# Patient Record
Sex: FEMALE | Race: BLACK OR AFRICAN AMERICAN | Employment: OTHER | URBAN - METROPOLITAN AREA
[De-identification: names, ages, dates, MRNs, and addresses within clinical notes are randomized per-mention and may not be internally consistent; named-entity substitution may affect disease eponyms.]

---

## 2018-09-06 ENCOUNTER — CLINICAL SUPPORT (OUTPATIENT)
Dept: FAMILY MEDICINE CLINIC | Age: 73
End: 2018-09-06

## 2018-09-06 DIAGNOSIS — E66.9 OBESITY, UNSPECIFIED CLASSIFICATION, UNSPECIFIED OBESITY TYPE, UNSPECIFIED WHETHER SERIOUS COMORBIDITY PRESENT: Primary | ICD-10-CM

## 2018-09-06 NOTE — PROGRESS NOTES
Patient attended a Medically Supervised Weight Loss New Patient Orientation today where we discussed:  - New Direction Very Low Calorie Diet details  - Medical Supervision  - Nutrition education  - Cost of Meal Replacements  - Policies and compliance required for program enrollment.      Patients initial consultation with physician is tentatively scheduled for:  Future Appointments  Date Time Provider Shubham Wayne   10/12/2018 9:00 AM Nancy Bowden NP [address] [facility]

## 2018-09-10 DIAGNOSIS — Z01.812 BLOOD TESTS PRIOR TO TREATMENT OR PROCEDURE: Primary | ICD-10-CM

## 2018-09-26 ENCOUNTER — HOSPITAL ENCOUNTER (OUTPATIENT)
Dept: LAB | Age: 73
Discharge: HOME | End: 2018-09-26

## 2018-09-26 LAB — XX-LABCORP SPECIMEN COL,LCBCF: NORMAL

## 2018-09-26 PROCEDURE — 99001 SPECIMEN HANDLING PT-LAB: CPT | Performed by: NURSE PRACTITIONER

## 2018-09-27 LAB
ALBUMIN SERPL-MCNC: 4.2 G/DL (ref 3.5–4.8)
ALBUMIN/GLOB SERPL: 1.4 {RATIO} (ref 1.2–2.2)
ALP SERPL-CCNC: 73 IU/L (ref 39–117)
ALT SERPL-CCNC: 17 IU/L (ref 0–32)
APPEARANCE UR: CLEAR
AST SERPL-CCNC: 21 IU/L (ref 0–40)
BACTERIA #/AREA URNS HPF: ABNORMAL /[HPF]
BASOPHILS # BLD AUTO: 0 X10E3/UL (ref 0–0.2)
BASOPHILS NFR BLD AUTO: 1 %
BILIRUB SERPL-MCNC: 0.3 MG/DL (ref 0–1.2)
BILIRUB UR QL STRIP: NEGATIVE
BUN SERPL-MCNC: 23 MG/DL (ref 8–27)
BUN/CREAT SERPL: 22 (ref 12–28)
CALCIUM SERPL-MCNC: 9.8 MG/DL (ref 8.7–10.3)
CASTS URNS QL MICRO: ABNORMAL /LPF
CHLORIDE SERPL-SCNC: 101 MMOL/L (ref 96–106)
CHOLEST SERPL-MCNC: 176 MG/DL (ref 100–199)
CO2 SERPL-SCNC: 26 MMOL/L (ref 20–29)
COLOR UR: YELLOW
CREAT SERPL-MCNC: 1.06 MG/DL (ref 0.57–1)
EOSINOPHIL # BLD AUTO: 0.1 X10E3/UL (ref 0–0.4)
EOSINOPHIL NFR BLD AUTO: 2 %
EPI CELLS #/AREA URNS HPF: ABNORMAL /HPF
ERYTHROCYTE [DISTWIDTH] IN BLOOD BY AUTOMATED COUNT: 14.7 % (ref 12.3–15.4)
GLOBULIN SER CALC-MCNC: 3.1 G/DL (ref 1.5–4.5)
GLUCOSE SERPL-MCNC: 97 MG/DL (ref 65–99)
GLUCOSE UR QL: NEGATIVE
HCT VFR BLD AUTO: 38.3 % (ref 34–46.6)
HDLC SERPL-MCNC: 61 MG/DL
HGB BLD-MCNC: 12 G/DL (ref 11.1–15.9)
HGB UR QL STRIP: NEGATIVE
IMM GRANULOCYTES # BLD: 0 X10E3/UL (ref 0–0.1)
IMM GRANULOCYTES NFR BLD: 0 %
KETONES UR QL STRIP: NEGATIVE
LDLC SERPL CALC-MCNC: 98 MG/DL (ref 0–99)
LEUKOCYTE ESTERASE UR QL STRIP: ABNORMAL
LYMPHOCYTES # BLD AUTO: 3.8 X10E3/UL (ref 0.7–3.1)
LYMPHOCYTES NFR BLD AUTO: 51 %
MAGNESIUM SERPL-MCNC: 1.9 MG/DL (ref 1.6–2.3)
MCH RBC QN AUTO: 26.3 PG (ref 26.6–33)
MCHC RBC AUTO-ENTMCNC: 31.3 G/DL (ref 31.5–35.7)
MCV RBC AUTO: 84 FL (ref 79–97)
MICRO URNS: ABNORMAL
MONOCYTES # BLD AUTO: 0.4 X10E3/UL (ref 0.1–0.9)
MONOCYTES NFR BLD AUTO: 6 %
MUCOUS THREADS URNS QL MICRO: PRESENT
NEUTROPHILS # BLD AUTO: 2.9 X10E3/UL (ref 1.4–7)
NEUTROPHILS NFR BLD AUTO: 40 %
NITRITE UR QL STRIP: NEGATIVE
PH UR STRIP: 6 [PH] (ref 5–7.5)
PLATELET # BLD AUTO: 257 X10E3/UL (ref 150–379)
POTASSIUM SERPL-SCNC: 4.3 MMOL/L (ref 3.5–5.2)
PROT SERPL-MCNC: 7.3 G/DL (ref 6–8.5)
PROT UR QL STRIP: NEGATIVE
RBC # BLD AUTO: 4.57 X10E6/UL (ref 3.77–5.28)
RBC #/AREA URNS HPF: ABNORMAL /HPF
SODIUM SERPL-SCNC: 140 MMOL/L (ref 134–144)
SP GR UR: 1.01 (ref 1–1.03)
TRIGL SERPL-MCNC: 85 MG/DL (ref 0–149)
TSH SERPL DL<=0.005 MIU/L-ACNC: 1.18 UIU/ML (ref 0.45–4.5)
URATE SERPL-MCNC: 7.2 MG/DL (ref 2.5–7.1)
UROBILINOGEN UR STRIP-MCNC: 0.2 MG/DL (ref 0.2–1)
VLDLC SERPL CALC-MCNC: 17 MG/DL (ref 5–40)
WBC # BLD AUTO: 7.4 X10E3/UL (ref 3.4–10.8)
WBC #/AREA URNS HPF: ABNORMAL /HPF

## 2018-09-28 NOTE — PROGRESS NOTES
Ordered labs/urinalysis/EKG for pt prior to consult for starting a medically supervised weight loss program. Would recommend follow up with PCP for clearance prior to start of VLCD at this time. Notified pt and daughter, they requested please send copy of labs to PCP for review.

## 2018-10-04 ENCOUNTER — TELEPHONE (OUTPATIENT)
Dept: SURGERY | Age: 73
End: 2018-10-04

## 2018-10-04 ENCOUNTER — DOCUMENTATION ONLY (OUTPATIENT)
Dept: SURGERY | Age: 73
End: 2018-10-04

## 2018-10-04 NOTE — TELEPHONE ENCOUNTER
Returning pt call as requested per nurse message no answer and VM full. Will attempt to return call again at later time.

## 2018-10-09 ENCOUNTER — TELEPHONE (OUTPATIENT)
Dept: SURGERY | Age: 73
End: 2018-10-09

## 2018-11-14 ENCOUNTER — OFFICE VISIT (OUTPATIENT)
Dept: SURGERY | Age: 73
End: 2018-11-14

## 2018-11-14 VITALS
HEART RATE: 67 BPM | RESPIRATION RATE: 20 BRPM | DIASTOLIC BLOOD PRESSURE: 84 MMHG | TEMPERATURE: 96.4 F | OXYGEN SATURATION: 98 % | HEIGHT: 57 IN | SYSTOLIC BLOOD PRESSURE: 152 MMHG | BODY MASS INDEX: 49.36 KG/M2 | WEIGHT: 228.8 LBS

## 2018-11-14 DIAGNOSIS — E11.9 TYPE 2 DIABETES MELLITUS WITHOUT COMPLICATION, WITHOUT LONG-TERM CURRENT USE OF INSULIN (HCC): ICD-10-CM

## 2018-11-14 DIAGNOSIS — Z72.4 INAPPROPRIATE DIET AND EATING HABITS: ICD-10-CM

## 2018-11-14 DIAGNOSIS — E66.01 MORBID (SEVERE) OBESITY DUE TO EXCESS CALORIES (HCC): Primary | ICD-10-CM

## 2018-11-14 RX ORDER — MELATONIN
1000
COMMUNITY

## 2018-11-14 RX ORDER — LOSARTAN POTASSIUM AND HYDROCHLOROTHIAZIDE 12.5; 5 MG/1; MG/1
TABLET ORAL
COMMUNITY
Start: 2018-08-17 | End: 2019-01-25

## 2018-11-14 NOTE — PATIENT INSTRUCTIONS
If you have any questions or concerns about today's appointment, the verbal and/or written instructions you were given for follow up care, please call our office at [phone]. [facility] - Chuckie Gagnon [address] [place] [address]    [phone] office  [phone]    Monthly goal:      4 meal replacements daily, no other food. First one within about 1 hour of waking up to get metabolism started. Don't go more than 6 hours between meals. No more than 1 soup a day, this is too much salt. No more than 1 bar a day, this not enough protein. You are allotted 10 carbs extra a day. Recommendations       - Consume your 4 daily meal replacements equally spaced over the day. Dont go more than 6 hours between each meal. Breakfast is especially important!      - Get the support of family and friends. - Snack-proof your home. - Have strategies for social situations, meetings that run over or vacation.       - If you fall off the plan; just start right back. Reflect on those days into examples of what to change or avoid next time. Program Compliance      We do not expect perfection. However, we do ask for your persistence and your willingness to do the work of growing yourself. You will hit plateaus in your weight loss. You will run into situations that test your will power. You will encounter times when you feel frustrated. How your respond to your slip ups and, the adjustments you make to prevent future slip ups will determine you long-term success. If you find yourself temporarily slipping in the program we will gently nudge you forward and encourage you to do the work of identifying and move beyond your stuck areas. However, if you find yourself wavering in the program for a prolonged time (more than 3 months) we will ask that you take a break from the program. At that time you will have 3 options:       1.  Consult with one of our weight loss specialists to explore additional weight loss options. 2. Work with a counselor to do some focused work in order to identify and break the patterns that are holding you back. 3. A combination of both these. Once you, your counselor and/or your weight loss specialist feel that you have moved past those patterns and want to give the program another chance, we will gladly work with you to determine if you're ready to start back in the program.      When returning after a break, if it has been over 3 months you will required to repeat an orientation and, if greater than 6 months, you will be required to repeat an orientation, labs and an EKG. Homework for [employer]        Exercise:   - Daily starting slow, gradually increase your time by 10- 20 % per week     - To prevent injury, take a recovery week every 4 weeks (reduce your exercise time and intensity by 1/2 during this time)     - Your goal is to work up to 150 min a week; hard enough that you can't whistle or sing. It may take 6 months to work up to this. - Call the Health  at [facility] labs for exercise ideas ([phone])          Diet:                            Common Side Effects     -Constipation  -Fatigue  -Hunger  -Low sodium  -Hair Loss        1. Constipation: It can be prevented by Drinking at least 8-10 glasses of water a day, fiber, and exercise. If you're prone to constipation:  - Take a Stool Softener daily. Example: Dulcolax or Miralax   - Eat Plenty of Fiber                        Get the New Direction products with fiber added                        Keep your fiber intake to at least 20 grams a day                        Use SUGAR-FREE products: Fiber One, Benefiber or Metamucil     If you get constipated:  1.  Start with a Stool Softener (produces a bowel movement in 72 hr)              2.  If you still have constipation after 2 to 3 days, add a Stimulant Laxative to the Stool Softener (results usually in 6-12 hr):  Examples:  Exlax (1 small square) for up to 4 days, Dulcolax stimulant laxative, or Magnesium citrate pill 500 mg start once a day and increase to 3 times a day if needed. 3. If conditions or symptoms persist contact your provider. 2.  Fatigue, most people experience this:  More common during the first 2 weeks, associated with your body adjusting to the Ketogenic diet. If symptoms persist contact your provider. 3.  Hunger:  More common in the first few days often disappears after body adjusts to the Ketogenic diet. 4.  Low blood levels of sodium, less common:   If you develop a headache, feel fatigued, light-headed or dizziness try adding 1/2 cup of bouillon broth every day. If symptoms persist contact your provider. 5.  Hair loss, you may see more than a usual amount of hair in your brush or the shower drain:   This can happen with physical stress (surgery, trauma or calorie restriction) or psychological stress. Although is it very concerning, it is often temporary and will resolve within 3 months. Some people notice a benefit from taking a daily dose of Biotin 2,500 mcg and increasing their Fish Oil intake. If symptoms persist contact your provider. For further information on where carbs hide in our foods:  1. Our Registered Dietitians     2. Www. Atkins. com/tools     3. Read food labels     4. Books       - The Calorie Cathye Krystyna       - The [place] System Diet for a New You       - Floresita Green's NEW Carb and Calorie 4th Edition (my favorite)     5.  Smart phone and Internet-based apps       - NPM        - Carb Manager     Helpful Information on behavior change:   Change Anything by Blondell Loss, Jorge Ross, and Alok Mancia     Mindless Eating by Dakota Juarez     Perfectly Yourself by Thu Lack     Me, Myself and I - 28 Days to Self-Love by Galen Delgado       Long-term Healthy Weight / Body Fat  Different ways to determining your ideal weight:  1. Keep your waist to less than 1/2 of your height   2. Keep your % body fat to under 30% for female and under 20% if male  3.  Keep your BMI around 25      Fun Facts About Carbs     - The Typical American Diet = 450 grams a day     - The Reducing Phase of the VLCD = 40 grams a day     - Target for weight loss maintenance:                        - Eat no more than 30 grams per meal                        - Eat no more than 10 grams per snack (mid-morning and mid-afternoon snack)

## 2018-11-14 NOTE — PROGRESS NOTES
Initial Consultation for Weight Loss    Siri Jorge is a 71 y.o. female who comes into the office today for initial consultation for the options for the treatment of obesity. The patient initially identified obesity at the age of \"always bigger\", worse after 30's, worse again after she stopped working and at age 26 weighed 130lbs. She has tried a variety of unsupervised weight-loss attempts including self imposed and physical activity , but has yet to meet with lasting success. Maximum weight lost on a diet is about 13 lbs, but that the weight loss always seems to return. Today, the patient is  Height: 4' 9\" (144.8 cm) tall, Weight: 103.8 kg (228 lb 12.8 oz) lbs for a Body mass index is 49.51 kg/m². It is due to the patient's obesity, which is further complicated by diabetes, hypertension and FABIO  that the patient is now seeking out medically supervised VLCD. Would like to lose wt for joint replacement. Goal: 190lbs          Ideal body weight: 45.5 kg (100 lb 5.7 oz)  Adjusted ideal body weight: 68.8 kg (151 lb 11.7 oz) (Based on [employer] Tables)      Wt Readings from Last 10 Encounters:   11/14/18 103.8 kg (228 lb 12.8 oz)   06/20/17 101.6 kg (224 lb)   01/20/17 102.1 kg (225 lb)   01/20/17 102.2 kg (225 lb 3.2 oz)   08/22/16 100.5 kg (221 lb 8 oz)       Weight Metrics 11/14/2018 6/20/2017 1/20/2017 1/20/2017 8/22/2016   Weight 228 lb 12.8 oz 224 lb 225 lb 225 lb 3.2 oz 221 lb 8 oz   BMI 49.51 kg/m2 48.47 kg/m2 48.69 kg/m2 48.73 kg/m2 47.93 kg/m2     History of binge eating: no    History of purging: no    Major lifestyle changes: no   Other commitments: no   Any potential unsupportive: yes     Has Walker Spears ever been told by a physician not to exercise: no    Does Walker Spears know of any reason they shouldn't exercise: yes  If yes, why? Needs double knee replacement      Does Maris have any food allergies or sensitivities: no      MWL questionnaire reviewed.        If female:  No LMP recorded. Patient has had a hysterectomy. Past Medical History:   Diagnosis Date    Arthritis     Diabetes ([facility] [address] 08/2016    Hypertension     Morbid obesity ([facility]     Sleep apnea     CPAP machine       Past Surgical History:   Procedure Laterality Date    HX HYSTERECTOMY      HX ORTHOPAEDIC Right     shoulder surgery    HX OTHER SURGICAL      Nephrectomy       Current Outpatient Medications   Medication Sig Dispense Refill    losartan-hydroCHLOROthiazide (HYZAAR) 50-12.5 mg per tablet TAKE 1 TABLET BY MOUTH ONCE DAILY      calcium carbonate (CALCIUM 500 PO) Take 500 mg by mouth.  acetaminophen (TYLENOL) 325 mg tablet Take  by mouth every four (4) hours as needed for Pain. Indications: BACK PAIN, PAIN      multivitamin (ONE A DAY) tablet Take 1 Tab by mouth daily.  calcium-cholecalciferol, d3, (CALCIUM 600 + D) 600-125 mg-unit tab Take  by mouth daily.  losartan (COZAAR) 25 mg tablet Take 25 mg by mouth daily.  diclofenac (VOLTAREN) 1 % gel Apply 2 g to affected area as needed.  Indications: OSTEOARTHRITIS         No Known Allergies    Social History     Tobacco Use    Smoking status: Never Smoker    Smokeless tobacco: Never Used   Substance Use Topics    Alcohol use: No    Drug use: No       Family History   Problem Relation Age of Onset    Diabetes Mother     Diabetes Sister        Family Status   Relation Name Status    Mother  (Not Specified)    Sister  (Not Specified)       Review of Systems:   Positive in BOLD  CONST: Fever, weight loss, fatigue or chills  GI: Nausea, vomiting, abdominal pain, change in bowel habits, hematochezia, melena, and GERD   INTEG: Dermatitis, abnormal moles  HEENT: Recent changes in vision, vertigo, epistaxis, dysphagia and hoarseness  CV: Chest pain, palpitations, HTN, edema and varicosities  RESP: Cough, shortness of breath, wheezing, hemoptysis, snoring and reactive airway disease  : Hematuria, dysuria, frequency, urgency, nocturia and stress urinary incontinence   MS: Weakness, joint pain- knee  and arthritis  ENDO: Diabetes- diet mary checks BS, thyroid disease, polyuria, polydipsia, polyphagia, poor wound healing, heat intolerance, cold intolerance  LYMPH/HEME: Anemia, bruising and history of blood transfusions  NEURO: Dizziness, headache, fainting, seizures and stroke  PSYCH: Anxiety and depression      Physical Exam    Visit Vitals  Resp 20   Ht 4' 9\" (1.448 m)   Wt 103.8 kg (228 lb 12.8 oz)   BMI 49.51 kg/m²           Physical Exam   Constitutional: She is oriented to person, place, and time and well-developed, well-nourished, and in no distress. HENT:   Head: Normocephalic. Cardiovascular: Normal rate and regular rhythm. Pulmonary/Chest: Effort normal and breath sounds normal.   Abdominal: Soft. She exhibits no distension. There is no tenderness. Musculoskeletal: Normal range of motion. Neurological: She is alert and oriented to person, place, and time. Skin: Skin is warm and dry. Psychiatric: Affect normal.   Nursing note and vitals reviewed. Lab results reviewed. For significant abnormal values and values requiring intervention, see assessment and plan. Assessment/Plan  Elena Gay is a 71 y.o. female who is suffering from obesity with a BMI of 49.51  and comorbidities including diabetes and hypertension  who would benefit from weight loss. Diet regimen   # of meal replacements prescribed: 4 MR   If modified LCD-nutritional guidelines:    Monthly Goal   10lbs    Medical monitoring schedule:   Weekly BP/Weight checks   Monthly provider appointments              Monthly CMP, uric acid checks      I have reviewed/discussed the above normal BMI with the patient and daughter. I have recommended the following interventions: dietary management education, guidance, and counseling, monitor weight and prescribed dietary intake . .      Ms. Walls has a reminder for a \"due or due soon\" health maintenance.  I have asked that she contact her primary care provider for follow-up on this health maintenance.     Lilian Andrade, FNP-BC

## 2018-11-28 ENCOUNTER — CLINICAL SUPPORT (OUTPATIENT)
Dept: FAMILY MEDICINE CLINIC | Age: 73
End: 2018-11-28

## 2018-11-28 VITALS
HEART RATE: 82 BPM | SYSTOLIC BLOOD PRESSURE: 137 MMHG | BODY MASS INDEX: 47.68 KG/M2 | DIASTOLIC BLOOD PRESSURE: 82 MMHG | HEIGHT: 57 IN | WEIGHT: 221 LBS

## 2018-11-28 DIAGNOSIS — E66.9 OBESITY, UNSPECIFIED CLASSIFICATION, UNSPECIFIED OBESITY TYPE, UNSPECIFIED WHETHER SERIOUS COMORBIDITY PRESENT: Primary | ICD-10-CM

## 2018-11-28 NOTE — PROGRESS NOTES
Progress Note: Weekly Medical Monitoring in the [facility] Weight Loss Program    Is there anything that you or the patient needs to let the supervising provider know about? no    Over the past week, have you experienced any side-effects? Yes constipation and dry mouth    Jose David Higginbtoham is a 71 y.o. female who is enrolled in [facility]    Jose David Higginbotham was prescribed the VLCD / LCD. Visit Vitals  /82   Pulse 82   Ht 4' 9\" (1.448 m)   Wt 100.2 kg (221 lb)   BMI 47.82 kg/m²     Weight Metrics 11/28/2018 11/14/2018 11/14/2018 6/20/2017 1/20/2017 1/20/2017 8/22/2016   Weight 221 lb - 228 lb 12.8 oz 224 lb 225 lb 225 lb 3.2 oz 221 lb 8 oz   Waist Measure Inches 41 44.5 - - - - -   BMI 47.82 kg/m2 - 49.51 kg/m2 48.47 kg/m2 48.69 kg/m2 48.73 kg/m2 47.93 kg/m2         Have you received any other medical care this week? no  If yes, where and for what? Have you had any change in your medications since your last visit? no  If yes what? Did you have any problems adhering to the program last week? no  If yes, please explain:       Eating Habits Over Last Week:  Did you take in 64 oz of non-caloric fluids? no     Did you consume your prescribed meal replacement regimen each day?  yes       Physical Activity Over the Past Week:    Aerobic exercise: 0 min  Resistance exercise: 3 workouts / week

## 2018-12-07 ENCOUNTER — CLINICAL SUPPORT (OUTPATIENT)
Dept: FAMILY MEDICINE CLINIC | Age: 73
End: 2018-12-07

## 2018-12-07 VITALS
WEIGHT: 218 LBS | HEIGHT: 57 IN | SYSTOLIC BLOOD PRESSURE: 116 MMHG | BODY MASS INDEX: 47.03 KG/M2 | HEART RATE: 82 BPM | DIASTOLIC BLOOD PRESSURE: 76 MMHG

## 2018-12-07 DIAGNOSIS — E66.9 OBESITY, UNSPECIFIED CLASSIFICATION, UNSPECIFIED OBESITY TYPE, UNSPECIFIED WHETHER SERIOUS COMORBIDITY PRESENT: Primary | ICD-10-CM

## 2018-12-07 NOTE — PROGRESS NOTES
Chief Complaint Patient presents with  Weight Management Progress Note: Weekly Medical Monitoring in the [facility] Weight Loss Program   
Is there anything that you or the patient needs to let the supervising provider know about? no 
 
Over the past week, have you experienced any side-effects? no 
 
Jaja Ramsey is a 71 y.o. female who is enrolled in [facility] Program 
 
Jaja Ramsey was prescribed the VLCD / LCD. Visit Vitals /76 Pulse 82 Ht 4' 9\" (1.448 m) Wt 218 lb (98.9 kg) BMI 47.17 kg/m² Weight Metrics 12/7/2018 11/28/2018 11/14/2018 11/14/2018 6/20/2017 1/20/2017 1/20/2017 Weight 218 lb 221 lb - 228 lb 12.8 oz 224 lb 225 lb 225 lb 3.2 oz Waist Measure Inches 43 41 44.5 - - - -  
BMI 47.17 kg/m2 47.82 kg/m2 - 49.51 kg/m2 48.47 kg/m2 48.69 kg/m2 48.73 kg/m2 Have you received any other medical care this week? no  If yes, where and for what? Have you had any change in your medications since your last visit? no  If yes what? Did you have any problems adhering to the program last week? no  If yes, please explain:  
 
 
Eating Habits Over Last Week: 
Did you take in 64 oz of non-caloric fluids? no  
 
Did you consume your prescribed meal replacement regimen each day? yes Physical Activity Over the Past Week: 
 
Aerobic exercise: 90 min Resistance exercise: no workouts / week

## 2018-12-12 ENCOUNTER — CLINICAL SUPPORT (OUTPATIENT)
Dept: FAMILY MEDICINE CLINIC | Age: 73
End: 2018-12-12

## 2018-12-12 DIAGNOSIS — E66.9 OBESITY, UNSPECIFIED CLASSIFICATION, UNSPECIFIED OBESITY TYPE, UNSPECIFIED WHETHER SERIOUS COMORBIDITY PRESENT: Primary | ICD-10-CM

## 2018-12-14 VITALS
DIASTOLIC BLOOD PRESSURE: 74 MMHG | WEIGHT: 215.2 LBS | SYSTOLIC BLOOD PRESSURE: 122 MMHG | HEART RATE: 92 BPM | BODY MASS INDEX: 46.57 KG/M2

## 2018-12-14 NOTE — PROGRESS NOTES
Progress Note: Weekly Medical Monitoring in the [facility] Weight Loss Program    Is there anything that you or the patient needs to let the supervising provider know about? no    Over the past week, have you experienced any side-effects? no    Becky Cuevas is a 71 y.o. female who is enrolled in [facility]    Becky Cuevas was prescribed the VLCD / LCD. Visit Vitals  /74   Pulse 92   Wt 97.6 kg (215 lb 3.2 oz)   BMI 46.57 kg/m²     Weight Metrics 12/14/2018 12/12/2018 12/7/2018 11/28/2018 11/14/2018 11/14/2018 6/20/2017   Weight - 215 lb 3.2 oz 218 lb 221 lb - 228 lb 12.8 oz 224 lb   Waist Measure Inches 43.5 - 43 41 44.5 - -   BMI - 46.57 kg/m2 47.17 kg/m2 47.82 kg/m2 - 49.51 kg/m2 48.47 kg/m2         Have you received any other medical care this week? no  If yes, where and for what? Have you had any change in your medications since your last visit? no  If yes what? Did you have any problems adhering to the program last week? no  If yes, please explain:       Eating Habits Over Last Week:  Did you take in 64 oz of non-caloric fluids? no     Did you consume your prescribed meal replacement regimen each day?  yes       Physical Activity Over the Past Week:    Aerobic exercise: 0 min  Resistance exercise: 0 workouts / week

## 2018-12-27 ENCOUNTER — HOSPITAL ENCOUNTER (OUTPATIENT)
Dept: LAB | Age: 73
Discharge: HOME | End: 2018-12-27
Payer: MEDICARE

## 2018-12-27 DIAGNOSIS — E11.9 TYPE 2 DIABETES MELLITUS WITHOUT COMPLICATION, WITHOUT LONG-TERM CURRENT USE OF INSULIN (HCC): ICD-10-CM

## 2018-12-27 DIAGNOSIS — E66.01 MORBID (SEVERE) OBESITY DUE TO EXCESS CALORIES (HCC): ICD-10-CM

## 2018-12-27 DIAGNOSIS — Z72.4 INAPPROPRIATE DIET AND EATING HABITS: ICD-10-CM

## 2018-12-27 LAB
ALBUMIN SERPL-MCNC: 3.6 G/DL (ref 3.4–5)
ALBUMIN/GLOB SERPL: 0.8 {RATIO} (ref 0.8–1.7)
ALP SERPL-CCNC: 76 U/L (ref 45–117)
ALT SERPL-CCNC: 29 U/L (ref 13–56)
ANION GAP SERPL CALC-SCNC: 5 MMOL/L (ref 3–18)
AST SERPL-CCNC: 23 U/L (ref 15–37)
BILIRUB SERPL-MCNC: 0.4 MG/DL (ref 0.2–1)
BUN SERPL-MCNC: 38 MG/DL (ref 7–18)
BUN/CREAT SERPL: 37 (ref 12–20)
CALCIUM SERPL-MCNC: 9.7 MG/DL (ref 8.5–10.1)
CHLORIDE SERPL-SCNC: 106 MMOL/L (ref 100–108)
CO2 SERPL-SCNC: 31 MMOL/L (ref 21–32)
CREAT SERPL-MCNC: 1.04 MG/DL (ref 0.6–1.3)
GLOBULIN SER CALC-MCNC: 4.3 G/DL (ref 2–4)
GLUCOSE SERPL-MCNC: 91 MG/DL (ref 74–99)
POTASSIUM SERPL-SCNC: 4.5 MMOL/L (ref 3.5–5.5)
PROT SERPL-MCNC: 7.9 G/DL (ref 6.4–8.2)
SODIUM SERPL-SCNC: 142 MMOL/L (ref 136–145)
URATE SERPL-MCNC: 5.6 MG/DL (ref 2.6–7.2)

## 2018-12-27 PROCEDURE — 84550 ASSAY OF BLOOD/URIC ACID: CPT

## 2018-12-27 PROCEDURE — 36415 COLL VENOUS BLD VENIPUNCTURE: CPT

## 2018-12-27 PROCEDURE — 80053 COMPREHEN METABOLIC PANEL: CPT

## 2018-12-28 ENCOUNTER — OFFICE VISIT (OUTPATIENT)
Dept: SURGERY | Age: 73
End: 2018-12-28

## 2018-12-28 VITALS
BODY MASS INDEX: 47.47 KG/M2 | WEIGHT: 211 LBS | HEIGHT: 56 IN | RESPIRATION RATE: 20 BRPM | TEMPERATURE: 98.3 F | DIASTOLIC BLOOD PRESSURE: 70 MMHG | SYSTOLIC BLOOD PRESSURE: 116 MMHG | HEART RATE: 68 BPM

## 2018-12-28 DIAGNOSIS — Z71.3 WEIGHT LOSS COUNSELING, ENCOUNTER FOR: ICD-10-CM

## 2018-12-28 DIAGNOSIS — R63.4 RAPID WEIGHT LOSS: ICD-10-CM

## 2018-12-28 DIAGNOSIS — E66.01 OBESITY, MORBID (HCC): Primary | ICD-10-CM

## 2018-12-28 NOTE — PROGRESS NOTES
Chief Complaint   Patient presents with    Weight Management   1. Have you been to the ER, urgent care clinic since your last visit? Hospitalized since your last visit? No    2. Have you seen or consulted any other health care providers outside of the [address] [address] since your last visit? Include any pap smears or colon screening. No                          Nursing Note for Medical Monitoring in the [facility] Weight Loss Program      Haroldo Thomas is a 68 y.o. female who is enrolled in [facility]    Haroldo Thomas was prescribed the VLCD / LCD. Did you have any problems adhering to the program last week? no  If yes, please explain:     Since your last visit, have you experienced any complications? no    Have you received any other medical care this week? no  If yes, where and for what? Have you had any change in your medications since your last visit? no  If yes what? Are you taking an appetite suppressant? no   If yes:  Do you need a refill? BP Readings from Last 3 Encounters:   12/28/18 116/70   12/14/18 122/74   12/07/18 116/76        Eating Habits Over Last Week:  Did you take in 64 oz of non-caloric fluids? no     Did you consume your prescribed meal replacement regimen each day?  yes       Physical Activity Over the Past Week:    Aerobic exercise: 0 min  Resistance exercise: 2 workouts / week stretching

## 2018-12-28 NOTE — PROGRESS NOTES
[facility] Weight Loss Program Progress Note:   F/up Provider Visit    CC: Weight Management    Tete Ríos is a 68 y.o. female who is here for her  f/up medical provider visit for the VLCD Program. she did attend class last week.     -17 since starting program     Weight History  Weight Metrics 2018   Weight - 211 lb - 215 lb 3.2 oz 218 lb 221 lb -   Waist Measure Inches 42.5 - 43.5 - 43 41 44.5   BMI - 47.31 kg/m2 - 46.57 kg/m2 47.17 kg/m2 47.82 kg/m2 -       Starting wt: 228 lbs  Goal wt: 190lbs     Ideal body weight: 43.9 kg (96 lb 13.9 oz)  Adjusted ideal body weight: 64.6 kg (142 lb 8.3 oz)  Body mass index is 47.31 kg/m². History    Past Medical History:   Diagnosis Date    Arthritis     Diabetes ([facility] [address] [facility] 2016    no meds    Hypertension     Morbid obesity ([facility] [address] [facility]     Sleep apnea     CPAP machine    Use of cane as ambulatory aid        Past Surgical History:   Procedure Laterality Date    HX COLONOSCOPY      HX GYN          HX HEENT      cataract right and left    HX HYSTERECTOMY      HX ORTHOPAEDIC Right     shoulder surgery    HX OTHER SURGICAL      Nephrectomy       Current Outpatient Medications   Medication Sig Dispense Refill    cholecalciferol (VITAMIN D3) 1,000 unit tablet 1,000 Units.  acetaminophen (TYLENOL) 325 mg tablet Take  by mouth every four (4) hours as needed for Pain. Indications: BACK PAIN, PAIN      calcium-cholecalciferol, d3, (CALCIUM 600 + D) 600-125 mg-unit tab Take  by mouth daily.  losartan (COZAAR) 25 mg tablet Take 25 mg by mouth daily.  diclofenac (VOLTAREN) 1 % gel Apply 2 g to affected area as needed. Indications: OSTEOARTHRITIS      losartan-hydroCHLOROthiazide (HYZAAR) 50-12.5 mg per tablet TAKE 1 TABLET BY MOUTH ONCE DAILY      calcium carbonate (CALCIUM 500 PO) Take 500 mg by mouth.  multivitamin (ONE A DAY) tablet Take 1 Tab by mouth daily. No Known Allergies    Social History     Tobacco Use    Smoking status: Never Smoker    Smokeless tobacco: Never Used   Substance Use Topics    Alcohol use: No    Drug use: No       Family History   Problem Relation Age of Onset    Diabetes Mother     Diabetes Sister        Family Status   Relation Name Status    Mother  (Not Specified)    Sister  (Not Specified)         Medications:  Outpatient Medications Marked as Taking for the 12/28/18 encounter (Office Visit) with Azalea Salazar NP   Medication Sig Dispense Refill    cholecalciferol (VITAMIN D3) 1,000 unit tablet 1,000 Units.  acetaminophen (TYLENOL) 325 mg tablet Take  by mouth every four (4) hours as needed for Pain. Indications: BACK PAIN, PAIN      calcium-cholecalciferol, d3, (CALCIUM 600 + D) 600-125 mg-unit tab Take  by mouth daily.  losartan (COZAAR) 25 mg tablet Take 25 mg by mouth daily.  diclofenac (VOLTAREN) 1 % gel Apply 2 g to affected area as needed. Indications: OSTEOARTHRITIS           Review of Systems  Review of Systems   Constitutional: Positive for malaise/fatigue and weight loss. Musculoskeletal: Positive for back pain and joint pain. All other systems reviewed and are negative. Objective  Visit Vitals  /70   Pulse 68   Temp 98.3 °F (36.8 °C)   Resp 20   Ht 4' 8\" (1.422 m)   Wt 95.7 kg (211 lb)   BMI 47.31 kg/m²     No LMP recorded. Patient has had a hysterectomy. Physical Exam  Physical Exam   Constitutional: She is oriented to person, place, and time. She appears well-developed and well-nourished. HENT:   Head: Normocephalic. Cardiovascular: Normal rate and regular rhythm. Pulmonary/Chest: Effort normal and breath sounds normal.   Musculoskeletal: Normal range of motion. Neurological: She is alert and oriented to person, place, and time. Skin: Skin is warm and dry. Psychiatric: She has a normal mood and affect. Nursing note and vitals reviewed.         Assessment / Plan    1. Weight management    Degree of control: doing great! Progress was reviewed with patient. Goal(s) for next appointment:   -10lbs       2.   Labs    Latest results reviewed with patient   Routine monitoring labs ordered    Follow up 1 mo    >50% of 30 min visit spent counseling     YUKO Olmstead-BC

## 2019-01-09 ENCOUNTER — CLINICAL SUPPORT (OUTPATIENT)
Dept: FAMILY MEDICINE CLINIC | Age: 74
End: 2019-01-09

## 2019-01-09 DIAGNOSIS — E66.9 OBESITY, UNSPECIFIED CLASSIFICATION, UNSPECIFIED OBESITY TYPE, UNSPECIFIED WHETHER SERIOUS COMORBIDITY PRESENT: Primary | ICD-10-CM

## 2019-01-10 VITALS
SYSTOLIC BLOOD PRESSURE: 120 MMHG | WEIGHT: 208.8 LBS | DIASTOLIC BLOOD PRESSURE: 76 MMHG | BODY MASS INDEX: 46.97 KG/M2 | HEART RATE: 76 BPM | HEIGHT: 56 IN

## 2019-01-10 NOTE — PROGRESS NOTES
Progress Note: Weekly Medical Monitoring in the [facility]    Is there anything that you or the patient needs to let the supervising provider know about? no    Over the past week, have you experienced any side-effects? no    Renay Bosworth is a 68 y.o. female who is enrolled in [facility]    Renay Bosworth was prescribed the VLCD / LCD. Visit Vitals  /76   Pulse 76   Ht 4' 8\" (1.422 m)   Wt 94.7 kg (208 lb 12.8 oz)   BMI 46.81 kg/m²     Weight Metrics 1/10/2019 1/9/2019 12/28/2018 12/28/2018 12/14/2018 12/12/2018 12/7/2018   Weight - 208 lb 12.8 oz - 211 lb - 215 lb 3.2 oz 218 lb   Waist Measure Inches 40.5 - 42.5 - 43.5 - 43   BMI - 46.81 kg/m2 - 47.31 kg/m2 - 46.57 kg/m2 47.17 kg/m2         Have you received any other medical care this week? no  If yes, where and for what? Have you had any change in your medications since your last visit? no  If yes what? Did you have any problems adhering to the program last week? no  If yes, please explain:       Eating Habits Over Last Week:  Did you take in 64 oz of non-caloric fluids? no     Did you consume your prescribed meal replacement regimen each day?  yes       Physical Activity Over the Past Week:    Aerobic exercise: 0 min  Resistance exercise: 3 workouts / week

## 2019-01-16 ENCOUNTER — CLINICAL SUPPORT (OUTPATIENT)
Dept: FAMILY MEDICINE CLINIC | Age: 74
End: 2019-01-16

## 2019-01-16 DIAGNOSIS — E66.9 OBESITY, UNSPECIFIED CLASSIFICATION, UNSPECIFIED OBESITY TYPE, UNSPECIFIED WHETHER SERIOUS COMORBIDITY PRESENT: Primary | ICD-10-CM

## 2019-01-17 VITALS
HEART RATE: 86 BPM | DIASTOLIC BLOOD PRESSURE: 80 MMHG | WEIGHT: 206.8 LBS | HEIGHT: 56 IN | SYSTOLIC BLOOD PRESSURE: 120 MMHG | BODY MASS INDEX: 46.52 KG/M2

## 2019-01-17 NOTE — PROGRESS NOTES
Progress Note: Weekly Medical Monitoring in the [facility]    Is there anything that you or the patient needs to let the supervising provider know about? no    Over the past week, have you experienced any side-effects? no    Jeremias Gordon is a 68 y.o. female who is enrolled in [facility]    Jeremias Gordon was prescribed the VLCD / LCD. Visit Vitals  /80   Pulse 86   Ht 4' 8\" (1.422 m)   Wt 93.8 kg (206 lb 12.8 oz)   BMI 46.36 kg/m²     Weight Metrics 1/17/2019 1/16/2019 1/10/2019 1/9/2019 12/28/2018 12/28/2018 12/14/2018   Weight - 206 lb 12.8 oz - 208 lb 12.8 oz - 211 lb -   Waist Measure Inches 48 - 40.5 - 42.5 - 43.5   BMI - 46.36 kg/m2 - 46.81 kg/m2 - 47.31 kg/m2 -         Have you received any other medical care this week? no  If yes, where and for what? Have you had any change in your medications since your last visit? no  If yes what? Did you have any problems adhering to the program last week? no  If yes, please explain:       Eating Habits Over Last Week:  Did you take in 64 oz of non-caloric fluids?  no     Did you consume your prescribed meal replacement regimen each day? no       Physical Activity Over the Past Week:    Aerobic exercise: 0 min  Resistance exercise: 0 workouts / week

## 2019-01-24 ENCOUNTER — HOSPITAL ENCOUNTER (OUTPATIENT)
Dept: LAB | Age: 74
Discharge: HOME | End: 2019-01-24

## 2019-01-24 LAB — XX-LABCORP SPECIMEN COL,LCBCF: NORMAL

## 2019-01-24 PROCEDURE — 99001 SPECIMEN HANDLING PT-LAB: CPT

## 2019-01-25 ENCOUNTER — OFFICE VISIT (OUTPATIENT)
Dept: SURGERY | Age: 74
End: 2019-01-25

## 2019-01-25 VITALS
TEMPERATURE: 98.1 F | DIASTOLIC BLOOD PRESSURE: 75 MMHG | SYSTOLIC BLOOD PRESSURE: 113 MMHG | BODY MASS INDEX: 46.38 KG/M2 | HEIGHT: 56 IN | OXYGEN SATURATION: 98 % | WEIGHT: 206.2 LBS | HEART RATE: 65 BPM | RESPIRATION RATE: 22 BRPM

## 2019-01-25 DIAGNOSIS — E66.01 MORBID OBESITY (HCC): ICD-10-CM

## 2019-01-25 DIAGNOSIS — E66.01 OBESITY, MORBID (HCC): Primary | ICD-10-CM

## 2019-01-25 LAB
ALBUMIN SERPL-MCNC: 4 G/DL (ref 3.5–4.8)
ALBUMIN/GLOB SERPL: 1.2 {RATIO} (ref 1.2–2.2)
ALP SERPL-CCNC: 69 IU/L (ref 39–117)
ALT SERPL-CCNC: 18 IU/L (ref 0–32)
AST SERPL-CCNC: 23 IU/L (ref 0–40)
BILIRUB SERPL-MCNC: 0.4 MG/DL (ref 0–1.2)
BUN SERPL-MCNC: 30 MG/DL (ref 8–27)
BUN/CREAT SERPL: 31 (ref 12–28)
CALCIUM SERPL-MCNC: 10 MG/DL (ref 8.7–10.3)
CHLORIDE SERPL-SCNC: 99 MMOL/L (ref 96–106)
CO2 SERPL-SCNC: 26 MMOL/L (ref 20–29)
CREAT SERPL-MCNC: 0.97 MG/DL (ref 0.57–1)
GLOBULIN SER CALC-MCNC: 3.3 G/DL (ref 1.5–4.5)
GLUCOSE SERPL-MCNC: 104 MG/DL (ref 65–99)
POTASSIUM SERPL-SCNC: 4.3 MMOL/L (ref 3.5–5.2)
PROT SERPL-MCNC: 7.3 G/DL (ref 6–8.5)
SODIUM SERPL-SCNC: 142 MMOL/L (ref 134–144)
URATE SERPL-MCNC: 5.3 MG/DL (ref 2.5–7.1)

## 2019-01-25 NOTE — PROGRESS NOTES
[facility] Progress Note:   F/up Provider Visit    CC: Weight Management      Hi Murguia is a 68 y.o. female who is here for her  f/up medical provider visit for the VLCD/LCD Program. she DID attend class last week. Admits to not drinking enough water. Some days she drinks 16 oz and others up to 60 oz. Weight History  Weight Metrics 2019 2019 2019 2019 1/10/2019 2019 2018   Weight - 206 lb 3.2 oz - 206 lb 12.8 oz - 208 lb 12.8 oz -   Waist Measure Inches 41.5 - 48 - 40.5 - 42.5   BMI - 46.23 kg/m2 - 46.36 kg/m2 - 46.81 kg/m2 -       Starting wt: 228  Goal wt: 190   EKG due: no  Ideal body weight: 43.9 kg (96 lb 13.9 oz)  Adjusted ideal body weight: 63.8 kg (140 lb 9.6 oz)  Body mass index is 46.23 kg/m². History    Past Medical History:   Diagnosis Date    Arthritis     Diabetes ([facility] [address] 2016    no meds    Hypertension     Morbid obesity ([facility] [address]     Sleep apnea     CPAP machine    Use of cane as ambulatory aid        Past Surgical History:   Procedure Laterality Date    HX COLONOSCOPY      HX GYN          HX HEENT      cataract right and left    HX HYSTERECTOMY      HX ORTHOPAEDIC Right     shoulder surgery    HX OTHER SURGICAL      Nephrectomy       Current Outpatient Medications   Medication Sig Dispense Refill    losartan-hydroCHLOROthiazide (HYZAAR) 50-12.5 mg per tablet TAKE 1 TABLET BY MOUTH ONCE DAILY      calcium carbonate (CALCIUM 500 PO) Take 500 mg by mouth.  cholecalciferol (VITAMIN D3) 1,000 unit tablet 1,000 Units.  acetaminophen (TYLENOL) 325 mg tablet Take  by mouth every four (4) hours as needed for Pain. Indications: BACK PAIN, PAIN      multivitamin (ONE A DAY) tablet Take 1 Tab by mouth daily.  calcium-cholecalciferol, d3, (CALCIUM 600 + D) 600-125 mg-unit tab Take  by mouth daily.  losartan (COZAAR) 25 mg tablet Take 25 mg by mouth daily.       diclofenac (VOLTAREN) 1 % gel Apply 2 g to affected area as needed. Indications: OSTEOARTHRITIS         No Known Allergies    Social History     Tobacco Use    Smoking status: Never Smoker    Smokeless tobacco: Never Used   Substance Use Topics    Alcohol use: No    Drug use: No       Family History   Problem Relation Age of Onset    Diabetes Mother     Diabetes Sister        Family Status   Relation Name Status    Mother  (Not Specified)    Sister  (Not Specified)         Medications Currently Taking  Outpatient Medications Marked as Taking for the 1/25/19 encounter (Office Visit) with Erik Shipley NP   Medication Sig Dispense Refill    losartan-hydroCHLOROthiazide (HYZAAR) 50-12.5 mg per tablet TAKE 1 TABLET BY MOUTH ONCE DAILY      calcium carbonate (CALCIUM 500 PO) Take 500 mg by mouth.  cholecalciferol (VITAMIN D3) 1,000 unit tablet 1,000 Units.  acetaminophen (TYLENOL) 325 mg tablet Take  by mouth every four (4) hours as needed for Pain. Indications: BACK PAIN, PAIN      multivitamin (ONE A DAY) tablet Take 1 Tab by mouth daily.  calcium-cholecalciferol, d3, (CALCIUM 600 + D) 600-125 mg-unit tab Take  by mouth daily.  losartan (COZAAR) 25 mg tablet Take 25 mg by mouth daily.  diclofenac (VOLTAREN) 1 % gel Apply 2 g to affected area as needed. Indications: OSTEOARTHRITIS         Review of Systems  Review of Systems   Constitutional: Positive for malaise/fatigue. HENT: Negative. Eyes: Negative. Respiratory: Negative. Cardiovascular: Negative. Gastrointestinal: Negative. Musculoskeletal: Negative. Skin: Negative. Neurological: Negative. Psychiatric/Behavioral: Negative. Objective  Visit Vitals  /75   Pulse 65   Temp 98.1 °F (36.7 °C) (Oral)   Resp 22   Ht 4' 8\" (1.422 m)   Wt 93.5 kg (206 lb 3.2 oz)   SpO2 98%   BMI 46.23 kg/m²     No LMP recorded. Patient has had a hysterectomy.       Physical Exam  Physical Exam   Constitutional: She is oriented to person, place, and time. She appears well-developed and well-nourished. Cardiovascular: Normal rate, regular rhythm, normal heart sounds and intact distal pulses. Pulmonary/Chest: Effort normal and breath sounds normal.   Abdominal: Soft. Bowel sounds are normal.   Neurological: She is alert and oriented to person, place, and time. Skin: Skin is warm. Psychiatric: She has a normal mood and affect. Her behavior is normal. Judgment and thought content normal.       Assessment / Plan    No diagnosis found. 1.  Weight management    well controlled   Progress was reviewed with patient   Goal(s) for next appointment:   8-10 pound loss       2.   Labs    Latest results reviewed with patient   Routine monitoring labs ordered        Follow-up Disposition: 1 month      >50% of 25 min visit spent counseling       BENJAMIN Palacios

## 2019-01-25 NOTE — PROGRESS NOTES
Nursing Note for Medical Monitoring in the [facility]      Javi Boyer is a 68 y.o. female who is enrolled in [facility]    Javi Boyer was prescribed the VLCD / LCD. Did you have any problems adhering to the program last week? yes  If yes, please explain: off track a bit, but nibbled    Since your last visit, have you experienced any complications? Yes, fatigued and tired    Have you received any other medical care this week? yes  If yes, where and for what? Shots in knees by ortho, blood work done    Have you had any change in your medications since your last visit? no  If yes what? Are you taking an appetite suppressant? no   If yes:  Do you need a refill? BP Readings from Last 3 Encounters:   01/17/19 120/80   01/10/19 120/76   12/28/18 116/70        Eating Habits Over Last Week:  Did you take in 64 oz of non-caloric fluids? no    Did you consume your prescribed meal replacement regimen each day?  Yes, did 4 meals daily      Physical Activity Over the Past Week:    Aerobic exercise: 40 min  Resistance exercise: 3 workouts / week

## 2019-01-30 ENCOUNTER — CLINICAL SUPPORT (OUTPATIENT)
Dept: FAMILY MEDICINE CLINIC | Age: 74
End: 2019-01-30

## 2019-01-30 DIAGNOSIS — E66.9 OBESITY, UNSPECIFIED CLASSIFICATION, UNSPECIFIED OBESITY TYPE, UNSPECIFIED WHETHER SERIOUS COMORBIDITY PRESENT: Primary | ICD-10-CM

## 2019-02-01 VITALS
DIASTOLIC BLOOD PRESSURE: 74 MMHG | SYSTOLIC BLOOD PRESSURE: 114 MMHG | HEART RATE: 86 BPM | BODY MASS INDEX: 46.09 KG/M2 | WEIGHT: 205.6 LBS

## 2019-02-01 NOTE — PROGRESS NOTES
Progress Note: Weekly Medical Monitoring in the [facility]    Is there anything that you or the patient needs to let the supervising provider know about? no    Over the past week, have you experienced any side-effects? no    Gemini Cuevas is a 68 y.o. female who is enrolled in [facility]    Gemini Cuevas was prescribed the VLCD / LCD. Visit Vitals  /74   Pulse 86   Wt 93.3 kg (205 lb 9.6 oz)   BMI 46.09 kg/m²     Weight Metrics 2/1/2019 1/30/2019 1/25/2019 1/25/2019 1/17/2019 1/16/2019 1/10/2019   Weight - 205 lb 9.6 oz - 206 lb 3.2 oz - 206 lb 12.8 oz -   Waist Measure Inches 40.5 - 41.5 - 48 - 40.5   BMI - 46.09 kg/m2 - 46.23 kg/m2 - 46.36 kg/m2 -         Have you received any other medical care this week? no  If yes, where and for what? Have you had any change in your medications since your last visit? no  If yes what? Did you have any problems adhering to the program last week? no  If yes, please explain:       Eating Habits Over Last Week:  Did you take in 64 oz of non-caloric fluids?  no     Did you consume your prescribed meal replacement regimen each day? no       Physical Activity Over the Past Week:    Aerobic exercise: 0 min  Resistance exercise: 4 workouts / week

## 2019-02-06 ENCOUNTER — CLINICAL SUPPORT (OUTPATIENT)
Dept: FAMILY MEDICINE CLINIC | Age: 74
End: 2019-02-06

## 2019-02-06 DIAGNOSIS — E66.9 OBESITY, UNSPECIFIED CLASSIFICATION, UNSPECIFIED OBESITY TYPE, UNSPECIFIED WHETHER SERIOUS COMORBIDITY PRESENT: Primary | ICD-10-CM

## 2019-02-07 VITALS
SYSTOLIC BLOOD PRESSURE: 112 MMHG | BODY MASS INDEX: 46.14 KG/M2 | HEIGHT: 56 IN | WEIGHT: 205.1 LBS | HEART RATE: 68 BPM | DIASTOLIC BLOOD PRESSURE: 78 MMHG

## 2019-02-07 NOTE — PROGRESS NOTES
Progress Note: Weekly Medical Monitoring in the [facility]    Is there anything that you or the patient needs to let the supervising provider know about? no    Over the past week, have you experienced any side-effects? no    Deejay Domingo is a 68 y.o. female who is enrolled in [facility]    Deejay Domingo was prescribed the VLCD / LCD. Visit Vitals  /78   Pulse 68   Ht 4' 8\" (1.422 m)   Wt 93 kg (205 lb 1.6 oz)   BMI 45.98 kg/m²     Weight Metrics 2/7/2019 2/6/2019 2/1/2019 1/30/2019 1/25/2019 1/25/2019 1/17/2019   Weight - 205 lb 1.6 oz - 205 lb 9.6 oz - 206 lb 3.2 oz -   Waist Measure Inches 40.25 - 40.5 - 41.5 - 48   BMI - 45.98 kg/m2 - 46.09 kg/m2 - 46.23 kg/m2 -         Have you received any other medical care this week? no  If yes, where and for what? Have you had any change in your medications since your last visit? no  If yes what? Did you have any problems adhering to the program last week? no  If yes, please explain:       Eating Habits Over Last Week:  Did you take in 64 oz of non-caloric fluids?  no     Did you consume your prescribed meal replacement regimen each day? no       Physical Activity Over the Past Week:    Aerobic exercise: 95 min  Resistance exercise: 0 workouts / week

## 2019-02-13 ENCOUNTER — CLINICAL SUPPORT (OUTPATIENT)
Dept: FAMILY MEDICINE CLINIC | Age: 74
End: 2019-02-13

## 2019-02-13 DIAGNOSIS — E66.9 OBESITY, UNSPECIFIED CLASSIFICATION, UNSPECIFIED OBESITY TYPE, UNSPECIFIED WHETHER SERIOUS COMORBIDITY PRESENT: Primary | ICD-10-CM

## 2019-02-14 VITALS
HEIGHT: 56 IN | HEART RATE: 70 BPM | DIASTOLIC BLOOD PRESSURE: 86 MMHG | WEIGHT: 203 LBS | BODY MASS INDEX: 45.67 KG/M2 | SYSTOLIC BLOOD PRESSURE: 120 MMHG

## 2019-02-14 NOTE — PROGRESS NOTES
Progress Note: Weekly Medical Monitoring in the [facility]   
Is there anything that you or the patient needs to let the supervising provider know about? no 
 
Over the past week, have you experienced any side-effects? no 
 
Dot Ramirez is a 68 y.o. female who is enrolled in [facility] 
 
Dot Ramirez was prescribed the VLCD / LCD. Visit Vitals /86 Pulse 70 Ht 4' 8\" (1.422 m) Wt 92.1 kg (203 lb) BMI 45.51 kg/m² Weight Metrics 2/13/2019 2/7/2019 2/6/2019 2/1/2019 1/30/2019 1/25/2019 1/25/2019 Weight 203 lb - 205 lb 1.6 oz - 205 lb 9.6 oz - 206 lb 3.2 oz Waist Measure Inches - 40.25 - 40.5 - 41.5 - BMI 45.51 kg/m2 - 45.98 kg/m2 - 46.09 kg/m2 - 46.23 kg/m2 Have you received any other medical care this week? no  If yes, where and for what? Have you had any change in your medications since your last visit? no  If yes what? Did you have any problems adhering to the program last week? no  If yes, please explain:  
 
 
Eating Habits Over Last Week: 
Did you take in 64 oz of non-caloric fluids? yes Did you consume your prescribed meal replacement regimen each day? yes Physical Activity Over the Past Week: 
 
Aerobic exercise: 60 min Resistance exercise: 0 workouts / week

## 2019-02-20 ENCOUNTER — CLINICAL SUPPORT (OUTPATIENT)
Dept: FAMILY MEDICINE CLINIC | Age: 74
End: 2019-02-20

## 2019-02-20 VITALS
DIASTOLIC BLOOD PRESSURE: 76 MMHG | SYSTOLIC BLOOD PRESSURE: 116 MMHG | HEIGHT: 56 IN | BODY MASS INDEX: 45.6 KG/M2 | HEART RATE: 66 BPM | WEIGHT: 202.7 LBS

## 2019-02-20 DIAGNOSIS — E66.9 OBESITY, UNSPECIFIED CLASSIFICATION, UNSPECIFIED OBESITY TYPE, UNSPECIFIED WHETHER SERIOUS COMORBIDITY PRESENT: Primary | ICD-10-CM

## 2019-02-20 NOTE — PROGRESS NOTES
Chief Complaint   Patient presents with    Weight Management     Progress Note: Weekly Medical Monitoring in the [facility]    Is there anything that you or the patient needs to let the supervising provider know about? no    Over the past week, have you experienced any side-effects? no    Torrey Villareal is a 68 y.o. female who is enrolled in [facility]    Torrey Villareal was prescribed the VLCD / LCD. Visit Vitals  /76   Pulse 66   Ht 4' 8\" (1.422 m)   Wt 202 lb 11.2 oz (91.9 kg)   BMI 45.44 kg/m²     Weight Metrics 2/20/2019 2/14/2019 2/13/2019 2/7/2019 2/6/2019 2/1/2019 1/30/2019   Weight 202 lb 11.2 oz - 203 lb - 205 lb 1.6 oz - 205 lb 9.6 oz   Waist Measure Inches 41 40.5 - 40.25 - 40.5 -   BMI 45.44 kg/m2 - 45.51 kg/m2 - 45.98 kg/m2 - 46.09 kg/m2         Have you received any other medical care this week? no  If yes, where and for what? Have you had any change in your medications since your last visit? no  If yes what? Did you have any problems adhering to the program last week? no  If yes, please explain:       Eating Habits Over Last Week:  Did you take in 64 oz of non-caloric fluids? no     Did you consume your prescribed meal replacement regimen each day?  yes       Physical Activity Over the Past Week:    Aerobic exercise: 35 min  Resistance exercise: 20 minute workous / week

## 2019-02-25 ENCOUNTER — HOSPITAL ENCOUNTER (OUTPATIENT)
Dept: LAB | Age: 74
Discharge: HOME | End: 2019-02-25
Payer: MEDICARE

## 2019-02-25 DIAGNOSIS — Z71.3 WEIGHT LOSS COUNSELING, ENCOUNTER FOR: ICD-10-CM

## 2019-02-25 DIAGNOSIS — E66.01 OBESITY, MORBID (HCC): ICD-10-CM

## 2019-02-25 DIAGNOSIS — R63.4 RAPID WEIGHT LOSS: ICD-10-CM

## 2019-02-25 LAB
ALBUMIN SERPL-MCNC: 3.6 G/DL (ref 3.4–5)
ALBUMIN/GLOB SERPL: 0.9 {RATIO} (ref 0.8–1.7)
ALP SERPL-CCNC: 74 U/L (ref 45–117)
ALT SERPL-CCNC: 28 U/L (ref 13–56)
ANION GAP SERPL CALC-SCNC: 4 MMOL/L (ref 3–18)
AST SERPL-CCNC: 24 U/L (ref 15–37)
BILIRUB SERPL-MCNC: 0.4 MG/DL (ref 0.2–1)
BUN SERPL-MCNC: 31 MG/DL (ref 7–18)
BUN/CREAT SERPL: 32 (ref 12–20)
CALCIUM SERPL-MCNC: 9.6 MG/DL (ref 8.5–10.1)
CHLORIDE SERPL-SCNC: 105 MMOL/L (ref 100–108)
CO2 SERPL-SCNC: 30 MMOL/L (ref 21–32)
CREAT SERPL-MCNC: 0.98 MG/DL (ref 0.6–1.3)
GLOBULIN SER CALC-MCNC: 4.1 G/DL (ref 2–4)
GLUCOSE SERPL-MCNC: 85 MG/DL (ref 74–99)
POTASSIUM SERPL-SCNC: 4.1 MMOL/L (ref 3.5–5.5)
PROT SERPL-MCNC: 7.7 G/DL (ref 6.4–8.2)
SODIUM SERPL-SCNC: 139 MMOL/L (ref 136–145)
URATE SERPL-MCNC: 5.5 MG/DL (ref 2.6–7.2)

## 2019-02-25 PROCEDURE — 84550 ASSAY OF BLOOD/URIC ACID: CPT

## 2019-02-25 PROCEDURE — 36415 COLL VENOUS BLD VENIPUNCTURE: CPT

## 2019-02-25 PROCEDURE — 80053 COMPREHEN METABOLIC PANEL: CPT

## 2019-02-26 ENCOUNTER — DOCUMENTATION ONLY (OUTPATIENT)
Dept: BARIATRICS/WEIGHT MGMT | Age: 74
End: 2019-02-26

## 2019-02-26 ENCOUNTER — OFFICE VISIT (OUTPATIENT)
Dept: SURGERY | Age: 74
End: 2019-02-26

## 2019-02-26 VITALS
OXYGEN SATURATION: 98 % | DIASTOLIC BLOOD PRESSURE: 74 MMHG | HEIGHT: 56 IN | RESPIRATION RATE: 18 BRPM | TEMPERATURE: 97.6 F | WEIGHT: 203 LBS | SYSTOLIC BLOOD PRESSURE: 126 MMHG | BODY MASS INDEX: 45.67 KG/M2 | HEART RATE: 61 BPM

## 2019-02-26 DIAGNOSIS — Z71.3 WEIGHT LOSS COUNSELING, ENCOUNTER FOR: ICD-10-CM

## 2019-02-26 DIAGNOSIS — R63.4 RAPID WEIGHT LOSS: ICD-10-CM

## 2019-02-26 DIAGNOSIS — L65.9 HAIR LOSS: ICD-10-CM

## 2019-02-26 DIAGNOSIS — E66.01 OBESITY, MORBID (HCC): Primary | ICD-10-CM

## 2019-02-26 DIAGNOSIS — R26.89 CROUCHED GAIT: ICD-10-CM

## 2019-02-26 DIAGNOSIS — R53.83 FATIGUE, UNSPECIFIED TYPE: ICD-10-CM

## 2019-02-26 NOTE — PROGRESS NOTES
Nursing Note for Medical Monitoring in the [facility]      Gemini Cuevas is a 68 y.o. female who is enrolled in [facility]    Gemini Cuevas was prescribed the VLCD / LCD. Did you have any problems adhering to the program last week? no  If yes, please explain:     Since your last visit, have you experienced any complications? Hair loss  Have you received any other medical care this week? no  If yes, where and for what? Have you had any change in your medications since your last visit? no  If yes what? Are you taking an appetite suppressant? no   If yes:  Do you need a refill? BP Readings from Last 3 Encounters:   02/26/19 126/74   02/20/19 116/76   02/14/19 120/86        Eating Habits Over Last Week:  Did you take in 64 oz of non-caloric fluids? no     Did you consume your prescribed meal replacement regimen each day?  yes       Physical Activity Over the Past Week:    Aerobic exercise: 60 min  Resistance exercise: 0 workouts / week

## 2019-02-26 NOTE — PROGRESS NOTES
2/26/19:  Patient was left a voicemail asking to call me to review the LCD on the [facility] program.    Corey Gibson MS RD

## 2019-02-26 NOTE — Clinical Note
Adding in some whole foods 2 MR and 2 meals, please contact the pt for some guidance on what whole foods to use, I gave general instructions, will stay in weekly classes at this time until we hear from ortho on if she is ready for joint replacement sx

## 2019-03-06 ENCOUNTER — CLINICAL SUPPORT (OUTPATIENT)
Dept: FAMILY MEDICINE CLINIC | Age: 74
End: 2019-03-06

## 2019-03-06 DIAGNOSIS — E66.9 OBESITY, UNSPECIFIED CLASSIFICATION, UNSPECIFIED OBESITY TYPE, UNSPECIFIED WHETHER SERIOUS COMORBIDITY PRESENT: Primary | ICD-10-CM

## 2019-03-07 VITALS
BODY MASS INDEX: 45.3 KG/M2 | DIASTOLIC BLOOD PRESSURE: 76 MMHG | SYSTOLIC BLOOD PRESSURE: 114 MMHG | HEART RATE: 70 BPM | WEIGHT: 201.4 LBS | HEIGHT: 56 IN

## 2019-03-13 ENCOUNTER — CLINICAL SUPPORT (OUTPATIENT)
Dept: FAMILY MEDICINE CLINIC | Age: 74
End: 2019-03-13

## 2019-03-13 DIAGNOSIS — E66.9 OBESITY, UNSPECIFIED CLASSIFICATION, UNSPECIFIED OBESITY TYPE, UNSPECIFIED WHETHER SERIOUS COMORBIDITY PRESENT: Primary | ICD-10-CM

## 2019-03-14 VITALS
HEIGHT: 56 IN | WEIGHT: 200.6 LBS | BODY MASS INDEX: 45.12 KG/M2 | DIASTOLIC BLOOD PRESSURE: 76 MMHG | HEART RATE: 66 BPM | SYSTOLIC BLOOD PRESSURE: 124 MMHG

## 2019-03-14 NOTE — PROGRESS NOTES
Progress Note: Weekly Medical Monitoring in the [facility]    Is there anything that you or the patient needs to let the supervising provider know about? no    Over the past week, have you experienced any side-effects? no    Sherrill Hyman is a 68 y.o. female who is enrolled in [facility]    Sherrill Hyman was prescribed the VLCD / LCD. Visit Vitals  /76   Pulse 66   Ht 4' 8\" (1.422 m)   Wt 91 kg (200 lb 9.6 oz)   BMI 44.97 kg/m²     Weight Metrics 3/14/2019 3/13/2019 3/7/2019 3/6/2019 2/26/2019 2/26/2019 2/20/2019   Weight - 200 lb 9.6 oz - 201 lb 6.4 oz - 203 lb 202 lb 11.2 oz   Waist Measure Inches 39.25 - 46 - 41.5 - 41   BMI - 44.97 kg/m2 - 45.15 kg/m2 - 45.51 kg/m2 45.44 kg/m2         Have you received any other medical care this week? No  If yes, where and for what? Have you had any change in your medications since your last visit? no  If yes what? Did you have any problems adhering to the program last week? no  If yes, please explain:       Eating Habits Over Last Week:  Did you take in 64 oz of non-caloric fluids? no     Did you consume your prescribed meal replacement regimen each day?  yes       Physical Activity Over the Past Week:    Aerobic exercise: 0 min  Resistance exercise: 1 workouts / week

## 2019-03-22 ENCOUNTER — HOSPITAL ENCOUNTER (OUTPATIENT)
Dept: LAB | Age: 74
Discharge: HOME | End: 2019-03-22
Payer: MEDICARE

## 2019-03-22 ENCOUNTER — CLINICAL SUPPORT (OUTPATIENT)
Dept: FAMILY MEDICINE CLINIC | Age: 74
End: 2019-03-22

## 2019-03-22 VITALS
SYSTOLIC BLOOD PRESSURE: 121 MMHG | WEIGHT: 198.4 LBS | HEIGHT: 56 IN | DIASTOLIC BLOOD PRESSURE: 86 MMHG | HEART RATE: 64 BPM | BODY MASS INDEX: 44.63 KG/M2

## 2019-03-22 DIAGNOSIS — Z71.3 WEIGHT LOSS COUNSELING, ENCOUNTER FOR: ICD-10-CM

## 2019-03-22 DIAGNOSIS — E66.01 OBESITY, MORBID (HCC): ICD-10-CM

## 2019-03-22 DIAGNOSIS — L65.9 HAIR LOSS: ICD-10-CM

## 2019-03-22 DIAGNOSIS — E66.9 OBESITY, UNSPECIFIED CLASSIFICATION, UNSPECIFIED OBESITY TYPE, UNSPECIFIED WHETHER SERIOUS COMORBIDITY PRESENT: Primary | ICD-10-CM

## 2019-03-22 DIAGNOSIS — R63.4 RAPID WEIGHT LOSS: ICD-10-CM

## 2019-03-22 DIAGNOSIS — R26.89 CROUCHED GAIT: ICD-10-CM

## 2019-03-22 DIAGNOSIS — R53.83 FATIGUE, UNSPECIFIED TYPE: ICD-10-CM

## 2019-03-22 LAB
ALBUMIN SERPL-MCNC: 3.5 G/DL (ref 3.4–5)
ALBUMIN/GLOB SERPL: 0.9 {RATIO} (ref 0.8–1.7)
ALP SERPL-CCNC: 78 U/L (ref 45–117)
ALT SERPL-CCNC: 23 U/L (ref 13–56)
ANION GAP SERPL CALC-SCNC: 6 MMOL/L (ref 3–18)
AST SERPL-CCNC: 19 U/L (ref 15–37)
BASOPHILS # BLD: 0.1 K/UL (ref 0–0.1)
BASOPHILS NFR BLD: 1 % (ref 0–2)
BILIRUB SERPL-MCNC: 0.5 MG/DL (ref 0.2–1)
BUN SERPL-MCNC: 26 MG/DL (ref 7–18)
BUN/CREAT SERPL: 27 (ref 12–20)
CALCIUM SERPL-MCNC: 9.9 MG/DL (ref 8.5–10.1)
CHLORIDE SERPL-SCNC: 104 MMOL/L (ref 100–108)
CO2 SERPL-SCNC: 29 MMOL/L (ref 21–32)
CREAT SERPL-MCNC: 0.95 MG/DL (ref 0.6–1.3)
DIFFERENTIAL METHOD BLD: ABNORMAL
EOSINOPHIL # BLD: 0.2 K/UL (ref 0–0.4)
EOSINOPHIL NFR BLD: 3 % (ref 0–5)
ERYTHROCYTE [DISTWIDTH] IN BLOOD BY AUTOMATED COUNT: 15.1 % (ref 11.6–14.5)
FOLATE SERPL-MCNC: >20 NG/ML (ref 3.1–17.5)
GLOBULIN SER CALC-MCNC: 3.9 G/DL (ref 2–4)
GLUCOSE SERPL-MCNC: 91 MG/DL (ref 74–99)
HCT VFR BLD AUTO: 36.1 % (ref 35–45)
HGB BLD-MCNC: 11.6 G/DL (ref 12–16)
LYMPHOCYTES # BLD: 2.6 K/UL (ref 0.9–3.6)
LYMPHOCYTES NFR BLD: 38 % (ref 21–52)
MAGNESIUM SERPL-MCNC: 2.3 MG/DL (ref 1.6–2.6)
MCH RBC QN AUTO: 26.7 PG (ref 24–34)
MCHC RBC AUTO-ENTMCNC: 32.1 G/DL (ref 31–37)
MCV RBC AUTO: 83.2 FL (ref 74–97)
MONOCYTES # BLD: 0.6 K/UL (ref 0.05–1.2)
MONOCYTES NFR BLD: 8 % (ref 3–10)
NEUTS SEG # BLD: 3.4 K/UL (ref 1.8–8)
NEUTS SEG NFR BLD: 50 % (ref 40–73)
PLATELET # BLD AUTO: 252 K/UL (ref 135–420)
PMV BLD AUTO: 11.5 FL (ref 9.2–11.8)
POTASSIUM SERPL-SCNC: 4.1 MMOL/L (ref 3.5–5.5)
PROT SERPL-MCNC: 7.4 G/DL (ref 6.4–8.2)
RBC # BLD AUTO: 4.34 M/UL (ref 4.2–5.3)
SODIUM SERPL-SCNC: 139 MMOL/L (ref 136–145)
URATE SERPL-MCNC: 5.6 MG/DL (ref 2.6–7.2)
VIT B12 SERPL-MCNC: >2000 PG/ML (ref 211–911)
WBC # BLD AUTO: 6.8 K/UL (ref 4.6–13.2)

## 2019-03-22 PROCEDURE — 84550 ASSAY OF BLOOD/URIC ACID: CPT

## 2019-03-22 PROCEDURE — 83735 ASSAY OF MAGNESIUM: CPT

## 2019-03-22 PROCEDURE — 85025 COMPLETE CBC W/AUTO DIFF WBC: CPT

## 2019-03-22 PROCEDURE — 84425 ASSAY OF VITAMIN B-1: CPT

## 2019-03-22 PROCEDURE — 82607 VITAMIN B-12: CPT

## 2019-03-22 PROCEDURE — 80053 COMPREHEN METABOLIC PANEL: CPT

## 2019-03-22 PROCEDURE — 36415 COLL VENOUS BLD VENIPUNCTURE: CPT

## 2019-03-22 NOTE — PROGRESS NOTES
Chief Complaint   Patient presents with    Weight Management     Progress Note: Weekly Medical Monitoring in the [facility]    Is there anything that you or the patient needs to let the supervising provider know about? no    Over the past week, have you experienced any side-effects? no    Radha Culver is a 68 y.o. female who is enrolled in [facility]    Radha Culver was prescribed the VLCD / LCD. Visit Vitals  /86   Pulse 64   Ht 4' 8\" (1.422 m)   Wt 198 lb 6.4 oz (90 kg)   BMI 44.48 kg/m²     Weight Metrics 3/22/2019 3/22/2019 3/14/2019 3/13/2019 3/7/2019 3/6/2019 2/26/2019   Weight - 198 lb 6.4 oz - 200 lb 9.6 oz - 201 lb 6.4 oz -   Waist Measure Inches 39.5 - 39.25 - 46 - 41.5   BMI - 44.48 kg/m2 - 44.97 kg/m2 - 45.15 kg/m2 -         Have you received any other medical care this week? no  If yes, where and for what? Have you had any change in your medications since your last visit? no  If yes what? Did you have any problems adhering to the program last week? no  If yes, please explain:       Eating Habits Over Last Week:  Did you take in 64 oz of non-caloric fluids?  no     Did you consume your prescribed meal replacement regimen each day? no       Physical Activity Over the Past Week:    Aerobic exercise: 15 min  Resistance exercise: 30 minutes

## 2019-03-26 ENCOUNTER — OFFICE VISIT (OUTPATIENT)
Dept: SURGERY | Age: 74
End: 2019-03-26

## 2019-03-26 VITALS
HEART RATE: 68 BPM | TEMPERATURE: 98.2 F | RESPIRATION RATE: 20 BRPM | WEIGHT: 201.1 LBS | HEIGHT: 57 IN | SYSTOLIC BLOOD PRESSURE: 132 MMHG | DIASTOLIC BLOOD PRESSURE: 80 MMHG | BODY MASS INDEX: 43.39 KG/M2

## 2019-03-26 DIAGNOSIS — R63.4 RAPID WEIGHT LOSS: ICD-10-CM

## 2019-03-26 DIAGNOSIS — E66.01 OBESITY, MORBID (HCC): Primary | ICD-10-CM

## 2019-03-26 DIAGNOSIS — I10 ESSENTIAL HYPERTENSION: ICD-10-CM

## 2019-03-26 DIAGNOSIS — Z71.3 WEIGHT LOSS COUNSELING, ENCOUNTER FOR: ICD-10-CM

## 2019-03-26 DIAGNOSIS — G47.33 OBSTRUCTIVE SLEEP APNEA: ICD-10-CM

## 2019-03-26 LAB — VIT B1 BLD-SCNC: 97.3 NMOL/L (ref 66.5–200)

## 2019-03-26 NOTE — PROGRESS NOTES
Nursing Note for Medical Monitoring in the [facility]      Fanta Orozco is a 68 y.o. female who is enrolled in [facility]    Fanta Orozco was prescribed the VLCD / LCD. Did you have any problems adhering to the program last week? no  If yes, please explain:     Since your last visit, have you experienced any complications? no    Have you received any other medical care this week? yes  If yes, where and for what? Have you had any change in your medications since your last visit? no  If yes what? Are you taking an appetite suppressant? no   If yes:  Do you need a refill? BP Readings from Last 3 Encounters:   03/26/19 132/80   03/22/19 121/86   03/14/19 124/76        Eating Habits Over Last Week:  Did you take in 64 oz of non-caloric fluids? no     Did you consume your prescribed meal replacement regimen each day?  yes       Physical Activity Over the Past Week:    Aerobic exercise: 45 min  Resistance exercise: 3 workouts / week

## 2019-03-26 NOTE — PROGRESS NOTES
[facility] Progress Note:   F/up Provider Visit    CC: Weight Management    Michael Michaels is a 68 y.o. female who is here for her  f/up medical provider visit for the VLCD Program. she did attend class last week. Suffering from morbid obesity and comorbid conditions HTN, FABIO, DM 2 controlled with diet. She is ready to move to LCD. Weight History  Weight Metrics 3/26/2019 3/26/2019 3/22/2019 3/22/2019 3/14/2019 3/13/2019 3/7/2019   Weight - 201 lb 1.6 oz - 198 lb 6.4 oz - 200 lb 9.6 oz -   Waist Measure Inches 44.5 - 39.5 - 39.25 - 46   BMI - 43.52 kg/m2 - 44.48 kg/m2 - 44.97 kg/m2 -       Starting wt: 228 lbs  Goal wt: 190lbs     Ideal body weight: 45.5 kg (100 lb 5.7 oz)  Adjusted ideal body weight: 63.8 kg (140 lb 10.5 oz)  Body mass index is 43.52 kg/m². History    Past Medical History:   Diagnosis Date    Arthritis     Diabetes ([facility] [address] 2016    no meds    Hypertension     Morbid obesity ([facility] [address]     Sleep apnea     CPAP machine    Use of cane as ambulatory aid        Past Surgical History:   Procedure Laterality Date    HX COLONOSCOPY      HX GYN          HX HEENT      cataract right and left    HX HYSTERECTOMY      HX ORTHOPAEDIC Right     shoulder surgery    HX OTHER SURGICAL      Nephrectomy       Current Outpatient Medications   Medication Sig Dispense Refill    calcium carbonate (CALCIUM 500 PO) Take 500 mg by mouth.  cholecalciferol (VITAMIN D3) 1,000 unit tablet 1,000 Units.  acetaminophen (TYLENOL) 325 mg tablet Take  by mouth every four (4) hours as needed for Pain. Indications: BACK PAIN, PAIN      multivitamin (ONE A DAY) tablet Take 1 Tab by mouth daily.  losartan (COZAAR) 25 mg tablet Take 25 mg by mouth daily.  diclofenac (VOLTAREN) 1 % gel Apply 2 g to affected area as needed. Indications: OSTEOARTHRITIS      calcium-cholecalciferol, d3, (CALCIUM 600 + D) 600-125 mg-unit tab Take  by mouth daily.          No Known Allergies    Social History     Tobacco Use    Smoking status: Never Smoker    Smokeless tobacco: Never Used   Substance Use Topics    Alcohol use: No    Drug use: No       Family History   Problem Relation Age of Onset    Diabetes Mother     Diabetes Sister        Family Status   Relation Name Status    Mother  (Not Specified)    Sister  (Not Specified)         Medications:  Outpatient Medications Marked as Taking for the 3/26/19 encounter (Office Visit) with Larry Leon NP   Medication Sig Dispense Refill    calcium carbonate (CALCIUM 500 PO) Take 500 mg by mouth.  cholecalciferol (VITAMIN D3) 1,000 unit tablet 1,000 Units.  acetaminophen (TYLENOL) 325 mg tablet Take  by mouth every four (4) hours as needed for Pain. Indications: BACK PAIN, PAIN      multivitamin (ONE A DAY) tablet Take 1 Tab by mouth daily.  losartan (COZAAR) 25 mg tablet Take 25 mg by mouth daily.  diclofenac (VOLTAREN) 1 % gel Apply 2 g to affected area as needed. Indications: OSTEOARTHRITIS           Review of Systems  Review of Systems   Constitutional: Positive for malaise/fatigue and weight loss. All other systems reviewed and are negative. Objective  Visit Vitals  /80   Pulse 68   Temp 98.2 °F (36.8 °C)   Resp 20   Ht 4' 9\" (1.448 m)   Wt 91.2 kg (201 lb 1.6 oz)   BMI 43.52 kg/m²     No LMP recorded. Patient has had a hysterectomy. Physical Exam  Physical Exam   Constitutional: She is oriented to person, place, and time. She appears well-developed and well-nourished. HENT:   Head: Normocephalic. Cardiovascular: Normal rate. Pulmonary/Chest: Effort normal.   Musculoskeletal:   [place] gait, walking with cane   Neurological: She is alert and oriented to person, place, and time. Skin: Skin is warm and dry. Psychiatric: She has a normal mood and affect. Nursing note and vitals reviewed. Assessment / Plan    1.   Weight management    Degree of control: doing well but would like to add whole foods   Progress was reviewed with patient.     Goal(s) for next appointment:   -3lbs   2 MR and 2 meals, focus on protein and veggies -- rec RD to review appropriate foods     Pt will stay in weekly classes at this time until we hear from ortho on if she is ready for joint replacement sx.           >50% of 20 min visit spent counseling     Geovanny Hale, ELLIP-BC

## 2019-03-26 NOTE — Clinical Note
Going LCD but says she never talks with you Shawna Shukla only her daughter speaks with you the pt would like to talk with you-- phone call is ok

## 2019-03-28 PROBLEM — Z90.5 ABSENT KIDNEY, ACQUIRED: Status: ACTIVE | Noted: 2019-03-28

## 2019-04-26 ENCOUNTER — OFFICE VISIT (OUTPATIENT)
Dept: SURGERY | Age: 74
End: 2019-04-26

## 2019-04-26 VITALS
BODY MASS INDEX: 42.5 KG/M2 | SYSTOLIC BLOOD PRESSURE: 136 MMHG | TEMPERATURE: 98.4 F | RESPIRATION RATE: 22 BRPM | DIASTOLIC BLOOD PRESSURE: 81 MMHG | HEART RATE: 62 BPM | WEIGHT: 197 LBS | HEIGHT: 57 IN | OXYGEN SATURATION: 96 %

## 2019-04-26 DIAGNOSIS — E66.01 OBESITY, MORBID (HCC): Primary | ICD-10-CM

## 2019-04-26 DIAGNOSIS — G47.33 OBSTRUCTIVE SLEEP APNEA: ICD-10-CM

## 2019-04-26 DIAGNOSIS — I10 ESSENTIAL HYPERTENSION: ICD-10-CM

## 2019-04-26 NOTE — PROGRESS NOTES
[facility] Progress Note:   F/up Provider Visit    CC: Weight Management    Bruno Simpson is a 68 y.o. female who is here for her  f/up medical provider visit for the VLCD Program. she did attend class last week. Suffering from morbid obesity and comorbid conditions HTN, FABIO, DM 2 controlled with diet. Doing well on LCD, -4 lbs. Overall weight loss: 31 lbs     Weight History  Weight Metrics 2019 2019 3/26/2019 3/26/2019 3/22/2019 3/22/2019 3/14/2019   Weight - 197 lb - 201 lb 1.6 oz - 198 lb 6.4 oz -   Waist Measure Inches 40 - 44.5 - 39.5 - 39.25   BMI - 42.63 kg/m2 - 43.52 kg/m2 - 44.48 kg/m2 -       Starting wt: 228 lbs  Goal wt: 190lbs     Ideal body weight: 45.5 kg (100 lb 5.7 oz)  Adjusted ideal body weight: 63.1 kg (139 lb 0.2 oz)  Body mass index is 42.63 kg/m². History    Past Medical History:   Diagnosis Date    Arthritis     Diabetes ([facility] [address] 2016    no meds    Hypertension     Morbid obesity ([facility] [address]     Sleep apnea     CPAP machine    Use of cane as ambulatory aid        Past Surgical History:   Procedure Laterality Date    HX COLONOSCOPY      HX GYN          HX HEENT      cataract right and left    HX HYSTERECTOMY      HX ORTHOPAEDIC Right     shoulder surgery    HX OTHER SURGICAL      Nephrectomy       Current Outpatient Medications   Medication Sig Dispense Refill    calcium carbonate (CALCIUM 500 PO) Take 500 mg by mouth.  cholecalciferol (VITAMIN D3) 1,000 unit tablet 1,000 Units.  acetaminophen (TYLENOL) 325 mg tablet Take  by mouth every four (4) hours as needed for Pain. Indications: BACK PAIN, PAIN      multivitamin (ONE A DAY) tablet Take 1 Tab by mouth daily.  calcium-cholecalciferol, d3, (CALCIUM 600 + D) 600-125 mg-unit tab Take  by mouth daily.  losartan (COZAAR) 25 mg tablet Take 25 mg by mouth daily.  diclofenac (VOLTAREN) 1 % gel Apply 2 g to affected area as needed.  Indications: OSTEOARTHRITIS No Known Allergies    Social History     Tobacco Use    Smoking status: Never Smoker    Smokeless tobacco: Never Used   Substance Use Topics    Alcohol use: No    Drug use: No       Family History   Problem Relation Age of Onset    Diabetes Mother     Diabetes Sister        Family Status   Relation Name Status    Mother  (Not Specified)    Sister  (Not Specified)         Medications:     Current Outpatient Medications:     calcium carbonate (CALCIUM 500 PO), Take 500 mg by mouth., Disp: , Rfl:     cholecalciferol (VITAMIN D3) 1,000 unit tablet, 1,000 Units. , Disp: , Rfl:     acetaminophen (TYLENOL) 325 mg tablet, Take  by mouth every four (4) hours as needed for Pain. Indications: BACK PAIN, PAIN, Disp: , Rfl:     multivitamin (ONE A DAY) tablet, Take 1 Tab by mouth daily. , Disp: , Rfl:     calcium-cholecalciferol, d3, (CALCIUM 600 + D) 600-125 mg-unit tab, Take  by mouth daily. , Disp: , Rfl:     losartan (COZAAR) 25 mg tablet, Take 25 mg by mouth daily. , Disp: , Rfl:     diclofenac (VOLTAREN) 1 % gel, Apply 2 g to affected area as needed. Indications: OSTEOARTHRITIS, Disp: , Rfl:           Review of Systems  Review of Systems   Constitutional: Positive for malaise/fatigue and weight loss. All other systems reviewed and are negative. Objective  Visit Vitals  /81   Pulse 62   Temp 98.4 °F (36.9 °C) (Oral)   Resp 22   Ht 4' 9\" (1.448 m)   Wt 89.4 kg (197 lb)   SpO2 96%   BMI 42.63 kg/m²     No LMP recorded. Patient has had a hysterectomy. Physical Exam  Physical Exam   Constitutional: She is oriented to person, place, and time. She appears well-developed and well-nourished. HENT:   Head: Normocephalic. Cardiovascular: Normal rate. Pulmonary/Chest: Effort normal.   Musculoskeletal:    walking with cane   Neurological: She is alert and oriented to person, place, and time. Skin: Skin is warm and dry. Psychiatric: She has a normal mood and affect.    Nursing note and vitals reviewed. Assessment / Plan    1. Weight management    Degree of control: doing well, continue LCD    Progress was reviewed with patient. Goal(s) for next appointment:   -4lbs   2 MR and 2 meals, focus on protein and veggies  Pt will stay in weekly classes at this time until we hear from ortho on if she is ready for joint replacement sx.     Plans to go to 1 MR /star maint in diet in June will see RD prior to this if it's the tx option we take    Cont CPAP for FABIO and follow up with sleep specialist.   Will let us know when ortho schedules her.      >50% of 20 min visit spent counseling     Charmaine Mckeon, ELLIP-BC

## 2019-04-26 NOTE — Clinical Note
Just MARIO morales diet planned for June but may stay on LCD if she does not get on schedule for knee sx; doing well but slow loss even for the LCD

## 2019-04-26 NOTE — PROGRESS NOTES
Progress Note: Weekly Medical Monitoring in the [facility]    Is there anything that you or the patient needs to let the supervising provider know about? no    Over the past week, have you experienced any side-effects? no    Jose David Batista is a 68 y.o. female who is enrolled in [facility]    Jose David Batista was prescribed the VLCD / LCD. Visit Vitals  /81   Pulse 62   Temp 98.4 °F (36.9 °C) (Oral)   Resp 22   Ht 4' 9\" (1.448 m)   Wt 89.4 kg (197 lb)   SpO2 96%   BMI 42.63 kg/m²     Weight Metrics 4/26/2019 4/26/2019 3/26/2019 3/26/2019 3/22/2019 3/22/2019 3/14/2019   Weight - 197 lb - 201 lb 1.6 oz - 198 lb 6.4 oz -   Waist Measure Inches 40 - 44.5 - 39.5 - 39.25   BMI - 42.63 kg/m2 - 43.52 kg/m2 - 44.48 kg/m2 -         Have you received any other medical care this week? yes  If yes, where and for what? dentist    Have you had any change in your medications since your last visit? no  If yes what? Did you have any problems adhering to the program last week? yes  If yes, please explain:       Eating Habits Over Last Week:  Did you take in 64 oz of non-caloric fluids? No, got 48 but cant get to 64    Did you consume your prescribed meal replacement regimen each day?  yes       Physical Activity Over the Past Week:    Aerobic exercise: walking some throughout the week  Resistance exercise: 0

## 2019-05-22 ENCOUNTER — OFFICE VISIT (OUTPATIENT)
Dept: SURGERY | Age: 74
End: 2019-05-22

## 2019-05-22 VITALS
TEMPERATURE: 97.6 F | SYSTOLIC BLOOD PRESSURE: 130 MMHG | OXYGEN SATURATION: 98 % | WEIGHT: 196 LBS | HEIGHT: 57 IN | HEART RATE: 58 BPM | DIASTOLIC BLOOD PRESSURE: 78 MMHG | BODY MASS INDEX: 42.28 KG/M2 | RESPIRATION RATE: 20 BRPM

## 2019-05-22 DIAGNOSIS — I10 ESSENTIAL HYPERTENSION: ICD-10-CM

## 2019-05-22 DIAGNOSIS — G47.33 OBSTRUCTIVE SLEEP APNEA: ICD-10-CM

## 2019-05-22 DIAGNOSIS — E66.01 OBESITY, MORBID (HCC): Primary | ICD-10-CM

## 2019-05-22 DIAGNOSIS — E11.9 TYPE 2 DIABETES MELLITUS WITHOUT COMPLICATION, UNSPECIFIED WHETHER LONG TERM INSULIN USE (HCC): ICD-10-CM

## 2019-05-22 NOTE — PROGRESS NOTES
Chief Complaint   Patient presents with    Weight Management   1. Have you been to the ER, urgent care clinic since your last visit? Hospitalized since your last visit? No    2. Have you seen or consulted any other health care providers outside of the [address] [place] [address] since your last visit? Include any pap smears or colon screening. No               Nursing Note for Medical Monitoring in the [facility]      Saniya Cain is a 68 y.o. female who is enrolled in [facility]    Saniya Cain was prescribed the VLCD / LCD. Did you have any problems adhering to the program last week? no  If yes, please explain:     Since your last visit, have you experienced any complications? no    Have you received any other medical care this week? no  If yes, where and for what? Have you had any change in your medications since your last visit? no  If yes what? Are you taking an appetite suppressant? no   If yes:  Do you need a refill? BP Readings from Last 3 Encounters:   05/22/19 130/78   04/26/19 136/81   03/26/19 132/80        Eating Habits Over Last Week:  Did you take in 64 oz of non-caloric fluids?  no     Did you consume your prescribed meal replacement regimen each day? no       Physical Activity Over the Past Week:    Aerobic exercise: 35 min  Resistance exercise: 0 workouts / week

## 2019-05-22 NOTE — PROGRESS NOTES
[facility] Progress Note:   F/up Provider Visit    CC: Weight Management    Debbie Haile is a 68 y.o. female who is here for her  f/up medical provider visit for the VLCD Program. she did attend class last week. Suffering from morbid obesity and comorbid conditions HTN, FABIO, DM 2 controlled with diet. Doing fair on LCD, -1 lbs. Overall weight loss: 32 lbs     Weight History  Weight Metrics 2019 2019 2019 2019 3/26/2019 3/26/2019 3/22/2019   Weight - 196 lb - 197 lb - 201 lb 1.6 oz -   Waist Measure Inches 42 - 40 - 44.5 - 39.5   BMI - 42.41 kg/m2 - 42.63 kg/m2 - 43.52 kg/m2 -       Starting wt: 228 lbs  Goal wt: 190lbs     Ideal body weight: 45.5 kg (100 lb 5.7 oz)  Adjusted ideal body weight: 62.9 kg (138 lb 9.8 oz)  Body mass index is 42.41 kg/m². History    Past Medical History:   Diagnosis Date    Arthritis     Diabetes ([facility] [address] 2016    no meds    Hypertension     Morbid obesity ([facility] [address]     Sleep apnea     CPAP machine    Use of cane as ambulatory aid        Past Surgical History:   Procedure Laterality Date    HX COLONOSCOPY      HX GYN          HX HEENT      cataract right and left    HX HYSTERECTOMY      HX ORTHOPAEDIC Right     shoulder surgery    HX OTHER SURGICAL      Nephrectomy       Current Outpatient Medications   Medication Sig Dispense Refill    cholecalciferol (VITAMIN D3) 1,000 unit tablet 1,000 Units.  acetaminophen (TYLENOL) 325 mg tablet Take  by mouth every four (4) hours as needed for Pain. Indications: BACK PAIN, PAIN      multivitamin (ONE A DAY) tablet Take 1 Tab by mouth daily.  calcium-cholecalciferol, d3, (CALCIUM 600 + D) 600-125 mg-unit tab Take  by mouth daily.  losartan (COZAAR) 25 mg tablet Take 25 mg by mouth daily.  diclofenac (VOLTAREN) 1 % gel Apply 2 g to affected area as needed. Indications: OSTEOARTHRITIS      calcium carbonate (CALCIUM 500 PO) Take 500 mg by mouth.          No Known Allergies    Social History     Tobacco Use    Smoking status: Never Smoker    Smokeless tobacco: Never Used   Substance Use Topics    Alcohol use: No    Drug use: No       Family History   Problem Relation Age of Onset    Diabetes Mother     Diabetes Sister        Family Status   Relation Name Status    Mother  (Not Specified)    Sister  (Not Specified)         Medications:     Current Outpatient Medications:     cholecalciferol (VITAMIN D3) 1,000 unit tablet, 1,000 Units. , Disp: , Rfl:     acetaminophen (TYLENOL) 325 mg tablet, Take  by mouth every four (4) hours as needed for Pain. Indications: BACK PAIN, PAIN, Disp: , Rfl:     multivitamin (ONE A DAY) tablet, Take 1 Tab by mouth daily. , Disp: , Rfl:     calcium-cholecalciferol, d3, (CALCIUM 600 + D) 600-125 mg-unit tab, Take  by mouth daily. , Disp: , Rfl:     losartan (COZAAR) 25 mg tablet, Take 25 mg by mouth daily. , Disp: , Rfl:     diclofenac (VOLTAREN) 1 % gel, Apply 2 g to affected area as needed. Indications: OSTEOARTHRITIS, Disp: , Rfl:     calcium carbonate (CALCIUM 500 PO), Take 500 mg by mouth., Disp: , Rfl:           Review of Systems  Review of Systems   Constitutional: Positive for malaise/fatigue and weight loss. Musculoskeletal: Positive for joint pain. All other systems reviewed and are negative. Objective  Visit Vitals  /78   Pulse (!) 58   Temp 97.6 °F (36.4 °C)   Resp 20   Ht 4' 9\" (1.448 m)   Wt 88.9 kg (196 lb)   SpO2 98%   BMI 42.41 kg/m²     No LMP recorded. Patient has had a hysterectomy. Physical Exam  Physical Exam   Constitutional: She is oriented to person, place, and time. She appears well-developed and well-nourished. HENT:   Head: Normocephalic. Cardiovascular: Normal rate. Pulmonary/Chest: Effort normal.   Musculoskeletal:    walking with cane   Neurological: She is alert and oriented to person, place, and time. Skin: Skin is warm and dry.    Psychiatric: She has a normal mood and affect. Nursing note and vitals reviewed. Assessment / Plan    1. Weight management    Degree of control: continue LCD    Progress was reviewed with patient. Goal(s) for next appointment:   -4lbs   2 MR and 2 meals, focus on protein and veggies    Pt will stay in weekly classes at this time until we hear from ortho on if she is ready for joint replacement sx.     Plans to go to 1 MR /star maint in diet in June will see RD prior to this if it's the tx option we take    Cont CPAP for FABIO and follow up with sleep specialist.   Will let us know when ortho schedules her.      >50% of 20 min visit spent counseling     Bruno Brittle, FNP-BC

## 2019-05-22 NOTE — PATIENT INSTRUCTIONS
Transitioning to Low Calorie Diet (LCD)   2 [facility] products and 2 meals of food per day  OR   3 [facility] products and 1 meal of food per day    Each meal of food should have no more than:   3 ounces of lean meat    Unlimited non-starchy vegetables   2 fat exchanges (refer to exchange list for examples)   1 low glycemic fruit OR 1 dairy OR 1 starchy vegetable/legumes      Remember to pick 1 fruit or 1 legume or 1 dairy! Not all 3.     Low Glycemic fruit examples (1 small serving or < ½ cup)   Apple         Nectarine or Orange   Pear   Plum   Pomergranate   Tangerine   Tomato   Lemon or Lime   Apricot   Berries (all)   Cherries   Grapefruit (1/2)   Kiwi  Legumes and Starch Vegetables (1/2 cup portion)   Chick peas   Kidney beans   Lentils   Navy Beans   Soy Beans   Sweet Potato   Frozen Green Peas  Dairy (1 cup of yogurt with less than 10 grams of sugar)   Dannon Light and Fit Greek            [employer] Carb Master   [employer] Triple Zero [place]                    Siggi's Yogurt

## 2019-05-29 ENCOUNTER — HOSPITAL ENCOUNTER (OUTPATIENT)
Dept: PREADMISSION TESTING | Age: 74
Discharge: HOME | End: 2019-05-29
Payer: MEDICARE

## 2019-05-29 ENCOUNTER — HOSPITAL ENCOUNTER (OUTPATIENT)
Dept: LAB | Age: 74
Discharge: HOME | End: 2019-05-29
Payer: MEDICARE

## 2019-05-29 ENCOUNTER — HOSPITAL ENCOUNTER (OUTPATIENT)
Dept: GENERAL RADIOLOGY | Age: 74
Discharge: HOME | End: 2019-05-29
Payer: MEDICARE

## 2019-05-29 DIAGNOSIS — E11.9 DIABETES MELLITUS (HCC): ICD-10-CM

## 2019-05-29 DIAGNOSIS — I10 HIGH BLOOD PRESSURE: ICD-10-CM

## 2019-05-29 DIAGNOSIS — Z01.818 PREOPERATIVE TESTING: ICD-10-CM

## 2019-05-29 LAB
ALBUMIN SERPL-MCNC: 3.5 G/DL (ref 3.4–5)
ALBUMIN/GLOB SERPL: 0.9 {RATIO} (ref 0.8–1.7)
ALP SERPL-CCNC: 87 U/L (ref 45–117)
ALT SERPL-CCNC: 24 U/L (ref 13–56)
ANION GAP SERPL CALC-SCNC: 6 MMOL/L (ref 3–18)
APTT PPP: 33.9 SEC (ref 23–36.4)
AST SERPL-CCNC: 23 U/L (ref 15–37)
ATRIAL RATE: 65 BPM
BASOPHILS # BLD: 0 K/UL (ref 0–0.1)
BASOPHILS NFR BLD: 0 % (ref 0–2)
BILIRUB SERPL-MCNC: 0.5 MG/DL (ref 0.2–1)
BUN SERPL-MCNC: 29 MG/DL (ref 7–18)
BUN/CREAT SERPL: 27 (ref 12–20)
CALCIUM SERPL-MCNC: 9.1 MG/DL (ref 8.5–10.1)
CALCULATED P AXIS, ECG09: 65 DEGREES
CALCULATED R AXIS, ECG10: -22 DEGREES
CALCULATED T AXIS, ECG11: -4 DEGREES
CHLORIDE SERPL-SCNC: 106 MMOL/L (ref 100–108)
CO2 SERPL-SCNC: 30 MMOL/L (ref 21–32)
CREAT SERPL-MCNC: 1.06 MG/DL (ref 0.6–1.3)
DIAGNOSIS, 93000: NORMAL
DIFFERENTIAL METHOD BLD: ABNORMAL
EOSINOPHIL # BLD: 0.1 K/UL (ref 0–0.4)
EOSINOPHIL NFR BLD: 2 % (ref 0–5)
ERYTHROCYTE [DISTWIDTH] IN BLOOD BY AUTOMATED COUNT: 15.3 % (ref 11.6–14.5)
GLOBULIN SER CALC-MCNC: 3.9 G/DL (ref 2–4)
GLUCOSE SERPL-MCNC: 93 MG/DL (ref 74–99)
HBA1C MFR BLD: 5.7 % (ref 4.2–5.6)
HCT VFR BLD AUTO: 37.4 % (ref 35–45)
HGB BLD-MCNC: 11.8 G/DL (ref 12–16)
INR PPP: 1.1 (ref 0.8–1.2)
LYMPHOCYTES # BLD: 3.6 K/UL (ref 0.9–3.6)
LYMPHOCYTES NFR BLD: 51 % (ref 21–52)
MCH RBC QN AUTO: 26.5 PG (ref 24–34)
MCHC RBC AUTO-ENTMCNC: 31.6 G/DL (ref 31–37)
MCV RBC AUTO: 84 FL (ref 74–97)
MONOCYTES # BLD: 0.5 K/UL (ref 0.05–1.2)
MONOCYTES NFR BLD: 7 % (ref 3–10)
NEUTS SEG # BLD: 2.8 K/UL (ref 1.8–8)
NEUTS SEG NFR BLD: 40 % (ref 40–73)
P-R INTERVAL, ECG05: 184 MS
PLATELET # BLD AUTO: 251 K/UL (ref 135–420)
PMV BLD AUTO: 12.3 FL (ref 9.2–11.8)
POTASSIUM SERPL-SCNC: 4.5 MMOL/L (ref 3.5–5.5)
PROT SERPL-MCNC: 7.4 G/DL (ref 6.4–8.2)
PROTHROMBIN TIME: 14.1 SEC (ref 11.5–15.2)
Q-T INTERVAL, ECG07: 420 MS
QRS DURATION, ECG06: 132 MS
QTC CALCULATION (BEZET), ECG08: 436 MS
RBC # BLD AUTO: 4.45 M/UL (ref 4.2–5.3)
SODIUM SERPL-SCNC: 142 MMOL/L (ref 136–145)
VENTRICULAR RATE, ECG03: 65 BPM
WBC # BLD AUTO: 7 K/UL (ref 4.6–13.2)

## 2019-05-29 PROCEDURE — 80053 COMPREHEN METABOLIC PANEL: CPT

## 2019-05-29 PROCEDURE — 85730 THROMBOPLASTIN TIME PARTIAL: CPT

## 2019-05-29 PROCEDURE — 93005 ELECTROCARDIOGRAM TRACING: CPT

## 2019-05-29 PROCEDURE — 83036 HEMOGLOBIN GLYCOSYLATED A1C: CPT

## 2019-05-29 PROCEDURE — 36415 COLL VENOUS BLD VENIPUNCTURE: CPT

## 2019-05-29 PROCEDURE — 85610 PROTHROMBIN TIME: CPT

## 2019-05-29 PROCEDURE — 85025 COMPLETE CBC W/AUTO DIFF WBC: CPT

## 2019-05-29 RX ORDER — LOSARTAN POTASSIUM AND HYDROCHLOROTHIAZIDE 12.5; 5 MG/1; MG/1
1 TABLET ORAL DAILY
COMMUNITY
Start: 2019-03-05

## 2019-05-29 RX ORDER — LATANOPROST 50 UG/ML
1 SOLUTION/ DROPS OPHTHALMIC
COMMUNITY

## 2019-05-29 NOTE — PERIOP NOTES
PAT - SURGICAL PRE-ADMISSION INSTRUCTIONS    NAME:  Carley Toro BIANCA Walls                                                          TODAY'S DATE:  5/29/2019    SURGERY DATE:  6/10/2019                                  SURGERY ARRIVAL TIME:   0530    1. Do NOT eat or drink anything, including candy or gum, after MIDNIGHT on 06/09 , unless you have specific instructions from your Surgeon or Anesthesia Provider to do so. 2. No smoking on the day of surgery. 3. No alcohol 24 hours prior to the day of surgery. 4. No recreational drugs for one week prior to the day of surgery. 5. Leave all valuables, including money/purse, at home. 6. Remove all jewelry, nail polish, makeup (including mascara); no lotions, powders, deodorant, or perfume/cologne/after shave. 7. Glasses/Contact lenses and Dentures may be worn to the hospital.  They will be removed prior to surgery. 8. Call your doctor if symptoms of a cold or illness develop within 24 ours prior to surgery. 9. AN ADULT MUST DRIVE YOU HOME AFTER OUTPATIENT SURGERY. 10. If you are having an OUTPATIENT procedure, please make arrangements for a responsible adult to be with you for 24 hours after your surgery. 11. If you are admitted to the hospital, you will be assigned to a bed after surgery is complete. Normally a family member will not be able to see you until you are in your assigned bed. 15. Family is encouraged to accompany you to the hospital.  We ask visitors in the treatment area to be limited to ONE person at a time to ensure patient privacy. EXCEPTIONS WILL BE MADE AS NEEDED. 15. Children under 12 are discouraged from entering the treatment area and need to be supervised by an adult when in the waiting room. Special Instructions: Take these medications the morning of surgery with a sip of water:  NONE    Patient Prep:    use CHG solution    These surgical instructions were reviewed with Carley Toro during the PAT visit.   A printed copy of the instructions was provided to [facility]. Directions: On the morning of surgery, please go to the [address]. Enter the building from the [facility] entrance, 1st floor (next to the Emergency Room entrance). Take the elevator to the 2nd floor. Sign in at the Registration Desk.     If you have any questions and/or concerns, please do not hesitate to call:  (Prior to the day of surgery)  [facility] unit:  [phone]  (Day of surgery)  [facility] unit:  [phone]

## 2019-05-30 ENCOUNTER — HOSPITAL ENCOUNTER (OUTPATIENT)
Dept: GENERAL RADIOLOGY | Age: 74
Discharge: HOME | End: 2019-05-30
Payer: MEDICARE

## 2019-05-30 ENCOUNTER — TELEPHONE (OUTPATIENT)
Dept: SURGERY | Age: 74
End: 2019-05-30

## 2019-05-30 PROCEDURE — 71046 X-RAY EXAM CHEST 2 VIEWS: CPT

## 2019-05-30 NOTE — TELEPHONE ENCOUNTER
Patients daughter called and stated that her mother will be having knee replacement surgery on 06/10/19 and that they wanted to call and make you aware incase there is anything that she needs to do preoperatively. Thank you!

## 2019-05-31 ENCOUNTER — TELEPHONE (OUTPATIENT)
Dept: SURGERY | Age: 74
End: 2019-05-31

## 2019-05-31 ENCOUNTER — DOCUMENTATION ONLY (OUTPATIENT)
Dept: BARIATRICS/WEIGHT MGMT | Age: 74
End: 2019-05-31

## 2019-05-31 NOTE — TELEPHONE ENCOUNTER
Called pt to review post surgical dietary instructions for upcoming other sx. No answer, message left. Timoteo Rivera, [facility]-BC               Bunny Thomas RD  You; Prabhu Mckeon LPN 17 hours ago (3:81 PM)      I agree. Lavern Mckeon typically tell them meat and vegetables, as well. Is she able to do some MR during the healing of surgery?   Please let me know if you have additional questions. Thanks,   Wm. Geri Blackmon Jr., LPN 22 hours ago (74:21 AM)      Thank you!! Routing comment       You  Bunny Thomas RD 22 hours ago (11:02 AM)      Pre op and Post op diet advise? I usually tell them a regular protein rich diet focus on meats and veggie. Routing comment       Prabhu Mckeon LPN routed conversation to You 23 hours ago (10:21 AM)      Prabhu Mckeon LPN 23 hours ago (44:15 AM)         Patients daughter called and stated that her mother will be having knee replacement surgery on 06/10/19 and that they wanted to call and make you aware incase there is anything that she needs to do preoperatively. Thank you!           Documentation

## 2019-05-31 NOTE — PROGRESS NOTES
5/31/19: I have contacted patient and have reviewed her post surgical dietary instructions. Patient was instructed to follow meat and vegetables. Meal choices were reviewed. Daughter stated she is currently following these instructions.     Francisca Jacinto MS RD

## 2019-06-04 ENCOUNTER — HOSPITAL ENCOUNTER (OUTPATIENT)
Dept: PHYSICAL THERAPY | Age: 74
Discharge: HOME | End: 2019-06-04
Payer: MEDICARE

## 2019-06-04 PROCEDURE — 97162 PT EVAL MOD COMPLEX 30 MIN: CPT

## 2019-06-04 PROCEDURE — 97110 THERAPEUTIC EXERCISES: CPT

## 2019-06-04 NOTE — PROGRESS NOTES
[facility]  [address] [facility] [address]  [place], [address]  [phone] fax    Plan of Care/ Statement of Necessity for Physical Therapy Services    Patient name: Tori Ashley Start of Care: 2019   Referral source: Black Odom MD : 1945    Medical Diagnosis: Pain in right knee [M25.561]  [place]or: [place] MED[place] / Plan: [place] MEDICARE PART A & B / Product Type: Medicare /  Larri Curet has worsened in the last year    Treatment Diagnosis: right knee pain   Prior Hospitalization: see medical history Provider#: [id number]   Medications: Verified on Patient summary List    Comorbidities: Arthritis, Back pain, BMI over 30, DM, HTN, kidney/bladder/urination problems, Prior Surgery- kidney removed, shoulder surgery, hysterectomy, . Prior Level of Function: Patient has been utilizing a [facility] for the last 3 years, and is able to do light household management. The Plan of Care and following information is based on the information from the initial evaluation. Assessment/ key information: Patient presents with right knee pain that has been on going for many years, but has become progressively worse over the last year. Patient denied any injury. Patient describes right knee pain as constant aching across anterior knee, that increases with navigating stairs (utilizes a step-to gait), ambulating, and standing greater then 10 minutes. Patient stated that occasionally her right knee gives out, but denies any falls. Patient exhibits decreased right knee AROM (10-80deg), decreased knee and hip strength, and an antalgic gait with use of a SPC. Patient stated she is scheduled to have a TKR done on . Patient was given a home exercise program that emphasized quad/hip activation and right knee ROM  Plan to discharge from PT services at this time as patient will focus on home exercise program to prepare for surgery on . Evaluation Complexity History MEDIUM  Complexity : 1-2 comorbidities / personal factors will impact the outcome/ POC ; Examination MEDIUM Complexity : 3 Standardized tests and measures addressing body structure, function, activity limitation and / or participation in recreation  ;Presentation MEDIUM Complexity : Evolving with changing characteristics  ; Clinical Decision Making MEDIUM Complexity : FOTO score of 26-74  Overall Complexity Rating: MEDIUM  Problem List: pain affecting function, decrease ROM, decrease strength, impaired gait/ balance, decrease ADL/ functional abilitiies, decrease activity tolerance, decrease flexibility/ joint mobility and decrease transfer abilities   Treatment Plan may include any combination of the following: Therapeutic exercise, Therapeutic activities, Neuromuscular re-education, Physical agent/modality, Gait/balance training, Manual therapy, Patient education, Functional mobility training and Stair training  Patient / Family readiness to learn indicated by: asking questions, trying to perform skills and interest  Persons(s) to be included in education: patient (P)  Barriers to Learning/Limitations: None  Patient Goal (s): less pain  Patient Self Reported Health Status: good  Rehabilitation Potential: good    Short Term Goals: To be accomplished in 1 treatments:   1. Patient will be able to perform and verbalize understanding of HEP to be able to continue progress at home in preparation for upcoming TKR. Eval: HEP established   Current: Goal MET as patient demonstrated HEP and reported no questions or concerns. Frequency / Duration: Patient to be seen 1 times per week for 1 treatments.     Patient/ Caregiver education and instruction: Diagnosis, prognosis, exercises   [x]  Plan of care has been reviewed with PTA    Certification Period: 06/04/19- 07/03/19   Adriana Stauffer, PT 6/4/2019 9:34 AM    ________________________________________________________________________    I certify that the above Therapy Services are being furnished while the patient is under my care. I agree with the treatment plan and certify that this therapy is necessary.     [de-identified] Signature:____________________  Date:____________Time: _________    Please sign and return to [facility]  [address] [place] [address], [address]  [phone] fax

## 2019-06-04 NOTE — PROGRESS NOTES
PT DAILY TREATMENT NOTE 10-18    Patient Name: Rachana Moon  Date:2019  : 1945  [x]  Patient  Verified  Payor: [place] MED[place]ARE / Plan: [place] MEDICARE PART A & B / Product Type: Medicare /    In time: 9:35  Out time:10:20  Total Treatment Time (min): 45  Visit #: 1 of 1    Medicare/BCBS Only   Total Timed Codes (min):  23 1:1 Treatment Time:  22       Treatment Area: Pain in right knee [M25.561]    SUBJECTIVE  Pain Level (0-10 scale): 6/10   Any medication changes, allergies to medications, adverse drug reactions, diagnosis change, or new procedure performed?: [x] No    [] Yes (see summary sheet for update)  Subjective functional status/changes:   [] No changes reported  See POC    OBJECTIVE    22 min [x]Eval                  []Re-Eval       23 min Therapeutic Exercise:  [x] See flow sheet : HEP creation and review    Rationale: increase ROM and increase strength to improve the patients ability to perform ADLs. With   [x] TE   [] TA   [] neuro   [] other: Patient Education: [x] Review HEP    [] Progressed/Changed HEP based on:   [] positioning   [] body mechanics   [] transfers   [] heat/ice application    [x] other: Educated patient on importance of improving right knee ROM and quad/hip muscle activation to increase ease with post-op recovery. Patient was in agreement that she wanted to just do the HEP as opposed to coming back in for outpatient PT before the surgery. Other Objective/Functional Measures: See POC     Pain Level (0-10 scale) post treatment: 6/10     ASSESSMENT/Changes in Function: See POC. Patient reported no change in pain at end of the session. Advised patient to perform HEP within her tolerance and avoid increased pain.      [x]  See Plan of Care  []  See progress note/recertification  []  See Discharge Summary         Progress towards goals / Updated goals:  See POC    PLAN  []  Upgrade activities as tolerated     []  Continue plan of care  []  Update interventions per flow sheet       [x]  Discharge due to:_Patient was given a HEP to prepare for upcoming TKR surgery on Tesha 10, 2019.    []  Other:_      Alexy Oliveira, PT 6/4/2019  1:26 PM    Future Appointments   Date Time Provider Shubham Wayne   6/19/2019  9:00 AM Marbella Reddy NP [address] [facility]

## 2019-06-07 ENCOUNTER — ANESTHESIA EVENT (OUTPATIENT)
Dept: SURGERY | Age: 74
DRG: 470 | End: 2019-06-07
Payer: MEDICARE

## 2019-06-10 ENCOUNTER — HOSPITAL ENCOUNTER (INPATIENT)
Age: 74
LOS: 3 days | Discharge: NURSING, REHAB OR LONG-TERM CARE | DRG: 470 | End: 2019-06-13
Attending: ORTHOPAEDIC SURGERY | Admitting: ORTHOPAEDIC SURGERY
Payer: MEDICARE

## 2019-06-10 ENCOUNTER — ANESTHESIA (OUTPATIENT)
Dept: SURGERY | Age: 74
DRG: 470 | End: 2019-06-10
Payer: MEDICARE

## 2019-06-10 DIAGNOSIS — M17.11 PRIMARY OSTEOARTHRITIS OF RIGHT KNEE: Primary | ICD-10-CM

## 2019-06-10 PROBLEM — M17.9 OSTEOARTHRITIS OF KNEE: Status: ACTIVE | Noted: 2019-06-10

## 2019-06-10 LAB
GLUCOSE BLD STRIP.AUTO-MCNC: 106 MG/DL (ref 70–110)
GLUCOSE BLD STRIP.AUTO-MCNC: 129 MG/DL (ref 70–110)
HCT VFR BLD AUTO: 37.2 % (ref 35–45)
HGB BLD-MCNC: 11.6 G/DL (ref 12–16)

## 2019-06-10 PROCEDURE — 65270000029 HC RM PRIVATE

## 2019-06-10 PROCEDURE — 74011250636 HC RX REV CODE- 250/636: Performed by: NURSE ANESTHETIST, CERTIFIED REGISTERED

## 2019-06-10 PROCEDURE — 3E0T3BZ INTRODUCTION OF ANESTHETIC AGENT INTO PERIPHERAL NERVES AND PLEXI, PERCUTANEOUS APPROACH: ICD-10-PCS | Performed by: ANESTHESIOLOGY

## 2019-06-10 PROCEDURE — 77030002933 HC SUT MCRYL J&J -A: Performed by: ORTHOPAEDIC SURGERY

## 2019-06-10 PROCEDURE — 82962 GLUCOSE BLOOD TEST: CPT

## 2019-06-10 PROCEDURE — 74011250636 HC RX REV CODE- 250/636

## 2019-06-10 PROCEDURE — 77030034479 HC ADH SKN CLSR PRINEO J&J -B: Performed by: ORTHOPAEDIC SURGERY

## 2019-06-10 PROCEDURE — 77030006835 HC BLD SAW SAG STRY -B: Performed by: ORTHOPAEDIC SURGERY

## 2019-06-10 PROCEDURE — C1713 ANCHOR/SCREW BN/BN,TIS/BN: HCPCS | Performed by: ORTHOPAEDIC SURGERY

## 2019-06-10 PROCEDURE — 74011000250 HC RX REV CODE- 250: Performed by: ORTHOPAEDIC SURGERY

## 2019-06-10 PROCEDURE — 77030006822 HC BLD SAW SAG BRSM -B: Performed by: ORTHOPAEDIC SURGERY

## 2019-06-10 PROCEDURE — 74011000258 HC RX REV CODE- 258: Performed by: ORTHOPAEDIC SURGERY

## 2019-06-10 PROCEDURE — 76010000132 HC OR TIME 2.5 TO 3 HR: Performed by: ORTHOPAEDIC SURGERY

## 2019-06-10 PROCEDURE — 74011000272 HC RX REV CODE- 272: Performed by: ORTHOPAEDIC SURGERY

## 2019-06-10 PROCEDURE — 74011250637 HC RX REV CODE- 250/637: Performed by: NURSE ANESTHETIST, CERTIFIED REGISTERED

## 2019-06-10 PROCEDURE — 77030031139 HC SUT VCRL2 J&J -A: Performed by: ORTHOPAEDIC SURGERY

## 2019-06-10 PROCEDURE — L1830 KO IMMOB CANVAS LONG PRE OTS: HCPCS | Performed by: ORTHOPAEDIC SURGERY

## 2019-06-10 PROCEDURE — 85018 HEMOGLOBIN: CPT

## 2019-06-10 PROCEDURE — 77030003028 HC SUT VCRL J&J -A: Performed by: ORTHOPAEDIC SURGERY

## 2019-06-10 PROCEDURE — 64450 NJX AA&/STRD OTHER PN/BRANCH: CPT | Performed by: ANESTHESIOLOGY

## 2019-06-10 PROCEDURE — 0SRC0J9 REPLACEMENT OF RIGHT KNEE JOINT WITH SYNTHETIC SUBSTITUTE, CEMENTED, OPEN APPROACH: ICD-10-PCS | Performed by: ORTHOPAEDIC SURGERY

## 2019-06-10 PROCEDURE — 77030032490 HC SLV COMPR SCD KNE COVD -B: Performed by: ORTHOPAEDIC SURGERY

## 2019-06-10 PROCEDURE — 77030008683 HC TU ET CUF COVD -A: Performed by: ANESTHESIOLOGY

## 2019-06-10 PROCEDURE — 77030033138 HC SUT PGA STRATFX J&J -B: Performed by: ORTHOPAEDIC SURGERY

## 2019-06-10 PROCEDURE — 76060000036 HC ANESTHESIA 2.5 TO 3 HR: Performed by: ORTHOPAEDIC SURGERY

## 2019-06-10 PROCEDURE — 77030035236 HC SUT PDS STRATFX BARB J&J -B: Performed by: ORTHOPAEDIC SURGERY

## 2019-06-10 PROCEDURE — 77030018673: Performed by: ORTHOPAEDIC SURGERY

## 2019-06-10 PROCEDURE — 76210000016 HC OR PH I REC 1 TO 1.5 HR: Performed by: ORTHOPAEDIC SURGERY

## 2019-06-10 PROCEDURE — 77030013079 HC BLNKT BAIR HGGR 3M -A: Performed by: ANESTHESIOLOGY

## 2019-06-10 PROCEDURE — 77030013708 HC HNDPC SUC IRR PULS STRY –B: Performed by: ORTHOPAEDIC SURGERY

## 2019-06-10 PROCEDURE — 77030008477 HC STYL SATN SLP COVD -A: Performed by: ANESTHESIOLOGY

## 2019-06-10 PROCEDURE — 74011000250 HC RX REV CODE- 250

## 2019-06-10 PROCEDURE — 76942 ECHO GUIDE FOR BIOPSY: CPT | Performed by: ANESTHESIOLOGY

## 2019-06-10 PROCEDURE — 77030018836 HC SOL IRR NACL ICUM -A: Performed by: ORTHOPAEDIC SURGERY

## 2019-06-10 PROCEDURE — 77010033678 HC OXYGEN DAILY

## 2019-06-10 PROCEDURE — 74011250636 HC RX REV CODE- 250/636: Performed by: ORTHOPAEDIC SURGERY

## 2019-06-10 PROCEDURE — 77030034075 HC SYR ASPIR ACDA INCL EXAC -G: Performed by: ORTHOPAEDIC SURGERY

## 2019-06-10 PROCEDURE — 77030008462 HC STPLR SKN PROX J&J -A: Performed by: ORTHOPAEDIC SURGERY

## 2019-06-10 PROCEDURE — C1776 JOINT DEVICE (IMPLANTABLE): HCPCS | Performed by: ORTHOPAEDIC SURGERY

## 2019-06-10 PROCEDURE — 77030020753 HC CUF TRNQT 1BLA STRY -B: Performed by: ORTHOPAEDIC SURGERY

## 2019-06-10 PROCEDURE — 77030027138 HC INCENT SPIROMETER -A

## 2019-06-10 PROCEDURE — 36415 COLL VENOUS BLD VENIPUNCTURE: CPT

## 2019-06-10 PROCEDURE — 74011250637 HC RX REV CODE- 250/637: Performed by: ORTHOPAEDIC SURGERY

## 2019-06-10 DEVICE — CEMENT BONE 70GM FULL DOSE CA PHOS W/ GENTMYCN HI VISC N: Type: IMPLANTABLE DEVICE | Site: KNEE | Status: FUNCTIONAL

## 2019-06-10 DEVICE — IMPLANTABLE DEVICE
Type: IMPLANTABLE DEVICE | Site: KNEE | Status: FUNCTIONAL
Brand: TRULIANT

## 2019-06-10 DEVICE — IMPLANTABLE DEVICE
Type: IMPLANTABLE DEVICE | Site: KNEE | Status: FUNCTIONAL
Brand: OPTETRAK

## 2019-06-10 DEVICE — IMPLANTABLE DEVICE: Type: IMPLANTABLE DEVICE | Site: KNEE | Status: FUNCTIONAL

## 2019-06-10 RX ORDER — ONDANSETRON 2 MG/ML
INJECTION INTRAMUSCULAR; INTRAVENOUS AS NEEDED
Status: DISCONTINUED | OUTPATIENT
Start: 2019-06-10 | End: 2019-06-10 | Stop reason: HOSPADM

## 2019-06-10 RX ORDER — ONDANSETRON 2 MG/ML
4 INJECTION INTRAMUSCULAR; INTRAVENOUS
Status: DISCONTINUED | OUTPATIENT
Start: 2019-06-10 | End: 2019-06-13 | Stop reason: HOSPADM

## 2019-06-10 RX ORDER — INSULIN LISPRO 100 [IU]/ML
INJECTION, SOLUTION INTRAVENOUS; SUBCUTANEOUS ONCE
Status: DISCONTINUED | OUTPATIENT
Start: 2019-06-10 | End: 2019-06-10 | Stop reason: HOSPADM

## 2019-06-10 RX ORDER — MAGNESIUM SULFATE 100 %
4 CRYSTALS MISCELLANEOUS AS NEEDED
Status: DISCONTINUED | OUTPATIENT
Start: 2019-06-10 | End: 2019-06-10 | Stop reason: HOSPADM

## 2019-06-10 RX ORDER — ENOXAPARIN SODIUM 100 MG/ML
30 INJECTION SUBCUTANEOUS EVERY 12 HOURS
Status: DISCONTINUED | OUTPATIENT
Start: 2019-06-10 | End: 2019-06-13 | Stop reason: HOSPADM

## 2019-06-10 RX ORDER — CEFAZOLIN SODIUM 2 G/50ML
2 SOLUTION INTRAVENOUS
Status: DISCONTINUED | OUTPATIENT
Start: 2019-06-10 | End: 2019-06-10 | Stop reason: HOSPADM

## 2019-06-10 RX ORDER — LIDOCAINE HYDROCHLORIDE 20 MG/ML
INJECTION, SOLUTION EPIDURAL; INFILTRATION; INTRACAUDAL; PERINEURAL AS NEEDED
Status: DISCONTINUED | OUTPATIENT
Start: 2019-06-10 | End: 2019-06-10 | Stop reason: HOSPADM

## 2019-06-10 RX ORDER — HYDROMORPHONE HYDROCHLORIDE 2 MG/ML
0.5 INJECTION, SOLUTION INTRAMUSCULAR; INTRAVENOUS; SUBCUTANEOUS
Status: DISCONTINUED | OUTPATIENT
Start: 2019-06-10 | End: 2019-06-10 | Stop reason: HOSPADM

## 2019-06-10 RX ORDER — SODIUM CHLORIDE 0.9 % (FLUSH) 0.9 %
5-40 SYRINGE (ML) INJECTION AS NEEDED
Status: DISCONTINUED | OUTPATIENT
Start: 2019-06-10 | End: 2019-06-10 | Stop reason: HOSPADM

## 2019-06-10 RX ORDER — HYDROMORPHONE HYDROCHLORIDE 1 MG/ML
INJECTION, SOLUTION INTRAMUSCULAR; INTRAVENOUS; SUBCUTANEOUS AS NEEDED
Status: DISCONTINUED | OUTPATIENT
Start: 2019-06-10 | End: 2019-06-10 | Stop reason: HOSPADM

## 2019-06-10 RX ORDER — DEXAMETHASONE SODIUM PHOSPHATE 4 MG/ML
INJECTION, SOLUTION INTRA-ARTICULAR; INTRALESIONAL; INTRAMUSCULAR; INTRAVENOUS; SOFT TISSUE
Status: COMPLETED | OUTPATIENT
Start: 2019-06-10 | End: 2019-06-10

## 2019-06-10 RX ORDER — FAMOTIDINE 20 MG/1
20 TABLET, FILM COATED ORAL ONCE
Status: COMPLETED | OUTPATIENT
Start: 2019-06-10 | End: 2019-06-10

## 2019-06-10 RX ORDER — FENTANYL CITRATE 50 UG/ML
100 INJECTION, SOLUTION INTRAMUSCULAR; INTRAVENOUS ONCE
Status: DISCONTINUED | OUTPATIENT
Start: 2019-06-10 | End: 2019-06-10 | Stop reason: HOSPADM

## 2019-06-10 RX ORDER — ROPIVACAINE HYDROCHLORIDE 5 MG/ML
INJECTION, SOLUTION EPIDURAL; INFILTRATION; PERINEURAL
Status: DISCONTINUED | OUTPATIENT
Start: 2019-06-10 | End: 2019-06-10 | Stop reason: HOSPADM

## 2019-06-10 RX ORDER — NALOXONE HYDROCHLORIDE 0.4 MG/ML
0.4 INJECTION, SOLUTION INTRAMUSCULAR; INTRAVENOUS; SUBCUTANEOUS AS NEEDED
Status: DISCONTINUED | OUTPATIENT
Start: 2019-06-10 | End: 2019-06-13 | Stop reason: HOSPADM

## 2019-06-10 RX ORDER — ROPIVACAINE HYDROCHLORIDE 2 MG/ML
INJECTION, SOLUTION EPIDURAL; INFILTRATION; PERINEURAL
Status: COMPLETED | OUTPATIENT
Start: 2019-06-10 | End: 2019-06-10

## 2019-06-10 RX ORDER — FACIAL-BODY WIPES
10 EACH TOPICAL DAILY PRN
Status: DISCONTINUED | OUTPATIENT
Start: 2019-06-10 | End: 2019-06-13 | Stop reason: HOSPADM

## 2019-06-10 RX ORDER — FENTANYL CITRATE 50 UG/ML
INJECTION, SOLUTION INTRAMUSCULAR; INTRAVENOUS AS NEEDED
Status: DISCONTINUED | OUTPATIENT
Start: 2019-06-10 | End: 2019-06-10 | Stop reason: HOSPADM

## 2019-06-10 RX ORDER — SUCCINYLCHOLINE CHLORIDE 20 MG/ML
INJECTION INTRAMUSCULAR; INTRAVENOUS AS NEEDED
Status: DISCONTINUED | OUTPATIENT
Start: 2019-06-10 | End: 2019-06-10 | Stop reason: HOSPADM

## 2019-06-10 RX ORDER — FENTANYL CITRATE 50 UG/ML
INJECTION, SOLUTION INTRAMUSCULAR; INTRAVENOUS
Status: COMPLETED
Start: 2019-06-10 | End: 2019-06-10

## 2019-06-10 RX ORDER — OXYCODONE AND ACETAMINOPHEN 5; 325 MG/1; MG/1
2 TABLET ORAL
Status: DISCONTINUED | OUTPATIENT
Start: 2019-06-10 | End: 2019-06-13 | Stop reason: HOSPADM

## 2019-06-10 RX ORDER — ONDANSETRON 2 MG/ML
4 INJECTION INTRAMUSCULAR; INTRAVENOUS ONCE
Status: COMPLETED | OUTPATIENT
Start: 2019-06-10 | End: 2019-06-10

## 2019-06-10 RX ORDER — PROPOFOL 10 MG/ML
INJECTION, EMULSION INTRAVENOUS AS NEEDED
Status: DISCONTINUED | OUTPATIENT
Start: 2019-06-10 | End: 2019-06-10 | Stop reason: HOSPADM

## 2019-06-10 RX ORDER — MIDAZOLAM HYDROCHLORIDE 1 MG/ML
2 INJECTION, SOLUTION INTRAMUSCULAR; INTRAVENOUS ONCE
Status: COMPLETED | OUTPATIENT
Start: 2019-06-10 | End: 2019-06-10

## 2019-06-10 RX ORDER — SODIUM CHLORIDE 0.9 % (FLUSH) 0.9 %
5-40 SYRINGE (ML) INJECTION AS NEEDED
Status: DISCONTINUED | OUTPATIENT
Start: 2019-06-10 | End: 2019-06-13 | Stop reason: HOSPADM

## 2019-06-10 RX ORDER — SODIUM CHLORIDE 0.9 % (FLUSH) 0.9 %
5-40 SYRINGE (ML) INJECTION EVERY 8 HOURS
Status: DISCONTINUED | OUTPATIENT
Start: 2019-06-10 | End: 2019-06-13 | Stop reason: HOSPADM

## 2019-06-10 RX ORDER — KETOROLAC TROMETHAMINE 30 MG/ML
15 INJECTION, SOLUTION INTRAMUSCULAR; INTRAVENOUS
Status: ACTIVE | OUTPATIENT
Start: 2019-06-10 | End: 2019-06-11

## 2019-06-10 RX ORDER — SODIUM CHLORIDE, SODIUM LACTATE, POTASSIUM CHLORIDE, CALCIUM CHLORIDE 600; 310; 30; 20 MG/100ML; MG/100ML; MG/100ML; MG/100ML
50 INJECTION, SOLUTION INTRAVENOUS CONTINUOUS
Status: DISPENSED | OUTPATIENT
Start: 2019-06-10 | End: 2019-06-11

## 2019-06-10 RX ORDER — MIDAZOLAM HYDROCHLORIDE 1 MG/ML
INJECTION, SOLUTION INTRAMUSCULAR; INTRAVENOUS AS NEEDED
Status: DISCONTINUED | OUTPATIENT
Start: 2019-06-10 | End: 2019-06-10 | Stop reason: HOSPADM

## 2019-06-10 RX ORDER — SODIUM CHLORIDE, SODIUM LACTATE, POTASSIUM CHLORIDE, CALCIUM CHLORIDE 600; 310; 30; 20 MG/100ML; MG/100ML; MG/100ML; MG/100ML
125 INJECTION, SOLUTION INTRAVENOUS CONTINUOUS
Status: DISCONTINUED | OUTPATIENT
Start: 2019-06-10 | End: 2019-06-10 | Stop reason: HOSPADM

## 2019-06-10 RX ORDER — VECURONIUM BROMIDE FOR INJECTION 1 MG/ML
INJECTION, POWDER, LYOPHILIZED, FOR SOLUTION INTRAVENOUS AS NEEDED
Status: DISCONTINUED | OUTPATIENT
Start: 2019-06-10 | End: 2019-06-10 | Stop reason: HOSPADM

## 2019-06-10 RX ORDER — HYDROCODONE BITARTRATE AND ACETAMINOPHEN 10; 325 MG/1; MG/1
2 TABLET ORAL
Status: DISCONTINUED | OUTPATIENT
Start: 2019-06-10 | End: 2019-06-13 | Stop reason: HOSPADM

## 2019-06-10 RX ORDER — CEFAZOLIN SODIUM 2 G/50ML
2 SOLUTION INTRAVENOUS EVERY 8 HOURS
Status: COMPLETED | OUTPATIENT
Start: 2019-06-10 | End: 2019-06-11

## 2019-06-10 RX ORDER — MORPHINE SULFATE 2 MG/ML
2 INJECTION, SOLUTION INTRAMUSCULAR; INTRAVENOUS
Status: DISCONTINUED | OUTPATIENT
Start: 2019-06-10 | End: 2019-06-13 | Stop reason: HOSPADM

## 2019-06-10 RX ORDER — FENTANYL CITRATE 50 UG/ML
50 INJECTION, SOLUTION INTRAMUSCULAR; INTRAVENOUS AS NEEDED
Status: DISCONTINUED | OUTPATIENT
Start: 2019-06-10 | End: 2019-06-10 | Stop reason: HOSPADM

## 2019-06-10 RX ORDER — MIDAZOLAM HYDROCHLORIDE 1 MG/ML
INJECTION, SOLUTION INTRAMUSCULAR; INTRAVENOUS
Status: DISPENSED
Start: 2019-06-10 | End: 2019-06-10

## 2019-06-10 RX ORDER — SODIUM CHLORIDE 0.9 % (FLUSH) 0.9 %
5-40 SYRINGE (ML) INJECTION EVERY 8 HOURS
Status: DISCONTINUED | OUTPATIENT
Start: 2019-06-10 | End: 2019-06-10 | Stop reason: HOSPADM

## 2019-06-10 RX ADMIN — LIDOCAINE HYDROCHLORIDE 50 MG: 20 INJECTION, SOLUTION EPIDURAL; INFILTRATION; INTRACAUDAL; PERINEURAL at 07:42

## 2019-06-10 RX ADMIN — SUCCINYLCHOLINE CHLORIDE 100 MG: 20 INJECTION INTRAMUSCULAR; INTRAVENOUS at 07:42

## 2019-06-10 RX ADMIN — FENTANYL CITRATE 50 MCG: 50 INJECTION, SOLUTION INTRAMUSCULAR; INTRAVENOUS at 10:25

## 2019-06-10 RX ADMIN — OXYCODONE HYDROCHLORIDE AND ACETAMINOPHEN 2 TABLET: 5; 325 TABLET ORAL at 21:46

## 2019-06-10 RX ADMIN — MIDAZOLAM HYDROCHLORIDE 2 MG: 1 INJECTION, SOLUTION INTRAMUSCULAR; INTRAVENOUS at 07:27

## 2019-06-10 RX ADMIN — Medication 10 ML: at 21:46

## 2019-06-10 RX ADMIN — ONDANSETRON 4 MG: 2 INJECTION INTRAMUSCULAR; INTRAVENOUS at 08:11

## 2019-06-10 RX ADMIN — FENTANYL CITRATE 50 MCG: 50 INJECTION, SOLUTION INTRAMUSCULAR; INTRAVENOUS at 09:58

## 2019-06-10 RX ADMIN — ENOXAPARIN SODIUM 30 MG: 100 INJECTION SUBCUTANEOUS at 13:33

## 2019-06-10 RX ADMIN — SODIUM CHLORIDE, SODIUM LACTATE, POTASSIUM CHLORIDE, AND CALCIUM CHLORIDE 50 ML/HR: 600; 310; 30; 20 INJECTION, SOLUTION INTRAVENOUS at 13:13

## 2019-06-10 RX ADMIN — HYDROMORPHONE HYDROCHLORIDE 1 MG: 1 INJECTION, SOLUTION INTRAMUSCULAR; INTRAVENOUS; SUBCUTANEOUS at 08:32

## 2019-06-10 RX ADMIN — MIDAZOLAM HYDROCHLORIDE 2 MG: 1 INJECTION, SOLUTION INTRAMUSCULAR; INTRAVENOUS at 07:37

## 2019-06-10 RX ADMIN — ROPIVACAINE HYDROCHLORIDE 60 MG: 2 INJECTION, SOLUTION EPIDURAL; INFILTRATION; PERINEURAL at 07:29

## 2019-06-10 RX ADMIN — DEXAMETHASONE SODIUM PHOSPHATE 10 MG: 4 INJECTION, SOLUTION INTRA-ARTICULAR; INTRALESIONAL; INTRAMUSCULAR; INTRAVENOUS; SOFT TISSUE at 07:29

## 2019-06-10 RX ADMIN — ONDANSETRON 4 MG: 2 INJECTION INTRAMUSCULAR; INTRAVENOUS at 10:25

## 2019-06-10 RX ADMIN — ROPIVACAINE HYDROCHLORIDE 15 ML: 5 INJECTION, SOLUTION EPIDURAL; INFILTRATION; PERINEURAL at 10:44

## 2019-06-10 RX ADMIN — CEFAZOLIN 2 G: 10 INJECTION, POWDER, FOR SOLUTION INTRAVENOUS at 15:38

## 2019-06-10 RX ADMIN — FENTANYL CITRATE 50 MCG: 50 INJECTION, SOLUTION INTRAMUSCULAR; INTRAVENOUS at 08:53

## 2019-06-10 RX ADMIN — Medication 5 ML: at 13:34

## 2019-06-10 RX ADMIN — VECURONIUM BROMIDE FOR INJECTION 1 MG: 1 INJECTION, POWDER, LYOPHILIZED, FOR SOLUTION INTRAVENOUS at 07:42

## 2019-06-10 RX ADMIN — PROPOFOL 150 MG: 10 INJECTION, EMULSION INTRAVENOUS at 07:42

## 2019-06-10 RX ADMIN — SODIUM CHLORIDE, SODIUM LACTATE, POTASSIUM CHLORIDE, AND CALCIUM CHLORIDE 50 ML/HR: 600; 310; 30; 20 INJECTION, SOLUTION INTRAVENOUS at 06:58

## 2019-06-10 RX ADMIN — FAMOTIDINE 20 MG: 20 TABLET, FILM COATED ORAL at 06:41

## 2019-06-10 RX ADMIN — FENTANYL CITRATE 100 MCG: 50 INJECTION, SOLUTION INTRAMUSCULAR; INTRAVENOUS at 07:42

## 2019-06-10 NOTE — PERIOP NOTES
Pre-Op Summary    Pt arrived via car with family/friend and is oriented to time, place, person and situation. Patient with unsteady gait with cane assistive devices. Visit Vitals  /69   Pulse 65   Temp 98.2 °F (36.8 °C)   Resp 16   Ht 4' 9\" (1.448 m)   Wt 88.6 kg (195 lb 7 oz)   SpO2 98%   BMI 42.29 kg/m²       Peripheral IV located on Left antecubital .    Patients belongings are located given to daughter. Patient's point of contact is Daphne Fermin, daughter and their contact number is: [phone]. They will be in the waiting room. They are able to receive medication information. They will be admitted.

## 2019-06-10 NOTE — PERIOP NOTES
1018 Pt received to PACU and connected to monitor. Bedside report given by Marly Avila RN. Vital signs stable. Nurse at bedside. Will continue to monitor. 1023  No coverage needed  Lab Results   Component Value Date/Time    Glucose 93 05/29/2019 08:56 AM    Glucose (POC) 129 (H) 06/10/2019 10:23 AM     1033 TIME-OUT FOR POST OP PAIN BLOCK     1044  Procedure performed by:  Dr. Vida Garibay     Provider assisted by: [facility]    How tolerated by patient: tolerated the procedure well with no complications    Post Procedural Pain Scale: 2 - Hurts Little Bit    Comments: Pt visual pain score of 0. Drifting off to sleep during conversation. _____________________________________________________________________________________________________    9803 Hospitalist at bedside for consult. 1110 Called to update pt's family on current status and room assignment. 1125 TRANSFER - OUT REPORT:    Verbal report given to Domingo Jackson on Kita Durbinumb  being transferred to Room 2220 (unit) for routine post - op       Report consisted of patients Situation, Background, Assessment and   Recommendations(SBAR). Information from the following report(s) SBAR and Kardex was reviewed with the receiving nurse. Lines:   Peripheral IV 06/10/19 Left Antecubital (Active)   Site Assessment Clean, dry, & intact 6/10/2019 10:18 AM   Phlebitis Assessment 0 6/10/2019 10:18 AM   Infiltration Assessment 0 6/10/2019 10:18 AM   Dressing Status Clean, dry, & intact 6/10/2019 10:18 AM   Dressing Type Transparent;Tape 6/10/2019 10:18 AM   Hub Color/Line Status Infusing;Pink 6/10/2019 10:18 AM   Action Taken Open ports on tubing capped 6/10/2019 10:18 AM   Alcohol Cap Used Yes 6/10/2019 10:18 AM        Opportunity for questions and clarification was provided.       Patient transported with:   [employer]

## 2019-06-10 NOTE — PROGRESS NOTES
1120- Assessing chart to review plan of care. HIPAA maintained. Received report from [facility], RN.    1330- Pt resting in bed comfortably. Encourage ROM exercises to RLE to assist any cramping/discomfort. Sensation and pulses present in all extremities, skin warm to touch. Pt denies any numbness or pain to the RLE at this time. Family at bedside. Call bell w/i reach. 1940- Bedside and Verbal shift change report given to DAVID De La Garza (oncoming nurse) by Jessenia Beckford RN (offgoing nurse). Report included the following information SBAR, Kardex and MAR.

## 2019-06-10 NOTE — CONSULTS
Internal Medicine Consult          Consult Requested By: Dr. Matthew Guardado, orthopedic surgery    Subjective     HPI: Silverio Mixon is a 68 y.o. female with a PMHx listed below who we were consulted for medical management while undergoing right total KNEE ARTHROPLASTY with prp with femoral. She tolerated the procedure well but required another nerve block post procedure for pain. This was elective procedure due to osteoarthritis. Other than knee pain, she had no other complaints.     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     Hypertension     Morbid obesity ([facility] [address]     Sleep apnea     CPAP machine    Use of cane as ambulatory aid        PSurgHx:  Past Surgical History:   Procedure Laterality Date    HX CATARACT REMOVAL Bilateral     HX  SECTION          HX COLONOSCOPY      HX HYSTERECTOMY      HX NEPHRECTOMY Right     HX ROTATOR CUFF REPAIR Right     shoulder surgery       SocialHx:  Social History     Socioeconomic History    Marital status: SINGLE     Spouse name: Not on file    Number of children: Not on file    Years of education: Not on file    Highest education level: Not on file   Occupational History    Not on file   Social Needs    Financial resource strain: Not on file    Food insecurity:     Worry: Not on file     Inability: Not on file    Transportation needs:     Medical: Not on file     Non-medical: Not on file   Tobacco Use    Smoking status: Never Smoker    Smokeless tobacco: Never Used   Substance and Sexual Activity    Alcohol use: No    Drug use: No    Sexual activity: Not Currently   Lifestyle    Physical activity:     Days per week: Not on file     Minutes per session: Not on file    Stress: Not on file   Relationships    Social connections:     Talks on phone: Not on file     Gets together: Not on file     Attends Sikh service: Not on file     Active member of club or organization: Not on file     Attends meetings of clubs or organizations: Not on file     Relationship status: Not on file    Intimate partner violence:     Fear of current or ex partner: Not on file     Emotionally abused: Not on file     Physically abused: Not on file     Forced sexual activity: Not on file   Other Topics Concern    Not on file   Social History Narrative    Not on file       FamilyHx:  Family History   Problem Relation Age of Onset    Diabetes Mother     Diabetes Sister        Prior to Admission Medications   Prescriptions Last Dose Informant Patient Reported? Taking?   acetaminophen (TYLENOL) 325 mg tablet 2 wks Self Yes Yes   Sig: Take  by mouth every four (4) hours as needed for Pain. Indications: BACK PAIN, PAIN   calcium-cholecalciferol, d3, (CALCIUM 600 + D) 600-125 mg-unit tab 2019  Yes Yes   Sig: Take  by mouth daily. cholecalciferol (VITAMIN D3) 1,000 unit tablet 2019  Yes Yes   Si,000 Units. diclofenac (VOLTAREN) 1 % gel Unknown at Unknown time  Yes No   Sig: Apply 2 g to affected area as needed. Indications: OSTEOARTHRITIS   latanoprost (XALATAN) 0.005 % ophthalmic solution 2019 at Unknown time  Yes Yes   Sig: Administer 1 Drop to right eye nightly. losartan-hydroCHLOROthiazide (HYZAAR) 50-12.5 mg per tablet 2019 at am  Yes Yes   Sig: Take 1 Tab by mouth daily.       Facility-Administered Medications: None       Review of Systems:  CONST: Fever, weight loss, fatigue or chills  HEENT: Recent changes in vision, vertigo, epistaxis, dysphagia and hoarseness  CV: Chest pain, palpitations, edema and varicosities  RESP: Cough, shortness of breath, wheezing, hemoptysis, snoring and reactive airway disease  GI: Nausea, vomiting, abdominal pain, change in bowel habits, hematochezia, melena, and GERD   : Hematuria, dysuria, frequency, urgency, nocturia and stress urinary incontinence   MS: Weakness, joint pain and arthritis  ENDO: Polyuria, polydipsia, polyphagia, poor wound healing, heat intolerance, cold intolerance  LYMPH/HEME: Anemia, bruising and history of blood transfusions  INTEG: Dermatitis, abnormal moles  NEURO: Dizziness, headache, fainting, seizures and stroke  PSYCH: Anxiety and depression        Objective      Visit Vitals  BP (!) 168/93 (BP 1 Location: Left arm, BP Patient Position: At rest;Head of bed elevated (Comment degrees))   Pulse 86   Temp 98 °F (36.7 °C)   Resp 12   Ht 4' 9\" (1.448 m)   Wt 88.6 kg (195 lb 7 oz)   SpO2 99%   BMI 42.29 kg/m²       Physical Exam:  General Appearance: NAD, conversant  HENT: normocephalic/atraumatic, moist mucus membranes  Lungs: CTA with normal respiratory effort  CV: RRR, no m/r/g  Abdomen: soft, non-tender, normal bowel sounds  Extremities: no cyanosis, no peripheral edema, R knee in brace/dressing  Neuro: moves all extremities, no focal deficits  Psych: appropriate affect, alert and oriented to person, place and time    Laboratory Studies:  BMP: No results found for: NA, K, CL, CO2, AGAP, GLU, BUN, CREA, GFRAA, GFRNA  CBC: No results found for: WBC, HGB, HGBEXT, HCT, HCTEXT, PLT, PLTEXT, HGBEXT, HCTEXT, PLTEXT    Imaging Reviewed:  No results found.       Assessment/Plan     Active Hospital Problems    Diagnosis Date Noted    Osteoarthritis of knee 06/10/2019    Right knee DJD 06/10/2019    Obesity, morbid ([facility] [address] 11/14/2018    Essential hypertension 12/31/2016    Type 2 diabetes mellitus without complication ([facility] [address] 18/26/6689    Obstructive sleep apnea 01/05/2016     Overview:   1/20/2016 AHI/RDI: 6                   REM AHI: 27               SUPINE AHI: 10 SpO2 eli: 81% PLMI: 4      PAP titration Date: 3/15/2016  CPAP11 cm H2O (AHI: 0; SpO2 eli: 93%)  CPAP: 11 cm H2O PLMI: 3    CPAP 11  DME: [facility]       - Diet and mobilization per primary team  - Pain control PRN  - PT/OT  - Clinically monitor BP  - Cont acceptable home medications for chronic conditions - DVT protocol    I reviewed all available labs and imaging that were available prior to my encounter. We appreciate the consultation for medical management and appreciate being able to be involved with their care during hospitalization.       Little Buchanan, DO  Internal Medicine, Hospitalist  Pager: [phone] [facility]

## 2019-06-10 NOTE — ANESTHESIA PROCEDURE NOTES
Right Adductor Canal Block    Start time: 6/10/2019 10:33 AM  End time: 6/10/2019 10:44 AM  Performed by: Alan Rodas MD  Authorized by: Alan Rodas MD       Pre-procedure: Indications: at surgeon's request and post-op pain management    Preanesthetic Checklist: patient identified, risks and benefits discussed, site marked, timeout performed, anesthesia consent given and patient being monitored    Timeout Time: 10:33          Block Type:   Block Type: Adductor canal  Laterality:  Right  Monitoring:  Responsive to questions, standard ASA monitoring, continuous pulse ox, frequent vital sign checks, heart rate and oxygen  Injection Technique:  Single shot  Procedures: ultrasound guided    Patient Position: supine  Prep: chlorhexidine    Location:  Mid thigh  Needle Type:  Stimuplex  Needle Gauge:  21 G  Needle Localization:  Anatomical landmarks and ultrasound guidance    Assessment:  Number of attempts:  1  Injection Assessment:  Incremental injection every 5 mL, no paresthesia, ultrasound image on chart, local visualized surrounding nerve on ultrasound, no intravascular symptoms and negative aspiration for blood  Patient tolerance:  Patient tolerated the procedure well with no immediate complications  Patient s/p preop adductor canal block. In PACU with c/o pain even though no sensory sensation over medial ankle which would be indicative of a previous functional adductor canal block. Decision made to replace the right adductor canal block with good effect. Patient states she has immediate relief.

## 2019-06-10 NOTE — BRIEF OP NOTE
BRIEF OPERATIVE NOTE    Date of Procedure: 6/10/2019   Preoperative Diagnosis: right knee djd m17.11  Postoperative Diagnosis: right knee djd m17.11    Procedure(s):  right total KNEE ARTHROPLASTY with prp with femoral  Surgeon(s) and Role:     Kyle Mesa MD - Primary         Surgical Assistant: Arpan Gamez    Surgical Staff:  Circ-1: Toshia Hernandez RN  Scrub Tech-1: Alshantanu Pore  Surg Asst-1: Claudia Purchase  Event Time In Time Out   Incision Start 8765    Incision Close 946      Anesthesia: General and block  Estimated Blood Loss: minimal  Specimens: bone  Findings: DJD right knee, [id number]  Complications: none   Implants:   Implant Name Type Inv.  Item Serial No.  Lot No. LRB No. Used Action   CEMENT BNE SYR FASTSET 70GM --  - [id number]  CEMENT BNE SYR FASTSET 70GM --   [employer] [id number] Right 1 Implanted   TRULIANT FIT TIBIAL TRAY   [id number] [employer]  Right 1 Implanted   FEM PS KEITH RT SZ 3.5 -- TRULIANT - [id number]  FEM PS KEITH RT SZ 3.5 -- TRULIANT [id number] [employer]  Right 1 Implanted   INSERT 3-PEG PAT 29MM --  - [id number]  INSERT 3-PEG PAT 29MM --  [id number] [employer]  Right 1 Implanted   TRULIANT TIBIAL INSERT   [id number] [employer]  Right 1 Implanted

## 2019-06-10 NOTE — ANESTHESIA PROCEDURE NOTES
Right Adductor Canal Block    Start time: 6/10/2019 7:27 AM  End time: 6/10/2019 7:29 AM  Performed by: Lul Waggoner MD  Authorized by: Lul Waggoner MD       Pre-procedure: Indications: at surgeon's request and post-op pain management    Preanesthetic Checklist: patient identified, risks and benefits discussed, site marked, timeout performed, anesthesia consent given and patient being monitored    Timeout Time: 07:27          Block Type:   Block Type:   Adductor canal  Laterality:  Right  Monitoring:  Standard ASA monitoring, continuous pulse ox, frequent vital sign checks, heart rate, responsive to questions and oxygen  Injection Technique:  Single shot  Procedures: ultrasound guided    Patient Position: supine  Prep: chlorhexidine    Prep comment:  X3  Location:  Mid thigh  Needle Type:  Stimuplex  Needle Gauge:  21 G  Needle Localization:  Ultrasound guidance and anatomical landmarks    Assessment:  Number of attempts:  1  Injection Assessment:  Incremental injection every 5 mL, no paresthesia, ultrasound image on chart, no intravascular symptoms, negative aspiration for blood and local visualized surrounding nerve on ultrasound  Patient tolerance:  Patient tolerated the procedure well with no immediate complications

## 2019-06-10 NOTE — ANESTHESIA PREPROCEDURE EVALUATION
Anesthetic History   No history of anesthetic complications            Review of Systems / Medical History  Patient summary reviewed, nursing notes reviewed and pertinent labs reviewed    Pulmonary        Sleep apnea: No treatment           Neuro/Psych   Within defined limits           Cardiovascular    Hypertension: well controlled              Exercise tolerance: >4 METS  Comments: Stress test neg. GI/Hepatic/Renal  Within defined limits              Endo/Other    Diabetes: well controlled, type 2    Morbid obesity and arthritis     Other Findings              Physical Exam    Airway  Mallampati: II  TM Distance: 4 - 6 cm  Neck ROM: normal range of motion   Mouth opening: Normal     Cardiovascular  Regular rate and rhythm,  S1 and S2 normal,  no murmur, click, rub, or gallop  Rhythm: regular  Rate: normal         Dental    Dentition: Caps/crowns     Pulmonary  Breath sounds clear to auscultation               Abdominal  Abdominal exam normal       Other Findings            Anesthetic Plan    ASA: 3  Anesthesia type: general and regional - saphenous block      Post-op pain plan if not by surgeon: peripheral nerve block single    Induction: Intravenous  Anesthetic plan and risks discussed with: Patient      IS block explained to pt/  Accepted.

## 2019-06-10 NOTE — H&P
Surgery History and Physical    Subjective:      Christian Alcantar is a 68 y.o.  female who presents with Severe Right Knee DJD, failed conservative management with ongoing pain and intermittent falls. Patient Active Problem List    Diagnosis Date Noted    Osteoarthritis of knee 06/10/2019    Right knee DJD 06/10/2019    Absent kidney, acquired 2019    Obesity, morbid ([address] 2018    Essential hypertension 2016    Type 2 diabetes mellitus without complication ([address]     Obstructive sleep apnea 2016    Morbid obesity ([address] 2016    Tricompartment osteoarthritis of knees, bilateral 2016     Past Medical History:   Diagnosis Date    Arthritis     Diabetes ([address] 2016    no meds    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     Hypertension     Morbid obesity ([address]     Sleep apnea     CPAP machine    Use of cane as ambulatory aid       Past Surgical History:   Procedure Laterality Date    HX CATARACT REMOVAL Bilateral     HX  SECTION          HX COLONOSCOPY      HX HYSTERECTOMY      HX NEPHRECTOMY Right     HX ROTATOR CUFF REPAIR Right     shoulder surgery      Social History     Tobacco Use    Smoking status: Never Smoker    Smokeless tobacco: Never Used   Substance Use Topics    Alcohol use: No      Family History   Problem Relation Age of Onset    Diabetes Mother     Diabetes Sister       Prior to Admission medications    Medication Sig Start Date End Date Taking? Authorizing Provider   losartan-hydroCHLOROthiazide (HYZAAR) 50-12.5 mg per tablet Take 1 Tab by mouth daily. 3/5/19  Yes Provider, Historical   latanoprost (XALATAN) 0.005 % ophthalmic solution Administer 1 Drop to right eye nightly. Yes Provider, Historical   cholecalciferol (VITAMIN D3) 1,000 unit tablet 1,000 Units.    Yes Provider, Historical   acetaminophen (TYLENOL) 325 mg tablet Take  by mouth every four (4) hours as needed for Pain. Indications: BACK PAIN, PAIN   Yes Provider, Historical   calcium-cholecalciferol, d3, (CALCIUM 600 + D) 600-125 mg-unit tab Take  by mouth daily. Yes Provider, Historical   diclofenac (VOLTAREN) 1 % gel Apply 2 g to affected area as needed. Indications: OSTEOARTHRITIS    Provider, Historical     No Known Allergies      Review of Systems:    A comprehensive review of systems was negative except for that written in the History of Present Illness.     Objective:     Patient Vitals for the past 8 hrs:   BP Temp Pulse Resp SpO2 Weight   06/10/19 0638 124/69 -- -- -- -- --   06/10/19 0635 -- 98.2 °F (36.8 °C) 65 16 98 % 88.6 kg (195 lb 7 oz)       Temp (24hrs), Av.2 °F (36.8 °C), Min:98.2 °F (36.8 °C), Max:98.2 °F (36.8 °C)      Physical Exam:  GENERAL: alert, cooperative, no distress, appears stated age, LUNG: clear to auscultation bilaterally, HEART: regular rate and rhythm, S1, S2 normal, no murmur, click, rub or gallop, EXTREMITIES:  extremities normal, atraumatic, no cyanosis or edema, severe right knee deformity, +crepitus, rom 5-115, NVI    Labs:   Recent Results (from the past 24 hour(s))   GLUCOSE, POC    Collection Time: 06/10/19  6:56 AM   Result Value Ref Range    Glucose (POC) 106 70 - 110 mg/dL       Data Review:  Radiology review: severe right knee DJD, Grade 4   per anesthesia    Assessment:     Principal Problem:    Osteoarthritis of knee (6/10/2019)    Active Problems:    Right knee DJD (6/10/2019)        Plan:     Right TKA  Admit post-op  Consent obtained

## 2019-06-10 NOTE — PROGRESS NOTES
1730--Pt has brought home cpap unit - pt signed liability waiver form - placed in paper chart  -called denia spoke with rich lee [id number] created to inspect unit

## 2019-06-10 NOTE — ANESTHESIA POSTPROCEDURE EVALUATION
Procedure(s):  right total KNEE ARTHROPLASTY with prp with femoral.    general, regional    Anesthesia Post Evaluation      Multimodal analgesia: multimodal analgesia used between 6 hours prior to anesthesia start to PACU discharge  Patient location during evaluation: bedside  Patient participation: complete - patient participated  Level of consciousness: awake  Pain management: adequate  Airway patency: patent  Anesthetic complications: no  Cardiovascular status: acceptable  Respiratory status: acceptable  Hydration status: acceptable  Comments: Patient right leg adductor canal block replaced in PACU due to patient's c/o discomfort despite signs of previously working block. Patient states now has pain relief. Post anesthesia nausea and vomiting:  controlled      Vitals Value Taken Time   /73 6/10/2019 10:52 AM   Temp 36.7 °C (98 °F) 6/10/2019 10:18 AM   Pulse 63 6/10/2019 10:56 AM   Resp 11 6/10/2019 10:56 AM   SpO2 100 % 6/10/2019 10:56 AM   Vitals shown include unvalidated device data.

## 2019-06-11 LAB
HCT VFR BLD AUTO: 27.6 % (ref 35–45)
HGB BLD-MCNC: 8.6 G/DL (ref 12–16)

## 2019-06-11 PROCEDURE — 97116 GAIT TRAINING THERAPY: CPT

## 2019-06-11 PROCEDURE — 65270000029 HC RM PRIVATE

## 2019-06-11 PROCEDURE — 36415 COLL VENOUS BLD VENIPUNCTURE: CPT

## 2019-06-11 PROCEDURE — 77010033678 HC OXYGEN DAILY

## 2019-06-11 PROCEDURE — 74011250636 HC RX REV CODE- 250/636: Performed by: ORTHOPAEDIC SURGERY

## 2019-06-11 PROCEDURE — 85018 HEMOGLOBIN: CPT

## 2019-06-11 PROCEDURE — 97162 PT EVAL MOD COMPLEX 30 MIN: CPT

## 2019-06-11 PROCEDURE — 74011250637 HC RX REV CODE- 250/637: Performed by: ORTHOPAEDIC SURGERY

## 2019-06-11 PROCEDURE — 97530 THERAPEUTIC ACTIVITIES: CPT

## 2019-06-11 RX ORDER — MELATONIN
1000 DAILY
Status: DISCONTINUED | OUTPATIENT
Start: 2019-06-11 | End: 2019-06-13 | Stop reason: HOSPADM

## 2019-06-11 RX ORDER — HYDROCHLOROTHIAZIDE 25 MG/1
12.5 TABLET ORAL DAILY
Status: DISCONTINUED | OUTPATIENT
Start: 2019-06-12 | End: 2019-06-13 | Stop reason: HOSPADM

## 2019-06-11 RX ORDER — LOSARTAN POTASSIUM 50 MG/1
50 TABLET ORAL DAILY
Status: DISCONTINUED | OUTPATIENT
Start: 2019-06-12 | End: 2019-06-13 | Stop reason: HOSPADM

## 2019-06-11 RX ORDER — LATANOPROST 50 UG/ML
1 SOLUTION/ DROPS OPHTHALMIC
Status: DISCONTINUED | OUTPATIENT
Start: 2019-06-11 | End: 2019-06-13 | Stop reason: HOSPADM

## 2019-06-11 RX ORDER — CALCIUM CARBONATE/VITAMIN D3 600MG-5MCG
1 TABLET ORAL DAILY
Status: DISCONTINUED | OUTPATIENT
Start: 2019-06-12 | End: 2019-06-13 | Stop reason: HOSPADM

## 2019-06-11 RX ADMIN — CEFAZOLIN 2 G: 10 INJECTION, POWDER, FOR SOLUTION INTRAVENOUS at 00:26

## 2019-06-11 RX ADMIN — VITAMIN D, TAB 1000IU (100/BT) 1000 UNITS: 25 TAB at 13:00

## 2019-06-11 RX ADMIN — ENOXAPARIN SODIUM 30 MG: 100 INJECTION SUBCUTANEOUS at 13:01

## 2019-06-11 RX ADMIN — Medication 10 ML: at 06:13

## 2019-06-11 RX ADMIN — Medication 10 ML: at 14:00

## 2019-06-11 RX ADMIN — ENOXAPARIN SODIUM 30 MG: 100 INJECTION SUBCUTANEOUS at 00:26

## 2019-06-11 RX ADMIN — OXYCODONE HYDROCHLORIDE AND ACETAMINOPHEN 2 TABLET: 5; 325 TABLET ORAL at 11:48

## 2019-06-11 RX ADMIN — OXYCODONE HYDROCHLORIDE AND ACETAMINOPHEN 2 TABLET: 5; 325 TABLET ORAL at 18:58

## 2019-06-11 RX ADMIN — MORPHINE SULFATE 2 MG: 2 INJECTION, SOLUTION INTRAMUSCULAR; INTRAVENOUS at 19:57

## 2019-06-11 NOTE — PROGRESS NOTES
Received a call from [facility] with [facility]. Will be following and assisting with care for MP after discharge from hospital.  Contact number [phone]. Will update care manager Ekta Cruz ( to be included in plan of care).

## 2019-06-11 NOTE — PROGRESS NOTES
Progress Note      Post Operative Day: 1    Assessment:    1. Status post  TOTAL KNEE ARTHROPLASTY [75944] (KNEE ARTHROPLASTY TOTAL) for Right knee DJD [M17.11] 6/10/2019,  Progressing. PLAN:    1. Mobilize. Continue P.T.  2. WBAT with walker. Knee immobilizer when in bed. 3. Lovenox for DVT prophylaxis  4. Discharge Planning. HPI: Ori Contreras is a 68 y.o. female patient without new complaints. No new orthopaedic changes. Blood pressure 121/70, pulse 64, temperature 97.6 °F (36.4 °C), resp. rate 18, height 4' 9\" (1.448 m), weight 88.6 kg (195 lb 7 oz), SpO2 100 %. CBC w/Diff   Lab Results   Component Value Date/Time    WBC 7.0 05/29/2019 08:56 AM    RBC 4.45 05/29/2019 08:56 AM    HGB 8.6 (L) 06/11/2019 02:30 AM    HCT 27.6 (L) 06/11/2019 02:30 AM    MCV 84.0 05/29/2019 08:56 AM    MCH 26.5 05/29/2019 08:56 AM    MCHC 31.6 05/29/2019 08:56 AM    RDW 15.3 (H) 05/29/2019 08:56 AM    Lab Results   Component Value Date/Time    MONOS 7 05/29/2019 08:56 AM    EOS 2 05/29/2019 08:56 AM    BASOS 0 05/29/2019 08:56 AM    RDW 15.3 (H) 05/29/2019 08:56 AM             Physical Assessment:  General: in no apparent distress, well developed and well nourished, non-toxic, in no respiratory distress and acyanotic, alert and oriented times 3   Wound: clean, no drainage   Extremities:  Neurovascular intact RLE. Compartments soft and NT distal to surgical site. Able to contract quadriceps. Plantar and dorsiflexion intact. Palpable DP/PT pulses noted. Denies paresthesias    Dressing:  CDi   DVT Exam:   No exam evidence to suggest DVT. Compartments soft and NT.           Radiology: no new ortho studies          - [facility], [address] [place] [address]  6/11/2019    Office [phone]

## 2019-06-11 NOTE — PROGRESS NOTES
Problem: Mobility Impaired (Adult and Pediatric)  Goal: *Acute Goals and Plan of Care (Insert Text)  Description  Physical Therapy Goals  Initiated 6/11/2019 and to be accomplished within 7 day(s)  1. Patient will move from supine to sit and sit to supine  in bed with modified independence. 2.  Patient will transfer from bed to chair and chair to bed with modified independence using the least restrictive device. 3.  Patient will perform sit to stand with modified independence. 4.  Patient will ambulate with modified independence for 150 feet with the least restrictive device. 5.  Patient will ascend/descend 10 stairs with handrail(s) with supervision/set-up. Outcome: Progressing Towards Goal    PHYSICAL THERAPY EVALUATION    Patient: Melissa Ayala (74 y.o. female)  Date: 6/11/2019  Primary Diagnosis: Right knee DJD [M17.11]  Procedure(s) (LRB):  right total KNEE ARTHROPLASTY with prp with femoral (Right) 1 Day Post-Op   Precautions: WBAT, Fall  PLOF: Mod I with cane    ASSESSMENT :  WBAT RLE s/p TKA 6/10/2019. Able to wiggle toes on RLE. Unable to demonstrate quad set on RLE. Knee immobilizer donned and educated patient on role. Min A for supine to sit; assist with RLE. Seated EOB with good balance; 5 minutes. Supervision for sit to stand. Bed to chair transfer with ww min A. Decreased gait speed and shortened step length d/t pain with WB RLE. Seated in chair with good eccentric control. Education provided on bed mobility, transfers, ADLs, balance, amb, safety, exercise, role of PT, plan of care, cognition, skin integrity, vitals as indicated. Educated on need for RN assistance with mobility; verbalized understanding. Call flores in reach. RN Yusuf Acosta aware. Patient will benefit from skilled intervention to address the above impairments. Patient's rehabilitation potential is considered to be Good  Factors which may influence rehabilitation potential include:   ? None noted  ? Mental ability/status  ? Medical condition  ? Home/family situation and support systems  ? Safety awareness  ? Pain tolerance/management  ? Other:      PLAN :  Recommendations and Planned Interventions:   ?           Bed Mobility Training             ? Neuromuscular Re-Education  ? Transfer Training                   ? Orthotic/Prosthetic Training  ? Gait Training                          ? Modalities  ? Therapeutic Exercises           ? Edema Management/Control  ? Therapeutic Activities            ? Family Training/Education  ? Patient Education  ? Other (comment):    Frequency/Duration: Patient will be followed by physical therapy 1-2 times per day/4-7 days per week to address goals. Discharge Recommendations: Home Health pending stairs  Further Equipment Recommendations for Discharge: rolling walker     SUBJECTIVE:   Patient stated ? That sounds great. ?    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     Hypertension     Morbid obesity ([facility]     Sleep apnea     CPAP machine    Use of cane as ambulatory aid      Past Surgical History:   Procedure Laterality Date    HX CATARACT REMOVAL Bilateral     HX  SECTION          HX COLONOSCOPY      HX HYSTERECTOMY      HX NEPHRECTOMY Right     HX ROTATOR CUFF REPAIR Right     shoulder surgery     Barriers to Learning/Limitations: None  Compensate with: Visual Cues, Verbal Cues, Tactile Cues and Kinesthetic Cues    Home Situation:  Home Situation  Home Environment: Private residence  # Steps to Enter: 0  One/Two Story Residence: Two story  Living Alone: No  Support Systems: Family member(s)  Patient Expects to be Discharged to[de-identified] Private residence  Current DME Used/Available at Home: Cane, straight    Critical Behavior:  Neurologic State: Alert  Orientation Level: Oriented X4  Cognition: Follows commands  Safety/Judgement: Fall prevention  Psychosocial  Patient Behaviors: Cooperative  Purposeful Interaction: Yes  Pt Identified Daily Priority: Clinical issues (comment)  Car[employer] Process: Teaching/learning; Attend basic human needs;Create healing environment  Caring Interventions: Reassure; Therapeutic modalities  Reassure: Informing; Acceptance;Caring rounds  Therapeutic Modalities: Humor; Intentional therapeutic touch  Skin Condition/Temp: Warm     Skin Integrity: Incision (comment)(Right Knee)  Skin Integumentary  Skin Color: Appropriate for ethnicity  Skin Condition/Temp: Warm  Skin Integrity: Incision (comment)(Right Knee)  Turgor: Non-tenting  Hair Growth: Present  Varicosities: Absent     Strength:    Manual Muscle Testing (LE)         R     L    Hip Flexion:   3-/5  4+/5  Knee EXT:   N/A  4+/5  Knee FLEX:   N/A  4+/5  Ankle DF:   3+/5  4+/5  _________________________________________________   Tone & Sensation:   Tone: LLE normal; RLE immobilizer  Sensation: Not tested  Range Of Motion:  RLE  Hip: WFL  Knee: immobilizer  Ankle: WFL  LLE WFL AROM  Functional Mobility:  Bed Mobility:  Supine to Sit: Minimum assistance  Transfers:  Sit to Stand: Supervision  Stand to Sit: Supervision  Bed to Chair: Minimum assistance  Balance:   Sitting: Impaired  Sitting - Static: Good (unsupported)  Sitting - Dynamic: Good (unsupported)  Standing: Impaired  Standing - Static: Fair  Standing - Dynamic : Fair  Neuro Re-education:  Seated EOB 5 minutes    Pain:  Pain level pre-treatment: 3/10   Pain level post-treatment: 3/10   Pain Scale 1: Numeric (0 - 10)    Activity Tolerance:   Good    After treatment:   ?         Patient left in no apparent distress sitting up in chair  ? Patient left in no apparent distress in bed  ? Call bell left within reach  ? Nursing notified  ? Caregiver present  ?          Bed alarm activated  ? SCDs applied    COMMUNICATION/EDUCATION:   ?         Role of physical therapy and plan of care in the acute care setting. ?         Fall prevention education was provided and the patient/caregiver indicated understanding. ? Patient/family have participated as able in goal setting and plan of care. ?         Patient/family agree to work toward stated goals and plan of care. ?         Patient understands intent and goals of therapy, but is neutral about his/her participation. ? Patient is unable to participate in goal setting/plan of care: ongoing with therapy staff.     Thank you for this referral.  Jhon Hull, PT   Time Calculation: 17 mins    Eval Complexity: History: MEDIUM  Complexity : 1-2 comorbidities / personal factors will impact the outcome/ POC Exam:MEDIUM Complexity : 3 Standardized tests and measures addressing body structure, function, activity limitation and / or participation in recreation  Presentation: MEDIUM Complexity : Evolving with changing characteristics  Clinical Decision Making:Medium Complexity clinical judgement; ROM, MMT, functional mobility  Overall Complexity:MEDIUM

## 2019-06-11 NOTE — PROGRESS NOTES
conducted an initial consultation and Spiritual Assessment for Cayden Stahl, who is a 68 y.o.,female. Patients Primary Language is: [place]. According to the patients EMR Hindu Affiliation is: [place]. The reason the Patient came to the hospital is:   Patient Active Problem List    Diagnosis Date Noted    Osteoarthritis of knee 06/10/2019    Right knee DJD 06/10/2019    Absent kidney, acquired 03/28/2019    Obesity, morbid ([address] 11/14/2018    Essential hypertension 12/31/2016    Type 2 diabetes mellitus without complication ([address] 37/40/6707    Obstructive sleep apnea 01/05/2016    Morbid obesity ([facility] 01/05/2016    Tricompartment osteoarthritis of knees, bilateral 01/01/2016        The  provided the following Interventions:  Initiated a relationship of care and support with patient in room 2220 today on day one post surgery. Listened empathically as patient shared her story and her love of God as he continues to be with here and heal her body. . Patient in great spirits. Provided information about Spiritual Care Services. Offered prayer and assurance of continued prayers on patients behalf. The following outcomes were achieved:  Patient shared limited information about her medical narrative and spiritual journey/beliefs. Patient processed feeling about current hospitalization. Patient expressed gratitude for pastoral care visit. Assessment:  Patient does not have any Anabaptist/cultural needs that will affect patients preferences in health care. There are no further spiritual or Anabaptist issues which require Spiritual Care Services interventions at this time. Plan:  Chaplains will continue to follow and will provide pastoral care on an as needed/requested basis    . [facility]   [phone]

## 2019-06-11 NOTE — OP NOTES
[facility]  OPERATIVE REPORT    Name:  Matthew Hermosillo  MR#:   [id number]  :  1945  ACCOUNT #:  [de-identified]  DATE OF SERVICE:  06/10/2019    PREOPERATIVE DIAGNOSIS:  Right knee degenerative joint disease, varus malalignment with deformity. POSTOPERATIVE DIAGNOSIS:  Right knee degenerative joint disease, varus malalignment with deformity. PROCEDURE PERFORMED:  Right total knee arthroplasty with platelet-rich plasma. SURGEON:  Ramu Arredondo MD    ASSISTANT:  Bao Kim , assisted with surgery and closure. INCISION START:  08:17    INCISION CLOSE:  09:46    ANESTHESIA:  General with block. COMPLICATIONS:  None. SPECIMENS REMOVED:  Bone. IMPLANTS:  Exactech large Truliant system, posterior stabilized size 3.5 femur, size 2.5 tibia, 17 mm posterior stabilized poly, 3-peg patella 29 mm. ESTIMATED BLOOD LOSS:  Minimal.    INDICATIONS FOR PROCEDURE:  The patient is a pleasant 46-year-old black female with history of right knee DJD. She was brought to the operative suite for definitive management. Informed consent was obtained. Risks and benefits were explained to her in full including but not limited to bleeding, infection, neurovascular damage, pain, stiffness, swelling, further surgery, revision surgery, incomplete resolution of symptoms, and medical complications. PROCEDURE:  The patient was brought to the operative suite, placed on table in the supine position. She was given block prior to doing this. We proceeded to prep and drape the right leg in normal sterile fashion. The tourniquet was placed proximally at 250 mmHg prior to cementation for a total of 35 minutes. We proceeded to make a standard midline incision and dissected down through the skin. Patella was everted. Severe arthritis appreciated throughout the knee, grade III, IV particularly over the medial compartment of the knee  osteophytes.   We proceeded to then excise the inferior and superior fat pad. ACL and PCL were excised. Medial and lateral meniscus were excised. Medial release was done to balance out the knee utilizing an elevator and cautery. Adequate balancing was appreciated. We then proceeded to place IM guidewire after drilling into femur. This was placed at 5 degrees of valgus. A 10 mm cut was made after securing the block. We then proceeded to size the distal femur. It was found to be a 3.5 mm femur. The four-in-one block was secured. We proceeded to make an anterior, posterior, and chamfer cuts. After this was done, we proceeded to turn our attention to the tibia. The IM guidewire was placed after drilling in the tibia. We proceeded to measure off the most deficient side with a stylet. The proximal cutting block was secured. The cut was made protecting the soft tissues. We then proceeded to size the tibia, was found to 2.5 mm in size. As such, we proceeded then to balance the knee. Residual bleeders were cauterized. Adequate balance was appreciated with 15 and 17 mm poly with some flexion and extension. After this was done, we proceeded to inflate the tourniquet. We proceeded to then punch the tibia. The trial tibial was secured. We then proceeded to turn our attention back to the femur. The femoral component was secured. The notch was reamed. We then turned our attention to the patella. Patella was secured with towel clips. With freehand cut, patella was made to size 29 mm 3-peg. After this was punched and drilled, the trial patella was placed. We then proceeded to trial the knee. Excellent extension was appreciated at 0 degrees and flexion to 115 degrees limited by her body habitus posteriorly. Adequate stability throughout the range of motion of the knee was appreciated with 15 with slight laxity and 17 stable. After this was done, we proceeded to then copiously irrigate the knee. All the trial components were removed.   The final components were decided upon the back table as per above. We proceeded to then secure the components with cement. Residual cement was removed in both the wet and hard stage. Axial load was placed across the femur to secure this. After this was done, we proceeded once again to trial the knee with 15 and 17 mm poly trial.  It was felt that 17 was found to be a little bit more stable grossly, as such 17 mm final poly was placed after copious irrigation with pulsatile lavage. Tranexamic acid was placed in the knee. Any residual bleeders were cauterized. We proceeded then to close the fascia with a running 0 Quill stitch. PRP was placed in the intra-articular portion of the knee. Deep tissue was closed with 2-0 Quill stitch. The skin was closed with Monocryl and Prineo. Dry sterile dressing was placed. Tourniquet was taken down. She was awoken from anesthesia and brought to postop care in stable condition.         Ildefonso Khoury MD      [id number]  D:  06/10/2019 10:30  T:  06/10/2019 13:51  JOB #:  [id number]

## 2019-06-11 NOTE — PROGRESS NOTES
Ortho rounds complete with coordinator and PA present at bedside. Plan of care discussed. Maris Walls Rounded on post total knee replacement. Patient and family educated: Activity:   OOB for all meals,   Walk every hour to prevent blood clots, help move better and lessen stiffness. Do not put anything under knee. Towel roll under ankle. VTE prophylaxis:   Use SCD pumps except when walking. Ankle pumps 10 times an hour at hospital & home. Take blood thinner medication as ordered by surgeon. Do not skip a dose. Pain Control:  Pain medications side effects discussed. ice, distraction, moving, & change position to help with pain. Rest between activity. Don't get nauseated. Eat a snack before taking pain medication    Do not get constipated: take stool softener/mild laxative daily while on narcotics. Incentive Spirometry:    Use of incentive spirometer 10 x/hr. Demonstration  1000 ml x 3  Wound Care: Dressing clean/dry/intact. Ok to shower post op day #5, no submerging in water. No lotions, powders, creams to surgical leg. .    Diet:   Eat for healing. Protein heals bone/muscle. Drink 8 glasses of water a day. Patient Safety:   Call light & belongings in reach. Call for help when want to walk or get OOB. Educational material given. Patient agreed to keep doing everything at home to prevent complications & have a successful recovery. Patient verbalizing the importance of using incentive spirometer, ankle pumping, walking frequently, doing exercised at home twice a day, taking their anticoagulant per physician instruction, taking medication and drinking water to prevent complication, and eating protein for healing. Patient MP verbalized understand. Given the opportunity for asking questions. Additional educational materials provided, all questions answered. Safety measures remain in place, call bell in reach.     Bruno Simpson has decided with their surgeon to have a joint replacement to improve mobility and decrease pain. Joint Replacement Pre-Operative class was attended 6/4/2019. Topics discussed included surgery preparation, what to expect the day of surgery, medications (to include a multimodal approach to pain control and limiting narcotics), nutrition, glycemic control, respiratory therapy, physical and occupational therapy, and discharge planning. Discussed the importance of using these alternative pain management methods with the goal of using less opioid use after surgery and at home. A patient education notebook was provided and the opportunity was given to ask questions. The phone number of the Orthopaedic  was provided for any future questions or concerns.

## 2019-06-11 NOTE — PROGRESS NOTES
Problem: Falls - Risk of  Goal: *Absence of Falls  Description  Document Jasmineerene Bunting Fall Risk and appropriate interventions in the flowsheet. Outcome: Progressing Towards Goal     Problem: Patient Education: Go to Patient Education Activity  Goal: Patient/Family Education  Outcome: Progressing Towards Goal     Problem: Pain  Goal: *Control of Pain  Outcome: Progressing Towards Goal     Problem: Patient Education: Go to Patient Education Activity  Goal: Patient/Family Education  Outcome: Progressing Towards Goal     Problem: Patient Education: Go to Patient Education Activity  Goal: Patient/Family Education  Outcome: Progressing Towards Goal     Problem: Surgical Pathway Day of Surgery  Goal: Off Pathway (Use only if patient is Off Pathway)  Outcome: Progressing Towards Goal  Goal: Activity/Safety  Outcome: Progressing Towards Goal  Goal: Consults, if ordered  Outcome: Progressing Towards Goal  Goal: Nutrition/Diet  Outcome: Progressing Towards Goal  Goal: Medications  Outcome: Progressing Towards Goal  Goal: Respiratory  Outcome: Progressing Towards Goal  Goal: Treatments/Interventions/Procedures  Outcome: Progressing Towards Goal  Goal: Psychosocial  Outcome: Progressing Towards Goal  Goal: *No signs and symptoms of infection or wound complications  Outcome: Progressing Towards Goal  Goal: *Optimal pain control at patient's stated goal  Outcome: Progressing Towards Goal  Goal: *Adequate urinary output (equal to or greater than 30 milliliters/hour)  Description  Ambulatory Surgery patients voiding without difficulty.   Outcome: Progressing Towards Goal  Goal: *Hemodynamically stable  Outcome: Progressing Towards Goal  Goal: *Tolerating diet  Outcome: Progressing Towards Goal  Goal: *Demonstrates progressive activity  Outcome: Progressing Towards Goal     Problem: Patient Education: Go to Patient Education Activity  Goal: Patient/Family Education  Outcome: Progressing Towards Goal     Problem: Knee Replacement: Day of Surgery/Unit  Goal: Off Pathway (Use only if patient is Off Pathway)  Outcome: Progressing Towards Goal  Goal: Activity/Safety  Outcome: Progressing Towards Goal  Goal: Consults, if ordered  Outcome: Progressing Towards Goal  Goal: Diagnostic Test/Procedures  Outcome: Progressing Towards Goal  Goal: Nutrition/Diet  Outcome: Progressing Towards Goal  Goal: Medications  Outcome: Progressing Towards Goal  Goal: Respiratory  Outcome: Progressing Towards Goal  Goal: Treatments/Interventions/Procedures  Outcome: Progressing Towards Goal  Goal: Psychosocial  Outcome: Progressing Towards Goal  Goal: *Initiate mobility  Outcome: Progressing Towards Goal  Goal: *Optimal pain control at patient's stated goal  Outcome: Progressing Towards Goal  Goal: *Hemodynamically stable  Outcome: Progressing Towards Goal

## 2019-06-11 NOTE — PROGRESS NOTES
Problem: Mobility Impaired (Adult and Pediatric)  Goal: *Acute Goals and Plan of Care (Insert Text)  Description  Physical Therapy Goals  Initiated 6/11/2019 and to be accomplished within 7 day(s)  1. Patient will move from supine to sit and sit to supine  in bed with modified independence. 2.  Patient will transfer from bed to chair and chair to bed with modified independence using the least restrictive device. 3.  Patient will perform sit to stand with modified independence. 4.  Patient will ambulate with modified independence for 150 feet with the least restrictive device. 5.  Patient will ascend/descend 10 stairs with handrail(s) with supervision/set-up. 6/11/2019 1108 by Taina Funes, PT  Outcome: Progressing Towards Goal  PHYSICAL THERAPY TREATMENT    Patient: Georgette Eubanks (74 y.o. female)  Date: 6/11/2019  Diagnosis: Right knee DJD [M17.11] Osteoarthritis of knee  Procedure(s) (LRB):  right total KNEE ARTHROPLASTY with prp with femoral (Right) 1 Day Post-Op  Precautions: WBAT, Fall  WBAT RLE  PLOF: Mod I with cane    ASSESSMENT:  Motivated to participate with PT. Seated in chair upon entry. Supervision for sit to stand. Amb 100ft with ww and supervision. Decreased gait speed with flexed posture and shortened step length. Returned to seated EOB with good eccentric control. Min A for sit to supine; assistance with RLE. Reviewed role of incentive spirometer; demonstrated use last session. Education provided on bed mobility, transfers, ADLs, balance, amb, safety, exercise, role of PT, plan of care, cognition, skin integrity, vitals as indicated. Educated on need for RN assistance with mobility; verbalized understanding. Call flores in reach. RN Jose Mathews aware. Progression toward goals:   ?      Improving appropriately and progressing toward goals  ? Improving slowly and progressing toward goals  ?       Not making progress toward goals and plan of care will be adjusted PLAN:  Patient continues to benefit from skilled intervention to address the above impairments. Continue treatment per established plan of care. Discharge Recommendations:  Home Health  Further Equipment Recommendations for Discharge:  rolling walker     SUBJECTIVE:   Patient stated ? I will do whatever you ask.?    OBJECTIVE DATA SUMMARY:   Critical Behavior:  Neurologic State: Alert  Orientation Level: Oriented X4  Cognition: Follows commands  Safety/Judgement: Fall prevention  Functional Mobility Training:  Bed Mobility:  Sit to Supine: Minimum assistance  Scooting: Supervision  Transfers:  Sit to Stand: Supervision  Stand to Sit: Supervision  Balance:  Sitting: Impaired  Sitting - Static: Good (unsupported)  Sitting - Dynamic: Good (unsupported)  Standing: Impaired  Standing - Static: Fair  Standing - Dynamic : Fair   Ambulation/Gait Training:  Distance (ft): 100 Feet (ft)  Assistive Device: Walker, rolling  Ambulation - Level of Assistance: Supervision    Pain:  Pain level pre-treatment: 4/10  Pain level post-treatment: 4/10     Activity Tolerance:   Good    After treatment:   ? Patient left in no apparent distress sitting up in chair  ? Patient left in no apparent distress in bed  ? Call bell left within reach  ? Nursing notified  ? Caregiver present  ? Bed alarm activated  ? SCDs applied      COMMUNICATION/EDUCATION:   ?         Role of physical therapy in the acute care setting. ?         Fall prevention education was provided and the patient/caregiver indicated understanding. ? Patient/family have participated as able in working toward goals and plan of care. ?         Patient/family agree to work toward stated goals and plan of care. ?         Patient understands intent and goals of therapy, but is neutral about his/her participation. ? Patient is unable to participate in stated goals/plan of care: ongoing with therapy staff.       Bhumika Carlisle, PT   Time Calculation: 15 mins

## 2019-06-11 NOTE — PROGRESS NOTES
Problem: Discharge Planning  Goal: *Discharge to safe environment  Outcome: Progressing Towards Goal   Home with hh    Reason for Admission:  rtkr                   RRAT Score:      13               Plan for utilizing home health:    yes                      Current Advanced Directive/Advance Care Plan: none    Likelihood of Readmission:  low                         Transition of Care Plan:  Spoke with pt, lives with daughter Ernestine Arevalo. She states she is independent with adls and amb using a cane prior to admit. Has a walker at home. , cpap, shower chair. Has [address] 2 story home. Bathroom down stairs, but no bedroom. pcp Aparna Woody. Does not  Want me to call pcp office, they are aware she is here for surg. Confirmed demographics. Plan home with hh      Patient has designated _____daughter __________________ to participate in his/her discharge plan and to receive any needed information. Name: Kimberley Rachel  Address: same  Phone number: [phone] [facility] Provider list has been given to the patient and/or patient representative. Patient and/or patient representative has signed the [place] of Choice selecting [facility]as their preference agency and a copy given. Both Home Health Provider list and Freedom of Choice have been placed on the chart. Care Management Interventions  PCP Verified by CM: Yes  Palliative Care Criteria Met (RRAT>21 & CHF Dx)?: No  Mode of Transport at Discharge:  Other (see comment)  Transition of Care Consult (CM Consult): Discharge Planning, [address]: No  Reason Outside [place]: Physician referred to specific agency  Discharge Durable Medical Equipment: No  Physical Therapy Consult: Yes  Occupational Therapy Consult: Yes  Speech Therapy Consult: No  Current Support Network: Relative's Home  Confirm Follow Up Transport: Family  Plan discussed with Pt/Family/Caregiver: Yes  Freedom of Choice Offered: Yes  Discharge Location  Discharge Placement: Home with home health

## 2019-06-11 NOTE — PROGRESS NOTES
Internal Medicine Progress Note    Patient's Name: Krupa Sample  Admit Date: 6/10/2019  Length of Stay: 1      Assessment/Plan     Active Hospital Problems    Diagnosis Date Noted    Osteoarthritis of knee 06/10/2019    Right knee DJD 06/10/2019    Obesity, morbid ([facility] [address] 11/14/2018    Essential hypertension 12/31/2016    Type 2 diabetes mellitus without complication ([facility] 52/42/2394    Obstructive sleep apnea 01/05/2016     Overview:   1/20/2016 AHI/RDI: 6                   REM AHI: 27               SUPINE AHI: 10 SpO2 eli: 81% PLMI: 4      PAP titration Date: 3/15/2016  CPAP11 cm H2O (AHI: 0; SpO2 eli: 93%)  CPAP: 11 cm H2O PLMI: 3    CPAP 11  DME: [facility]       - Diet and mobilization per primary team  - Pain control PRN  - PT/OT  - BP in good range  - Cont CPAP qhs  - SSI and accuchecks  - Cont acceptable home medications for chronic conditions   - DVT protocol    I have personally reviewed all pertinent labs and films that have officially resulted over the last 24 hours. I have personally checked for all pending labs that are awaiting final results.     Subjective     Pt s/e @ bedside  No major events overnight  Pt offers no complaints this AM  Pain only at a 2  Denies CP or SOB    Objective     Visit Vitals  /70 (BP 1 Location: Right arm, BP Patient Position: At rest)   Pulse 64   Temp 97.6 °F (36.4 °C)   Resp 18   Ht 4' 9\" (1.448 m)   Wt 88.6 kg (195 lb 7 oz)   SpO2 100%   BMI 42.29 kg/m²       Physical Exam:  General Appearance: NAD, conversant  Lungs: CTA with normal respiratory effort  CV: RRR, no m/r/g  Abdomen: soft, non-tender, normal bowel sounds  Extremities: no cyanosis, no peripheral edema, RLE dressing in place  Neuro: No focal deficits, motor/sensory intact    Lab/Data Reviewed:  BMP: No results found for: NA, K, CL, CO2, AGAP, GLU, BUN, CREA, GFRAA, GFRNA  CBC:   Lab Results   Component Value Date/Time    HGB 8.6 (L) 06/11/2019 02:30 AM    HCT 27.6 (L) 06/11/2019 02:30 AM       Imaging Reviewed:  No results found.     Medications Reviewed:  Current Facility-Administered Medications   Medication Dose Route Frequency    sodium chloride (NS) flush 5-40 mL  5-40 mL IntraVENous Q8H    sodium chloride (NS) flush 5-40 mL  5-40 mL IntraVENous PRN    oxyCODONE-acetaminophen (PERCOCET) 5-325 mg per tablet 2 Tab  2 Tab Oral Q4H PRN    HYDROcodone-acetaminophen (NORCO)  mg tablet 2 Tab  2 Tab Oral Q4H PRN    ketorolac (TORADOL) injection 15 mg  15 mg IntraVENous ONCE PRN    morphine injection 2 mg  2 mg IntraVENous Q4H PRN    naloxone (NARCAN) injection 0.4 mg  0.4 mg IntraVENous PRN    ondansetron (ZOFRAN) injection 4 mg  4 mg IntraVENous Q4H PRN    bisacodyl (DULCOLAX) suppository 10 mg  10 mg Rectal DAILY PRN    enoxaparin (LOVENOX) injection 30 mg  30 mg SubCUTAneous Q12H           Otto Rosas DO  Internal Medicine, Hospitalist  Pager: [phone]  [address] [place] [address]

## 2019-06-11 NOTE — PROGRESS NOTES
Problem: Falls - Risk of  Goal: *Absence of Falls  Description  Document Cb Bunting Fall Risk and appropriate interventions in the flowsheet.   Outcome: Progressing Towards Goal     Problem: Patient Education: Go to Patient Education Activity  Goal: Patient/Family Education  Outcome: Progressing Towards Goal     Problem: Pain  Goal: *Control of Pain  Outcome: Progressing Towards Goal     Problem: Patient Education: Go to Patient Education Activity  Goal: Patient/Family Education  Outcome: Progressing Towards Goal

## 2019-06-11 NOTE — PROGRESS NOTES
Assumed care; Pt resting in bed; no distress noted; Pt denies pain; bed in low position; Side rails up x 3; Call light and personal belonging within reach. Will continue to monitor. 2015: Bedpan. Incontinence care completed  2115: Bedpan. Incontinence care completed  2146: Pt c/o of pain. See Mar. Will continue to monitor.

## 2019-06-12 LAB
ANION GAP SERPL CALC-SCNC: 3 MMOL/L (ref 3–18)
BASOPHILS # BLD: 0 K/UL (ref 0–0.1)
BASOPHILS NFR BLD: 0 % (ref 0–2)
BUN SERPL-MCNC: 27 MG/DL (ref 7–18)
BUN/CREAT SERPL: 26 (ref 12–20)
CALCIUM SERPL-MCNC: 8.1 MG/DL (ref 8.5–10.1)
CHLORIDE SERPL-SCNC: 109 MMOL/L (ref 100–108)
CO2 SERPL-SCNC: 31 MMOL/L (ref 21–32)
CREAT SERPL-MCNC: 1.03 MG/DL (ref 0.6–1.3)
DIFFERENTIAL METHOD BLD: ABNORMAL
EOSINOPHIL # BLD: 0.1 K/UL (ref 0–0.4)
EOSINOPHIL NFR BLD: 1 % (ref 0–5)
ERYTHROCYTE [DISTWIDTH] IN BLOOD BY AUTOMATED COUNT: 15.3 % (ref 11.6–14.5)
GLUCOSE SERPL-MCNC: 116 MG/DL (ref 74–99)
HCT VFR BLD AUTO: 22.3 % (ref 35–45)
HCT VFR BLD AUTO: 23.6 % (ref 35–45)
HGB BLD-MCNC: 7 G/DL (ref 12–16)
HGB BLD-MCNC: 7.6 G/DL (ref 12–16)
LYMPHOCYTES # BLD: 3.5 K/UL (ref 0.9–3.6)
LYMPHOCYTES NFR BLD: 25 % (ref 21–52)
MCH RBC QN AUTO: 26.5 PG (ref 24–34)
MCHC RBC AUTO-ENTMCNC: 31.4 G/DL (ref 31–37)
MCV RBC AUTO: 84.5 FL (ref 74–97)
MONOCYTES # BLD: 1.9 K/UL (ref 0.05–1.2)
MONOCYTES NFR BLD: 14 % (ref 3–10)
NEUTS SEG # BLD: 8.4 K/UL (ref 1.8–8)
NEUTS SEG NFR BLD: 60 % (ref 40–73)
PLATELET # BLD AUTO: 158 K/UL (ref 135–420)
PMV BLD AUTO: 11 FL (ref 9.2–11.8)
POTASSIUM SERPL-SCNC: 4.5 MMOL/L (ref 3.5–5.5)
RBC # BLD AUTO: 2.64 M/UL (ref 4.2–5.3)
SODIUM SERPL-SCNC: 143 MMOL/L (ref 136–145)
WBC # BLD AUTO: 13.9 K/UL (ref 4.6–13.2)

## 2019-06-12 PROCEDURE — 97166 OT EVAL MOD COMPLEX 45 MIN: CPT

## 2019-06-12 PROCEDURE — 85025 COMPLETE CBC W/AUTO DIFF WBC: CPT

## 2019-06-12 PROCEDURE — 85018 HEMOGLOBIN: CPT

## 2019-06-12 PROCEDURE — 36415 COLL VENOUS BLD VENIPUNCTURE: CPT

## 2019-06-12 PROCEDURE — 74011250637 HC RX REV CODE- 250/637: Performed by: ORTHOPAEDIC SURGERY

## 2019-06-12 PROCEDURE — 74011000250 HC RX REV CODE- 250: Performed by: ORTHOPAEDIC SURGERY

## 2019-06-12 PROCEDURE — 97116 GAIT TRAINING THERAPY: CPT

## 2019-06-12 PROCEDURE — 74011250636 HC RX REV CODE- 250/636: Performed by: ORTHOPAEDIC SURGERY

## 2019-06-12 PROCEDURE — 97530 THERAPEUTIC ACTIVITIES: CPT

## 2019-06-12 PROCEDURE — 65270000029 HC RM PRIVATE

## 2019-06-12 PROCEDURE — 77010033678 HC OXYGEN DAILY

## 2019-06-12 PROCEDURE — 77030037870 HC GLD SHT PREVALON SAGE -B

## 2019-06-12 PROCEDURE — 80048 BASIC METABOLIC PNL TOTAL CA: CPT

## 2019-06-12 RX ADMIN — Medication 10 ML: at 21:37

## 2019-06-12 RX ADMIN — LOSARTAN POTASSIUM 50 MG: 50 TABLET ORAL at 17:45

## 2019-06-12 RX ADMIN — MORPHINE SULFATE 2 MG: 2 INJECTION, SOLUTION INTRAMUSCULAR; INTRAVENOUS at 07:37

## 2019-06-12 RX ADMIN — LATANOPROST 1 DROP: 50 SOLUTION OPHTHALMIC at 21:36

## 2019-06-12 RX ADMIN — OXYCODONE HYDROCHLORIDE AND ACETAMINOPHEN 2 TABLET: 5; 325 TABLET ORAL at 13:48

## 2019-06-12 RX ADMIN — ENOXAPARIN SODIUM 30 MG: 100 INJECTION SUBCUTANEOUS at 00:16

## 2019-06-12 RX ADMIN — ENOXAPARIN SODIUM 30 MG: 100 INJECTION SUBCUTANEOUS at 13:52

## 2019-06-12 RX ADMIN — HYDROCHLOROTHIAZIDE 12.5 MG: 25 TABLET ORAL at 09:02

## 2019-06-12 RX ADMIN — Medication 1 TABLET: at 09:02

## 2019-06-12 RX ADMIN — OXYCODONE HYDROCHLORIDE AND ACETAMINOPHEN 2 TABLET: 5; 325 TABLET ORAL at 17:54

## 2019-06-12 RX ADMIN — HYDROCODONE BITARTRATE AND ACETAMINOPHEN 2 TABLET: 10; 325 TABLET ORAL at 00:09

## 2019-06-12 RX ADMIN — OXYCODONE HYDROCHLORIDE AND ACETAMINOPHEN 2 TABLET: 5; 325 TABLET ORAL at 09:02

## 2019-06-12 RX ADMIN — VITAMIN D, TAB 1000IU (100/BT) 1000 UNITS: 25 TAB at 09:02

## 2019-06-12 NOTE — PROGRESS NOTES
Received report from [employer]. Patient complained of pain, medication given as prescribed. Helped to [facility]. No other issues on this shift. Bedside and Verbal shift change report given to Joe Hyman (oncoming nurse) by Shabbir Hair (offgoing nurse). Report included the following information SBAR, Kardex, OR Summary, Intake/Output, MAR and Recent Results.

## 2019-06-12 NOTE — PROGRESS NOTES
Problem: Mobility Impaired (Adult and Pediatric)  Goal: *Acute Goals and Plan of Care (Insert Text)  Description  Physical Therapy Goals  Initiated 6/11/2019 and to be accomplished within 7 day(s)  1. Patient will move from supine to sit and sit to supine  in bed with modified independence. 2.  Patient will transfer from bed to chair and chair to bed with modified independence using the least restrictive device. 3.  Patient will perform sit to stand with modified independence. 4.  Patient will ambulate with modified independence for 150 feet with the least restrictive device. 5.  Patient will ascend/descend 10 stairs with handrail(s) with supervision/set-up. Outcome: Progressing Towards Goal     PHYSICAL THERAPY TREATMENT    Patient: Kaia Pimentel (74 y.o. female)  Date: 6/12/2019  Diagnosis: Right knee DJD [M17.11] Osteoarthritis of knee  Procedure(s) (LRB):  right total KNEE ARTHROPLASTY with prp with femoral (Right) 2 Days Post-Op  Precautions: WBAT, Fall  WBAT RLE   PLOF: Mod I    ASSESSMENT:  Agreeable to OOB to chair with encouragement. Per CARRILLO Sutherland knee immobilizer to be removed today. Min A for supine to sit. Good sitting balance. Supervision for sit to stand. Supervision for bed to chair with ww. Increased pain with WB RLE; declined further mobility. Education provided on bed mobility, transfers, ADLs, balance, amb, safety, exercise, role of PT, plan of care, cognition, skin integrity, vitals as indicated. Educated on need for RN assistance with mobility; verbalized understanding. Call bell in reach. Progression toward goals:   ?      Improving appropriately and progressing toward goals  ? Improving slowly and progressing toward goals  ? Not making progress toward goals and plan of care will be adjusted     PLAN:  Patient continues to benefit from skilled intervention to address the above impairments. Continue treatment per established plan of care.   Discharge Recommendations:  Home Health  Further Equipment Recommendations for Discharge:  rolling walker     SUBJECTIVE:   Patient stated ? I can't even lift it today. ?    OBJECTIVE DATA SUMMARY:   Critical Behavior:  Neurologic State: Alert  Orientation Level: Oriented X4  Cognition: Follows commands  Safety/Judgement: Fall prevention  Functional Mobility Training:  Bed Mobility:  Supine to Sit: Minimum assistance  Transfers:  Sit to Stand: Supervision  Stand to Sit: Supervision  Bed to Chair: Supervision  Balance:  Sitting: Impaired  Sitting - Static: Good (unsupported)  Sitting - Dynamic: Good (unsupported)  Standing: Impaired  Standing - Static: Good  Standing - Dynamic : Fair   Ambulation/Gait Training:  Declines d/t pain  Pain:  Pain level pre-treatment: 6/10  Pain level post-treatment: 6/10   Pain Scale 1: Numeric (0 - 10)    Activity Tolerance:   Fair    After treatment:   ? Patient left in no apparent distress sitting up in chair  ? Patient left in no apparent distress in bed  ? Call bell left within reach  ? Nursing notified  ? Caregiver present  ? Bed alarm activated  ? SCDs applied      COMMUNICATION/EDUCATION:   ?         Role of physical therapy in the acute care setting. ?         Fall prevention education was provided and the patient/caregiver indicated understanding. ? Patient/family have participated as able in working toward goals and plan of care. ?         Patient/family agree to work toward stated goals and plan of care. ?         Patient understands intent and goals of therapy, but is neutral about his/her participation. ? Patient is unable to participate in stated goals/plan of care: ongoing with therapy staff.       Melanie Elliott, PT   Time Calculation: 18 mins

## 2019-06-12 NOTE — PROGRESS NOTES
0730 Received pt in bed, dressing c/d/i with immobilizer, SCD on opposite leg. Able to wiggle toes without difficulty, no numbness or tingling. Pt on O2 at 2L/min, does not use CPAP, CPAP at bedside. IV site no sign of infiltration. Pt voids to bedside commode. Per pt she does not want tot take pain med until t reached 8/10, explained topt it has to be controlled to her tolerable level that way we don't have to give IV pain meds frequently as she is going home only with PO, verbalized understanding. Call bell within reach. 1540 To recliner with PT    2345 PA and ortho coordinator to pt room. Immobilizer off, put back when in bed.    0910 To commode from recliner with this RN, tolerated fairly. 1357 Pt back to bed. [phone] 63 50 with , pt hard stick, unable to draw blood. Will wait for  coming at 1800.    1840 Pain under control with Percocet. Ambulated in the room with PT, then to bedside commode several times to void using walker and standby assist. Dressing change done this morning with sterile technique, incision site looks c/d/i. Can wiggle toes, palpable pulse, no numbness/tingling. Immobilizer on when in bed, no pillow under the knee. Endorsed pending CBC to incoming RN.

## 2019-06-12 NOTE — PROGRESS NOTES
Problem: Mobility Impaired (Adult and Pediatric)  Goal: *Acute Goals and Plan of Care (Insert Text)  Description  Physical Therapy Goals  Initiated 6/11/2019 and to be accomplished within 7 day(s)  1. Patient will move from supine to sit and sit to supine  in bed with modified independence. 2.  Patient will transfer from bed to chair and chair to bed with modified independence using the least restrictive device. 3.  Patient will perform sit to stand with modified independence. 4.  Patient will ambulate with modified independence for 150 feet with the least restrictive device. 5.  Patient will ascend/descend 10 stairs with handrail(s) with supervision/set-up. 6/12/2019 1506 by Radha Segura, PT  Outcome: Progressing Towards Goal    PHYSICAL THERAPY TREATMENT    Patient: Sin Oh (74 y.o. female)  Date: 6/12/2019  Diagnosis: Right knee DJD [M17.11] Osteoarthritis of knee  Procedure(s) (LRB):  right total KNEE ARTHROPLASTY with prp with femoral (Right) 2 Days Post-Op  Precautions: WBAT, Fall  WBAT RLE  PLOF:  Mod I    ASSESSMENT:  Seated in chair upon entry. Supervision for sit to stand and transfer to bedside commode. Good standing balance at ww for clean up post. Supervision for amb 5ft with ww from bedside commode to bed. Complaints of pain limiting amb. Min A for sit to supine transfer. Supervision to scoot up in bed. Education provided on bed mobility, transfers, ADLs, balance, amb, safety, exercise, role of PT, plan of care, cognition, skin integrity, vitals as indicated. Educated on need for RN assistance with mobility; verbalized understanding. Call bell in reach. Progression toward goals:   ?      Improving appropriately and progressing toward goals  ? Improving slowly and progressing toward goals  ? Not making progress toward goals and plan of care will be adjusted     PLAN:  Patient continues to benefit from skilled intervention to address the above impairments. Continue treatment per established plan of care. Discharge Recommendations:  Home Health  Further Equipment Recommendations for Discharge:  rolling walker     SUBJECTIVE:   Patient stated ? You girls. ?    OBJECTIVE DATA SUMMARY:   Critical Behavior:  Neurologic State: Alert  Orientation Level: Oriented X4  Cognition: Follows commands  Safety/Judgement: Fall prevention  Functional Mobility Training:  Bed Mobility:  Supine to Sit: Minimum assistance  Sit to Supine: Minimum assistance  Scooting: Supervision  Transfers:  Sit to Stand: Supervision  Stand to Sit: Supervision  Bed to Chair: Supervision  Balance:  Sitting: Intact  Sitting - Static: Good (unsupported)  Sitting - Dynamic: Good (unsupported)  Standing: Impaired  Standing - Static: Good  Standing - Dynamic : Good   Ambulation/Gait Training:  Distance (ft): 5 Feet (ft)  Assistive Device: Walker, rolling  Ambulation - Level of Assistance: Supervision  Neuro Re-Education:  Standing balance 2 minutes  Therapeutic Exercises:   Sit to stand x2    Pain:  Pain level pre-treatment: 7/10  Pain level post-treatment: 7/10   Pain Scale 1: Numeric (0 - 10)    Activity Tolerance:   Fair    After treatment:   ? Patient left in no apparent distress sitting up in chair  ? Patient left in no apparent distress in bed  ? Call bell left within reach  ? Nursing notified  ? Caregiver present  ? Bed alarm activated  ? SCDs applied      COMMUNICATION/EDUCATION:   ?         Role of physical therapy in the acute care setting. ?         Fall prevention education was provided and the patient/caregiver indicated understanding. ? Patient/family have participated as able in working toward goals and plan of care. ?         Patient/family agree to work toward stated goals and plan of care. ?         Patient understands intent and goals of therapy, but is neutral about his/her participation. ?          Patient is unable to participate in stated goals/plan of care: ongoing with therapy staff.       Jaspreet Marc, PT   Time Calculation: 18 mins

## 2019-06-12 NOTE — PROGRESS NOTES
Pt did 100 ft yesterday with therapy and only 5ft today. Talked to her about poss snf at [facility], if ok with md. She agrees. Posted to aziza at [employer], they can accept Thursday if md agrees. Plan changed to snf at [facility].

## 2019-06-12 NOTE — PROGRESS NOTES
Ortho rounds complete with Coordinator and PA at bedside. Awake/alert and orientated, sitting up in recline chair at present time. No acute distress noted, no  C/o of pain. Dressing intact with immobilizer in place. Plan of care discussed, all questions answered, verbalized understanding. Safety measures remain in place, call bell in reach. Primary nurse updated on plan. OT orders placed, dressing change per MD orders.

## 2019-06-12 NOTE — PROGRESS NOTES
Internal Medicine Progress Note    Patient's Name: Georgette Eubanks  Admit Date: 6/10/2019  Length of Stay: 2      Assessment/Plan     Active Hospital Problems    Diagnosis Date Noted    Osteoarthritis of knee 06/10/2019    Right knee DJD 06/10/2019    Obesity, morbid ([facility] [address] 11/14/2018    Essential hypertension 12/31/2016    Type 2 diabetes mellitus without complication ([facility] [address] 19/19/4545    Obstructive sleep apnea 01/05/2016     Overview:   1/20/2016 AHI/RDI: 6                   REM AHI: 27               SUPINE AHI: 10 SpO2 eli: 81% PLMI: 4      PAP titration Date: 3/15/2016  CPAP11 cm H2O (AHI: 0; SpO2 eli: 93%)  CPAP: 11 cm H2O PLMI: 3    CPAP 11  DME: [facility]       - Diet and mobilization per primary team  - Pain control PRN  - PT/OT  - BP in approp range  - Cont CPAP qhs  - SSI and accuchecks  - Cont acceptable home medications for chronic conditions   - DVT protocol    I have personally reviewed all pertinent labs and films that have officially resulted over the last 24 hours. I have personally checked for all pending labs that are awaiting final results.     Subjective     Pt s/e @ bedside  No major events overnight  Pain increases post PT  Otherwise, doing well  Denies CP or SOB    Objective     Visit Vitals  /67 (BP 1 Location: Left arm, BP Patient Position: At rest)   Pulse 81   Temp 98.1 °F (36.7 °C)   Resp 16   Ht 4' 9\" (1.448 m)   Wt 88.6 kg (195 lb 7 oz)   SpO2 100%   BMI 42.29 kg/m²       Physical Exam:  General Appearance: NAD, conversant  Lungs: CTA with normal respiratory effort  CV: RRR, no m/r/g  Abdomen: soft, non-tender, normal bowel sounds  Extremities: no cyanosis, no peripheral edema, RLE dressing in place  Neuro: No focal deficits, motor/sensory intact    Lab/Data Reviewed:  BMP:   Lab Results   Component Value Date/Time     06/12/2019 05:15 AM    K 4.5 06/12/2019 05:15 AM     (H) 06/12/2019 05:15 AM    CO2 31 06/12/2019 05:15 AM    AGAP 3 06/12/2019 05:15 AM     (H) 06/12/2019 05:15 AM    BUN 27 (H) 06/12/2019 05:15 AM    CREA 1.03 06/12/2019 05:15 AM    GFRAA >60 06/12/2019 05:15 AM    GFRNA 53 (L) 06/12/2019 05:15 AM     CBC:   Lab Results   Component Value Date/Time    HGB 7.6 (L) 06/12/2019 05:15 AM    HCT 23.6 (L) 06/12/2019 05:15 AM       Imaging Reviewed:  No results found.     Medications Reviewed:  Current Facility-Administered Medications   Medication Dose Route Frequency    calcium-vitamin D 600 mg(1,500mg) -200 unit per tablet 1 Tab  1 Tab Oral DAILY    cholecalciferol (VITAMIN D3) tablet 1,000 Units  1,000 Units Oral DAILY    latanoprost (XALATAN) 0.005 % ophthalmic solution 1 Drop  1 Drop Right Eye QHS    losartan (COZAAR) tablet 50 mg  50 mg Oral DAILY    hydroCHLOROthiazide (HYDRODIURIL) tablet 12.5 mg  12.5 mg Oral DAILY    sodium chloride (NS) flush 5-40 mL  5-40 mL IntraVENous Q8H    sodium chloride (NS) flush 5-40 mL  5-40 mL IntraVENous PRN    oxyCODONE-acetaminophen (PERCOCET) 5-325 mg per tablet 2 Tab  2 Tab Oral Q4H PRN    HYDROcodone-acetaminophen (NORCO)  mg tablet 2 Tab  2 Tab Oral Q4H PRN    morphine injection 2 mg  2 mg IntraVENous Q4H PRN    naloxone (NARCAN) injection 0.4 mg  0.4 mg IntraVENous PRN    ondansetron (ZOFRAN) injection 4 mg  4 mg IntraVENous Q4H PRN    bisacodyl (DULCOLAX) suppository 10 mg  10 mg Rectal DAILY PRN    enoxaparin (LOVENOX) injection 30 mg  30 mg SubCUTAneous Q12H           Levi Kinney DO  Internal Medicine, Hospitalist  Pager: [phone]  [address] [place] [address]

## 2019-06-12 NOTE — PROGRESS NOTES
Progress Note      Post Operative Day: 2    Assessment:    1. Status post  TOTAL KNEE ARTHROPLASTY [77371] (KNEE ARTHROPLASTY TOTAL) for Right knee DJD [M17.11] 6/10/2019,  Progressing. 2. Anemia - will repeat CBC at 1800 today. Transfusion if less than 7.0; unless IM disagrees. Patient asymptomatic     PLAN:    1. Mobilize. Continue P.T.  2. WBAT  3. Lovenox for DVT prophylaxis  4. Discharge Planning. HPI: Yumiko Conrad is a 68 y.o. female patient without new complaints. No new orthopaedic changes. Blood pressure 108/67, pulse 81, temperature 98.1 °F (36.7 °C), resp. rate 16, height 4' 9\" (1.448 m), weight 88.6 kg (195 lb 7 oz), SpO2 100 %. CBC w/Diff   Lab Results   Component Value Date/Time    WBC 7.0 05/29/2019 08:56 AM    RBC 4.45 05/29/2019 08:56 AM    HGB 7.6 (L) 06/12/2019 05:15 AM    HCT 23.6 (L) 06/12/2019 05:15 AM    MCV 84.0 05/29/2019 08:56 AM    MCH 26.5 05/29/2019 08:56 AM    MCHC 31.6 05/29/2019 08:56 AM    RDW 15.3 (H) 05/29/2019 08:56 AM    Lab Results   Component Value Date/Time    MONOS 7 05/29/2019 08:56 AM    EOS 2 05/29/2019 08:56 AM    BASOS 0 05/29/2019 08:56 AM    RDW 15.3 (H) 05/29/2019 08:56 AM             Physical Assessment:  General: in no apparent distress, well developed and well nourished, non-toxic, in no respiratory distress and acyanotic, alert and oriented times 3   Wound: no drainage   Extremities:  Neurovascular intact RLE. Compartments soft and NT distal to surgical site. Able to contract quadriceps. Plantar and dorsiflexion intact. Palpable DP/PT pulses noted. Denies paresthesias    Dressing:  CDI   DVT Exam:   No exam evidence to suggest DVT. Compartments soft and NT.           Radiology: no new ortho studies          - Eileen Frances, [address] [place] [address], NATALEE  6/12/2019    Office [phone]

## 2019-06-12 NOTE — PROGRESS NOTES
Problem: Self Care Deficits Care Plan (Adult)  Goal: *Acute Goals and Plan of Care (Insert Text)  Description  Occupational Therapy Goals  Initiated 6/12/2019 within 7 day(s). 1.  Patient will perform LB bathing/dressing w/ AE PRN & supervision. 2.  Patient will perform grooming in standing for 8 minutes w/ 2 rest breaks & supervision to promote dynamic standing tolerance. 3.  Patient will perform toilet transfers with supervision using LRAD. 4.  Patient will perform all aspects of toileting with supervision. 5.  Patient will utilize energy conservation techniques during functional activities with minimal cues. Outcome: Progressing Towards Goal   OCCUPATIONAL THERAPY EVALUATION    Patient: Silverio Mixon (74 y.o. female)  Date: 6/12/2019  Primary Diagnosis: Right knee DJD [M17.11]  Procedure(s) (LRB):  right total KNEE ARTHROPLASTY with prp with femoral (Right) 2 Days Post-Op   Precautions:  WBAT, Fall  PLOF: Pt reports being independent w/ ADLs. ASSESSMENT :  Based on the objective data described below, the patient presents with decreased ADL & mobility participation secondary to right total knee arthroplasty. Upon entering room pt seated in chair; agreeable to OT eval. Pt performed sit to stand from chair w/ CGA, used RW to transfer to [facility] w/ CGA & additional time. Pt performed toilet hygiene w/ CGA & returned to chair. Provided Vcs for safe hand placement during transfers & transitions. Provided pt home safety sheet. Pt left in chair & needs within reach. Pt reports 5/10 pain pre session & 4/10 pain post session in (R) knee. Patient will benefit from skilled intervention to address the above impairments. Patient's rehabilitation potential is considered to be Fair  Factors which may influence rehabilitation potential include:   ? None noted  ? Mental ability/status  ? Medical condition  ? Home/family situation and support systems  ? Safety awareness  ? Pain tolerance/management  ? Other:      PLAN :  Recommendations and Planned Interventions:   ?               Self Care Training                  ? Therapeutic Activities  ? Functional Mobility Training   ? Cognitive Retraining  ? Therapeutic Exercises           ? Endurance Activities  ? Balance Training                    ? Neuromuscular Re-Education  ? Visual/Perceptual Training     ? Home Safety Training  ? Patient Education                   ? Family Training/Education  ? Other (comment):    Frequency/Duration: Patient will be followed by occupational therapy 3-5 times a week to address goals. Discharge Recommendations: Home Health  Further Equipment Recommendations for Discharge: anticipate none. SUBJECTIVE:   Patient stated i'm ok mama. 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     Hypertension     Morbid obesity ([facility] [address]     Sleep apnea     CPAP machine    Use of cane as ambulatory aid      Past Surgical History:   Procedure Laterality Date    HX CATARACT REMOVAL Bilateral     HX  SECTION          HX COLONOSCOPY      HX HYSTERECTOMY      HX NEPHRECTOMY Right     HX ROTATOR CUFF REPAIR Right     shoulder surgery     Barriers to Learning/Limitations: None  Compensate with: visual, verbal, tactile, kinesthetic cues/model    Home Situation:   Home Situation  Home Environment: Private residence  # Steps to Enter: 0  One/Two Story Residence: Two story  Living Alone: No  Support Systems: Family member(s)  Patient Expects to be Discharged to[de-identified] Private residence  Current DME Used/Available at Home: Grab bars, Shower chair  Tub or Shower Type: Tub/Shower combination  ?   Right hand dominant ?  Left hand dominant    Cognitive/Behavioral Status:  Neurologic State: Alert  Orientation Level: Oriented X4  Cognition: Follows commands  Safety/Judgement: Fall prevention    Skin: Intact. Edema: (R)LE    Balance:  Sitting: Impaired  Sitting - Static: Good (unsupported)  Sitting - Dynamic: Good (unsupported)  Standing: Impaired  Standing - Static: Fair  Standing - Dynamic : Fair    Strength: BUE  Strength: Within functional limits as observed by functional transfers. Range of Motion: BUE  AROM: Within functional limits as observed by functional transfers. Functional Mobility and Transfers for ADLs:  Bed Mobility:  N/t pt in chair pre & post session. Transfers:  Sit to Stand: Contact guard assistance  Stand to Sit: Contact guard assistance   Toilet Transfer : Contact guard assistance        ADL Assessment:   Feeding: Modified independent  Oral Facial Hygiene/Grooming: Setup  Bathing: Moderate assistance  Upper Body Dressing: Setup  Lower Body Dressing: Maximum assistance  Toileting: Contact guard assistance     ADL Intervention:   Pt performed toileting & hygiene to [facility] w/ CGA for functional transfer using RW & CGA. Toileting  Toileting Assistance: Contact guard assistance  Bladder Hygiene: Contact guard assistance  Clothing Management: Contact guard assistance    Cognitive Retraining  Safety/Judgement: Fall prevention    Therapeutic Activity:  Pt performed functional transfer from chair <> [facility] w/ CGA using RW. Pain:  Pain level pre-treatment: 5/10   Pain level post-treatment: 4/10   Pain Intervention(s): Repositioning     Activity Tolerance:   Fair  Please refer to the flowsheet for vital signs taken during this treatment. After treatment:   ? Patient left in no apparent distress sitting up in chair  ? Patient left in no apparent distress in bed  ? Call bell left within reach  ? Nursing notified  ? Caregiver present  ? Bed alarm activated    COMMUNICATION/EDUCATION: Provided pt home safety sheet. ? Role of Occupational Therapy in the acute care setting  ? Home safety education was provided and the patient/caregiver indicated understanding. ? Patient/family have participated as able in goal setting and plan of care. ? Patient/family agree to work toward stated goals and plan of care. ? Patient understands intent and goals of therapy, but is neutral about his/her participation. ? Patient is unable to participate in goal setting and plan of care. Thank you for this referral.  Luis Fernando Mixon  Time Calculation: 22 mins    Eval Complexity: History: MEDIUM Complexity : Expanded review of history including physical, cognitive and psychosocial  history ; Examination: MEDIUM Complexity : 3-5 performance deficits relating to physical, cognitive , or psychosocial skils that result in activity limitations and / or participation restrictions; Decision Making:MEDIUM Complexity : Patient may present with comorbidities that affect occupational performnce.  Miniml to moderate modification of tasks or assistance (eg, physical or verbal ) with assesment(s) is necessary to enable patient to complete evaluation

## 2019-06-13 ENCOUNTER — HOSPITAL ENCOUNTER (INPATIENT)
Age: 74
LOS: 16 days | Discharge: HOME HEALTH | End: 2019-06-29
Attending: INTERNAL MEDICINE | Admitting: INTERNAL MEDICINE

## 2019-06-13 VITALS
TEMPERATURE: 100.1 F | SYSTOLIC BLOOD PRESSURE: 103 MMHG | HEIGHT: 57 IN | DIASTOLIC BLOOD PRESSURE: 60 MMHG | OXYGEN SATURATION: 100 % | RESPIRATION RATE: 20 BRPM | WEIGHT: 195.44 LBS | HEART RATE: 77 BPM | BODY MASS INDEX: 42.16 KG/M2

## 2019-06-13 DIAGNOSIS — M17.9 OSTEOARTHRITIS OF KNEE, UNSPECIFIED LATERALITY, UNSPECIFIED OSTEOARTHRITIS TYPE: ICD-10-CM

## 2019-06-13 DIAGNOSIS — M17.0 TRICOMPARTMENT OSTEOARTHRITIS OF KNEES, BILATERAL: Primary | ICD-10-CM

## 2019-06-13 DIAGNOSIS — M17.11 OSTEOARTHRITIS OF RIGHT KNEE, UNSPECIFIED OSTEOARTHRITIS TYPE: ICD-10-CM

## 2019-06-13 PROBLEM — D62 ACUTE BLOOD LOSS AS CAUSE OF POSTOPERATIVE ANEMIA: Status: ACTIVE | Noted: 2019-06-13

## 2019-06-13 PROCEDURE — 74011250637 HC RX REV CODE- 250/637: Performed by: INTERNAL MEDICINE

## 2019-06-13 PROCEDURE — 77030012893

## 2019-06-13 PROCEDURE — 74011250637 HC RX REV CODE- 250/637: Performed by: ORTHOPAEDIC SURGERY

## 2019-06-13 PROCEDURE — 74011000250 HC RX REV CODE- 250: Performed by: INTERNAL MEDICINE

## 2019-06-13 PROCEDURE — 74011250636 HC RX REV CODE- 250/636: Performed by: ORTHOPAEDIC SURGERY

## 2019-06-13 PROCEDURE — 74011250636 HC RX REV CODE- 250/636: Performed by: INTERNAL MEDICINE

## 2019-06-13 PROCEDURE — 97530 THERAPEUTIC ACTIVITIES: CPT

## 2019-06-13 PROCEDURE — 97535 SELF CARE MNGMENT TRAINING: CPT

## 2019-06-13 PROCEDURE — 97110 THERAPEUTIC EXERCISES: CPT

## 2019-06-13 RX ORDER — DICLOFENAC SODIUM 10 MG/G
2 GEL TOPICAL
Status: DISCONTINUED | OUTPATIENT
Start: 2019-06-13 | End: 2019-06-29 | Stop reason: HOSPADM

## 2019-06-13 RX ORDER — ENOXAPARIN SODIUM 100 MG/ML
30 INJECTION SUBCUTANEOUS EVERY 12 HOURS
Qty: 14 SYRINGE | Refills: 0 | Status: SHIPPED | OUTPATIENT
Start: 2019-06-13 | End: 2019-06-29

## 2019-06-13 RX ORDER — HYDROCHLOROTHIAZIDE 25 MG/1
12.5 TABLET ORAL DAILY
Status: DISCONTINUED | OUTPATIENT
Start: 2019-06-14 | End: 2019-06-29 | Stop reason: HOSPADM

## 2019-06-13 RX ORDER — HYDROCODONE BITARTRATE AND ACETAMINOPHEN 10; 325 MG/1; MG/1
1 TABLET ORAL
Status: DISCONTINUED | OUTPATIENT
Start: 2019-06-13 | End: 2019-06-29 | Stop reason: HOSPADM

## 2019-06-13 RX ORDER — NALOXONE HYDROCHLORIDE 0.4 MG/ML
0.4 INJECTION, SOLUTION INTRAMUSCULAR; INTRAVENOUS; SUBCUTANEOUS AS NEEDED
Status: DISCONTINUED | OUTPATIENT
Start: 2019-06-13 | End: 2019-06-29 | Stop reason: HOSPADM

## 2019-06-13 RX ORDER — LATANOPROST 50 UG/ML
1 SOLUTION/ DROPS OPHTHALMIC EVERY EVENING
Status: DISCONTINUED | OUTPATIENT
Start: 2019-06-13 | End: 2019-06-14

## 2019-06-13 RX ORDER — LOSARTAN POTASSIUM 50 MG/1
50 TABLET ORAL DAILY
Status: DISCONTINUED | OUTPATIENT
Start: 2019-06-14 | End: 2019-06-29 | Stop reason: HOSPADM

## 2019-06-13 RX ORDER — HYDROCODONE BITARTRATE AND ACETAMINOPHEN 10; 325 MG/1; MG/1
2 TABLET ORAL
Status: DISCONTINUED | OUTPATIENT
Start: 2019-06-13 | End: 2019-06-29 | Stop reason: HOSPADM

## 2019-06-13 RX ORDER — NALOXONE HYDROCHLORIDE 4 MG/.1ML
SPRAY NASAL
Qty: 1 EACH | Refills: 0 | Status: SHIPPED | OUTPATIENT
Start: 2019-06-13 | End: 2019-06-29

## 2019-06-13 RX ORDER — MELATONIN
1000 DAILY
Status: DISCONTINUED | OUTPATIENT
Start: 2019-06-14 | End: 2019-06-29 | Stop reason: HOSPADM

## 2019-06-13 RX ORDER — ENOXAPARIN SODIUM 100 MG/ML
30 INJECTION SUBCUTANEOUS EVERY 12 HOURS
Status: DISCONTINUED | OUTPATIENT
Start: 2019-06-13 | End: 2019-06-19

## 2019-06-13 RX ORDER — HYDROCODONE BITARTRATE AND ACETAMINOPHEN 10; 325 MG/1; MG/1
1-2 TABLET ORAL
Qty: 40 TAB | Refills: 0 | Status: SHIPPED | OUTPATIENT
Start: 2019-06-13 | End: 2019-06-29

## 2019-06-13 RX ORDER — CALCIUM CARBONATE/VITAMIN D3 600MG-5MCG
1 TABLET ORAL DAILY
Status: DISCONTINUED | OUTPATIENT
Start: 2019-06-14 | End: 2019-06-29 | Stop reason: HOSPADM

## 2019-06-13 RX ADMIN — HYDROCHLOROTHIAZIDE 12.5 MG: 25 TABLET ORAL at 09:49

## 2019-06-13 RX ADMIN — ENOXAPARIN SODIUM 30 MG: 30 INJECTION, SOLUTION INTRAVENOUS; SUBCUTANEOUS at 22:46

## 2019-06-13 RX ADMIN — Medication 10 ML: at 06:09

## 2019-06-13 RX ADMIN — OXYCODONE HYDROCHLORIDE AND ACETAMINOPHEN 2 TABLET: 5; 325 TABLET ORAL at 04:40

## 2019-06-13 RX ADMIN — ENOXAPARIN SODIUM 30 MG: 100 INJECTION SUBCUTANEOUS at 01:05

## 2019-06-13 RX ADMIN — Medication 1 TABLET: at 09:49

## 2019-06-13 RX ADMIN — HYDROCODONE BITARTRATE AND ACETAMINOPHEN 2 TABLET: 10; 325 TABLET ORAL at 09:49

## 2019-06-13 RX ADMIN — VITAMIN D, TAB 1000IU (100/BT) 1000 UNITS: 25 TAB at 09:49

## 2019-06-13 RX ADMIN — LOSARTAN POTASSIUM 50 MG: 50 TABLET ORAL at 09:49

## 2019-06-13 RX ADMIN — ENOXAPARIN SODIUM 30 MG: 100 INJECTION SUBCUTANEOUS at 14:02

## 2019-06-13 RX ADMIN — HYDROCODONE BITARTRATE AND ACETAMINOPHEN 2 TABLET: 10; 325 TABLET ORAL at 22:47

## 2019-06-13 RX ADMIN — LATANOPROST 1 DROP: 50 SOLUTION OPHTHALMIC at 18:00

## 2019-06-13 RX ADMIN — HYDROCODONE BITARTRATE AND ACETAMINOPHEN 2 TABLET: 10; 325 TABLET ORAL at 18:55

## 2019-06-13 NOTE — ANCILLARY DISCHARGE INSTRUCTIONS
Patient and/or next of kin has been given the [facility] Important Message From Medicare About Your Rights\" letter and all questions were answered. Paper copy signed.

## 2019-06-13 NOTE — PROGRESS NOTES
Problem: Mobility Impaired (Adult and Pediatric)  Goal: *Acute Goals and Plan of Care (Insert Text)  Description  Physical Therapy Goals  Initiated 6/11/2019 and to be accomplished within 7 day(s)  1. Patient will move from supine to sit and sit to supine  in bed with modified independence. 2.  Patient will transfer from bed to chair and chair to bed with modified independence using the least restrictive device. 3.  Patient will perform sit to stand with modified independence. 4.  Patient will ambulate with modified independence for 150 feet with the least restrictive device. 5.  Patient will ascend/descend 10 stairs with handrail(s) with supervision/set-up. Outcome: Progressing Towards Goal   PHYSICAL THERAPY TREATMENT    Patient: Christian Alcantar (74 y.o. female)  Date: 6/13/2019  Diagnosis: Right knee DJD [M17.11] Osteoarthritis of knee  Procedure(s) (LRB):  right total KNEE ARTHROPLASTY with prp with femoral (Right) 3 Days Post-Op  Precautions: WBAT, Fall  PLOF: mod I    ASSESSMENT:  Pt limited by pain, instructed in strategies for bed mobility, verbal and tactile cues for hand placement on RW during transfers, instructed pt in LE exercises for strength and ROM to improve functional mobility, educated on importance of compliance with exercise program to maximize functional outcomes. Progression toward goals:   ?      Improving appropriately and progressing toward goals  ? Improving slowly and progressing toward goals  ? Not making progress toward goals      PLAN:  Patient continues to benefit from skilled intervention to address the above impairments. Continue treatment per established plan of care. Discharge Recommendations:  [facility]  Further Equipment Recommendations for Discharge:  TBD at next level of care     SUBJECTIVE:   Patient stated ? It hurts. ?    OBJECTIVE DATA SUMMARY:   Critical Behavior:  Neurologic State: Alert  Orientation Level: Oriented X4  Cognition: Follows commands, Appropriate decision making, Appropriate for age attention/concentration, Appropriate safety awareness  Safety/Judgement: Fall prevention  Functional Mobility Training:  Bed Mobility:  Supine to Sit: Minimum assistance  Scooting: Contact guard assistance  Transfers:  Sit to Stand: Stand-by assistance  Stand to Sit: Stand-by assistance  Balance:  Sitting: Intact  Standing: Impaired  Standing - Static: Good  Standing - Dynamic : Good     Ambulation/Gait Training:  Distance (ft): 3 Feet (ft)(X2)  Assistive Device: Walker, rolling  Ambulation - Level of Assistance: Stand-by assistance  Therapeutic Exercises:         EXERCISE   Sets   Reps   Active Active Assist   Passive Self ROM   Comments   Ankle Pumps 1 10  ? ? ? ?    Quad Sets/Glut Sets 1 10  ? ? ? ? Hamstring Sets   ? ? ? ? Short Arc Quads   ? ? ? ? Heel Slides 1 10 ? ? ? ? Straight Leg Raises   ? ? ? ? Hip Add 1 10 ? ? ? ? Long Arc Quads   ? ? ? ? Seated Marching   ? ? ? ? Standing Marching   ? ? ? ?       ? ? ? ? Pain:  Pain level pre-treatment: 6/10  Pain level post-treatment: 6/10   Pain Intervention(s): ice pack      Activity Tolerance:   Poor, limited by pain tolerance  Please refer to the flowsheet for vital signs taken during this treatment. After treatment:   ? Patient left in no apparent distress sitting up in chair  ? Patient left in no apparent distress in bed  ? Call bell left within reach  ? Nursing notified  ? Caregiver present  ? Bed alarm activated  ? SCDs applied      COMMUNICATION/EDUCATION:   ?           ? Fall prevention education was provided and the patient/caregiver indicated understanding. ? Patient/family have participated as able in working toward goals and plan of care. ?         Patient/family agree to work toward stated goals and plan of care. ?         Patient understands intent and goals of therapy, but is neutral about his/her participation. ? Patient is unable to participate in stated goals/plan of care: ongoing with therapy staff. ?         Role of Physical Therapy in the acute care setting.         Carlos Pineda PTA   Time Calculation: 23 mins

## 2019-06-13 NOTE — PROGRESS NOTES
Problem: Falls - Risk of  Goal: *Absence of Falls  Description  Document Berenice Asif Fall Risk and appropriate interventions in the flowsheet.   Outcome: Progressing Towards Goal     Problem: Pain  Goal: *Control of Pain  Outcome: Progressing Towards Goal     Problem: Infection - Risk of, Surgical Site Infection  Goal: *Absence of surgical site infection signs and symptoms  Outcome: Progressing Towards Goal

## 2019-06-13 NOTE — PROGRESS NOTES
Problem: Self Care Deficits Care Plan (Adult)  Goal: *Acute Goals and Plan of Care (Insert Text)  Description  Occupational Therapy Goals  Initiated 6/12/2019 within 7 day(s). 1.  Patient will perform LB bathing/dressing w/ AE PRN & supervision. 2.  Patient will perform grooming in standing for 8 minutes w/ 2 rest breaks & supervision to promote dynamic standing tolerance. 3.  Patient will perform toilet transfers with supervision using LRAD. 4.  Patient will perform all aspects of toileting with supervision. 5.  Patient will utilize energy conservation techniques during functional activities with minimal cues. Outcome: Progressing Towards Goal   OCCUPATIONAL THERAPY TREATMENT    Patient: Melissa Ayala (74 y.o. female)  Date: 6/13/2019  Diagnosis: Right knee DJD [M17.11] Osteoarthritis of knee  Procedure(s) (LRB):  right total KNEE ARTHROPLASTY with prp with femoral (Right) 3 Days Post-Op  Precautions: WBAT, Fall  PLOF: Independent    Chart, occupational therapy assessment, plan of care, and goals were reviewed. ASSESSMENT:  Pt OOB seated in chair upon entry. Pt request use bathroom. Pt c/o R knee pain requires Min Assist w/functional transfer to standing and CGA w/functional transfer to [facility]. Decrease dynamic standing balance requires assist w/clothing mgt. Pt educated on compensatory strategy w/one hand on RW for increase dynamic standing balance w/clothing mgt. Pt educated on energy conservation techniques w/ADLs in prep for modified ADL at chair level w/set-up. Progression toward goals:  ?          Improving appropriately and progressing toward goals  ? Improving slowly and progressing toward goals  ? Not making progress toward goals and plan of care will be adjusted     PLAN:  Patient continues to benefit from skilled intervention to address the above impairments. Continue treatment per established plan of care.   Discharge Recommendations:  Skilled Nursing Facility  Further Equipment Recommendations for Discharge:  shower chair     SUBJECTIVE:   Patient stated ? I feel so much better. Thank you. ? s/p ADL     OBJECTIVE DATA SUMMARY:   Cognitive/Behavioral Status:  Neurologic State: Alert  Orientation Level: Oriented X4  Cognition: Follows commands  Safety/Judgement: Fall prevention    Functional Mobility and Transfers for ADLs:   Transfers:  Sit to Stand: Minimum assistance(w/RW)  Bed to Chair: Contact guard assistance(w/RW)   Toilet Transfer : Additional time;Contact guard assistance(chair <--> BSC w/RW)   Balance:  Sitting: Intact  Standing: Impaired; With support  Standing - Static: Fair  Standing - Dynamic : Fair    ADL Intervention:  Grooming  Washing Face: Supervision  Washing Hands: Supervision  Brushing Teeth: Supervision    Upper Body Bathing  Bathing Assistance: Stand-by assistance  Position Performed: Seated in chair    [address]: Moderate assistance  Bladder Hygiene: Contact guard assistance  Clothing Management: Maximum assistance    Pain:  Pain level pre-treatment: 7/10   Pain level post-treatment: 4/10  Pain Intervention(s): Medication (see MAR)    Activity Tolerance:    Good    Please refer to the flowsheet for vital signs taken during this treatment. After treatment:   ?  Patient left in no apparent distress sitting up in chair  ? Patient left in no apparent distress in bed  ? Call bell left within reach  ? Nursing notified  ? Caregiver present  ? Bed alarm activated    COMMUNICATION/EDUCATION:   ? Role of Occupational Therapy in the acute care setting  ? Home safety education was provided and the patient/caregiver indicated understanding. ? Patient/family have participated as able in working towards goals and plan of care. ? Patient/family agree to work toward stated goals and plan of care.   ? Patient understands intent and goals of therapy, but is neutral about his/her participation. ? Patient is unable to participate in goal setting and plan of care.       Thank you for this referral.  SERG Garcia  Time Calculation: 26 mins

## 2019-06-13 NOTE — PROGRESS NOTES
Physical Exam   Skin:         Primary Nurse Ja Kan, RN and moni webb rn, RN performed a dual skin assessment on this patient. normal...

## 2019-06-13 NOTE — ROUTINE PROCESS
Pt received lying in bed arrived to unit approximately 1440. A/O x4, pleasant. Breathing even and unlabored. Lung sounds clear on room air. Heart sounds within normal limits. No edema noted. Bowel sounds present in all quads. Surgical wound noted to right knee, skin otherwise intact, warm and dry, turgor within normal limits. Pt reports good appetite. Pt states she is in pain 3/10 but it is at tolerable level and declines intervention at this time. Pt is able to ambulate with walker with PWB to right leg. Requires 1 person assist with ADL care. Pt oriented to room with call bell and phone within reach.

## 2019-06-13 NOTE — PROGRESS NOTES
1945 Bedside shift change report given to janice rn (oncoming nurse) by Gennaro Khan rn (offgoing nurse). Report included the following information SBAR, Kardex and Recent Results. Patient assisted to [address] minimal asist, daughter and grandson at bedside. Knee immbolizer  2000 assessment completed. Oral care  Per self and danika care after using BSC provided. 2200 Dr Yamilet Payne notified  1900 h/h 7.0/22.3, am h/h 7.6/23.6, no knee orders . 0000 dr garcia verified okay to given lovenox with plt count 158. Reassessment completed, no changes noted. Oral and perucare completed. 0200 resting quietly , resp unlabored. 0400 reassessment completed. Oral and danika care after using bedpan. 0730 Bedside shift change report given to fredy mcgregor (oncoming nurse) by janice rn (offgoing nurse). Report included the following information SBAR and Kardex. Dual left leg assessment completed. Side rails up x 3, call light within reach. Hob elevated 30 degrees.

## 2019-06-13 NOTE — PROGRESS NOTES
Pt may go to Rehab. Ms. Vane Bertin is aware and notified of no HH in the am and that [facility] is low. As per CARRILLO Garcia states that Dr. Kendal Leal is aware and that at the Rehab, will do repeat [facility].

## 2019-06-13 NOTE — PROGRESS NOTES
Internal Medicine Progress Note    Patient's Name: Francoise Has  Admit Date: 6/10/2019  Length of Stay: 3      Assessment/Plan     Active Hospital Problems    Diagnosis Date Noted    Acute blood loss as cause of postoperative anemia 06/13/2019    Osteoarthritis of knee 06/10/2019    Right knee DJD 06/10/2019    Obesity, morbid ([facility] [address] 11/14/2018    Essential hypertension 12/31/2016    Type 2 diabetes mellitus without complication ([facility] 16/19/4829    Obstructive sleep apnea 01/05/2016     Overview:   1/20/2016 AHI/RDI: 6                   REM AHI: 27               SUPINE AHI: 10 SpO2 eli: 81% PLMI: 4      PAP titration Date: 3/15/2016  CPAP11 cm H2O (AHI: 0; SpO2 eli: 93%)  CPAP: 11 cm H2O PLMI: 3    CPAP 11  DME: [facility]       - Diet and mobilization per primary team  - Pain control PRN  - PT/OT  - BP in approp range  - Cont CPAP qhs  - Hgb down to 7.0  - No signs of active bleeding  - Should respond from bone marrow standpoint  - Transfuse if < 7  - OK for d/c from medicine standpoint w/ repeat CBC in AM  - SSI and accuchecks  - Cont acceptable home medications for chronic conditions   - DVT protocol    I have personally reviewed all pertinent labs and films that have officially resulted over the last 24 hours. I have personally checked for all pending labs that are awaiting final results.     Subjective     Pt s/e @ bedside  No major events overnight  Doing well  Pain worst after PT, but tolerable  No other complaints  Denies CP or SOB    Objective     Visit Vitals  /62 (BP 1 Location: Right arm, BP Patient Position: At rest)   Pulse 73   Temp 98.7 °F (37.1 °C)   Resp 18   Ht 4' 9\" (1.448 m)   Wt 88.6 kg (195 lb 7 oz)   SpO2 100%   BMI 42.29 kg/m²       Physical Exam:  General Appearance: NAD, conversant  Lungs: CTA with normal respiratory effort  CV: RRR, no m/r/g  Abdomen: soft, non-tender, normal bowel sounds  Extremities: no cyanosis, no peripheral edema, RLE dressing in place  Neuro: No focal deficits, motor/sensory intact    Lab/Data Reviewed:  BMP:   No results found for: NA, K, CL, CO2, AGAP, GLU, BUN, CREA, GFRAA, GFRNA  CBC:   Lab Results   Component Value Date/Time    WBC 13.9 (H) 06/12/2019 07:00 PM    HGB 7.0 (L) 06/12/2019 07:00 PM    HCT 22.3 (L) 06/12/2019 07:00 PM     06/12/2019 07:00 PM       Imaging Reviewed:  No results found.     Medications Reviewed:  Current Facility-Administered Medications   Medication Dose Route Frequency    calcium-vitamin D 600 mg(1,500mg) -200 unit per tablet 1 Tab  1 Tab Oral DAILY    cholecalciferol (VITAMIN D3) tablet 1,000 Units  1,000 Units Oral DAILY    latanoprost (XALATAN) 0.005 % ophthalmic solution 1 Drop  1 Drop Right Eye QHS    losartan (COZAAR) tablet 50 mg  50 mg Oral DAILY    hydroCHLOROthiazide (HYDRODIURIL) tablet 12.5 mg  12.5 mg Oral DAILY    sodium chloride (NS) flush 5-40 mL  5-40 mL IntraVENous Q8H    sodium chloride (NS) flush 5-40 mL  5-40 mL IntraVENous PRN    oxyCODONE-acetaminophen (PERCOCET) 5-325 mg per tablet 2 Tab  2 Tab Oral Q4H PRN    HYDROcodone-acetaminophen (NORCO)  mg tablet 2 Tab  2 Tab Oral Q4H PRN    morphine injection 2 mg  2 mg IntraVENous Q4H PRN    naloxone (NARCAN) injection 0.4 mg  0.4 mg IntraVENous PRN    ondansetron (ZOFRAN) injection 4 mg  4 mg IntraVENous Q4H PRN    bisacodyl (DULCOLAX) suppository 10 mg  10 mg Rectal DAILY PRN    enoxaparin (LOVENOX) injection 30 mg  30 mg SubCUTAneous Q12H           Marie Huerta DO  Internal Medicine, Hospitalist  Pager: [phone]  [address] [place] [address]

## 2019-06-13 NOTE — PROGRESS NOTES
Problem: Discharge Planning  Goal: *Discharge to safe environment  Outcome: resolved/met   snf    Pt dc'd to [facility] for snf    [facility] (SNF) Provider list has been given to the patient and/or patient representative. Patient and/or patient representative has signed the [place] of Choice selecting ___________[facility]______________ as their preference facility and a copy given. Both SNF Provider list and Freedom of Choice have been placed on the chart. Care Management Interventions  PCP Verified by CM: Yes  Palliative Care Criteria Met (RRAT>21 & CHF Dx)?: No  Mode of Transport at Discharge:  Other (see comment)  Transition of Care Consult (CM Consult): Discharge Planning, [address]: No  Reason Outside [place]: Physician referred to specific agency  Discharge Durable Medical Equipment: No  Physical Therapy Consult: Yes  Occupational Therapy Consult: Yes  Speech Therapy Consult: No  Current Support Network: Relative's Home  Confirm Follow Up Transport: Other (see comment)  Plan discussed with Pt/Family/Caregiver: Yes  Freedom of Choice Offered: Yes  Discharge Location  Discharge Placement: Skilled nursing facility

## 2019-06-13 NOTE — DISCHARGE SUMMARY
Discharge Summary    Admit Date: 6/10/2019  Discharge Date:  2019     Patient ID:   Name:  Ruben Holley      Age:  68 y.o.    :  1945    Admitting Diagnosis: Right knee DJD [M17.11]     Post Operative Day: 3    Operative Procedures:  TOTAL KNEE ARTHROPLASTY [95119] (KNEE ARTHROPLASTY TOTAL)      Isolation Precautions:   Not required. Patient is not currently contagious. Physical Exam on Discharge:  Visit Vitals  /74   Pulse 73   Temp 98.2 °F (36.8 °C)   Resp 18   Ht 4' 9\" (1.448 m)   Wt 88.6 kg (195 lb 7 oz)   SpO2 99%   BMI 42.29 kg/m²         General: in no apparent distress   Wound: clean, dry   Extremities:  Neurovascular intact    Dressing:  Dry   DVT Exam:   No evidence of DVT seen on physical exam;  compartments soft and NT. Relevant labs within last 72 hours:    CBC w/Diff    Lab Results   Component Value Date/Time    WBC 13.9 (H) 2019 07:00 PM    RBC 2.64 (L) 2019 07:00 PM    HCT 22.3 (L) 2019 07:00 PM    MCV 84.5 2019 07:00 PM    MCH 26.5 2019 07:00 PM    MCHC 31.4 2019 07:00 PM    RDW 15.3 (H) 2019 07:00 PM    Lab Results   Component Value Date/Time    MONOS 14 (H) 2019 07:00 PM    EOS 1 2019 07:00 PM    BASOS 0 2019 07:00 PM    RDW 15.3 (H) 2019 07:00 PM          BMP   Lab Results   Component Value Date     2019    CO2 31 2019    BUN 27 (H) 2019          Coagulation   Lab Results   Component Value Date    INR 1.1 2019    APTT 33.9 2019              Condition at discharge: Afebrile  Ambulating  Eating, Drinking, Voiding  Stable    Current Discharge Medication List      START taking these medications    Details   enoxaparin (LOVENOX) 30 mg/0.3 mL injection 0.3 mL by SubCUTAneous route every twelve (12) hours every twelve (12) hours.   Qty: 14 Syringe, Refills: 0      HYDROcodone-acetaminophen (NORCO)  mg tablet Take 1-2 Tabs by mouth every four (4) hours as needed for Pain for up to 7 days. Max Daily Amount: 12 Tabs. Qty: 40 Tab, Refills: 0    Associated Diagnoses: Primary osteoarthritis of right knee      naloxone (NARCAN) 4 mg/actuation nasal spray Use 1 spray intranasally into 1 nostril for sedation  Qty: 1 Each, Refills: 0         CONTINUE these medications which have NOT CHANGED    Details   losartan-hydroCHLOROthiazide (HYZAAR) 50-12.5 mg per tablet Take 1 Tab by mouth daily. latanoprost (XALATAN) 0.005 % ophthalmic solution Administer 1 Drop to right eye nightly. cholecalciferol (VITAMIN D3) 1,000 unit tablet 1,000 Units. calcium-cholecalciferol, d3, (CALCIUM 600 + D) 600-125 mg-unit tab Take  by mouth daily. diclofenac (VOLTAREN) 1 % gel Apply 2 g to affected area as needed. Indications: OSTEOARTHRITIS         STOP taking these medications       acetaminophen (TYLENOL) 325 mg tablet Comments:   Reason for Stopping:                 PCP:  Aparna Lenz PA        Disposition:  Clear for discharge to rehab, if clear by medicine service. Follow-up in the office in 14 with Dr. Adia Ortega; call [phone] to schedule appointment. Wound Care: prn dressing changes    DVT prophylaxis:  Lovenox per ortho for 14 days.     Weightbearing Status: Mervat Monte PA-C  6/13/2019  Office [phone]  Cell [phone]

## 2019-06-14 LAB
ANION GAP SERPL CALC-SCNC: 4 MMOL/L (ref 3–18)
BASOPHILS # BLD: 0 K/UL (ref 0–0.1)
BASOPHILS NFR BLD: 0 % (ref 0–2)
BUN SERPL-MCNC: 26 MG/DL (ref 7–18)
BUN/CREAT SERPL: 26 (ref 12–20)
CALCIUM SERPL-MCNC: 8.2 MG/DL (ref 8.5–10.1)
CHLORIDE SERPL-SCNC: 105 MMOL/L (ref 100–108)
CO2 SERPL-SCNC: 31 MMOL/L (ref 21–32)
CREAT SERPL-MCNC: 0.99 MG/DL (ref 0.6–1.3)
DIFFERENTIAL METHOD BLD: ABNORMAL
EOSINOPHIL # BLD: 0.3 K/UL (ref 0–0.4)
EOSINOPHIL NFR BLD: 2 % (ref 0–5)
ERYTHROCYTE [DISTWIDTH] IN BLOOD BY AUTOMATED COUNT: 15.5 % (ref 11.6–14.5)
GLUCOSE SERPL-MCNC: 93 MG/DL (ref 74–99)
HCT VFR BLD AUTO: 19.9 % (ref 35–45)
HGB BLD-MCNC: 6.3 G/DL (ref 12–16)
LYMPHOCYTES # BLD: 4.4 K/UL (ref 0.9–3.6)
LYMPHOCYTES NFR BLD: 35 % (ref 21–52)
MCH RBC QN AUTO: 27 PG (ref 24–34)
MCHC RBC AUTO-ENTMCNC: 31.7 G/DL (ref 31–37)
MCV RBC AUTO: 85.4 FL (ref 74–97)
MONOCYTES # BLD: 1.3 K/UL (ref 0.05–1.2)
MONOCYTES NFR BLD: 10 % (ref 3–10)
NEUTS SEG # BLD: 6.5 K/UL (ref 1.8–8)
NEUTS SEG NFR BLD: 53 % (ref 40–73)
PLATELET # BLD AUTO: 171 K/UL (ref 135–420)
PMV BLD AUTO: 11 FL (ref 9.2–11.8)
POTASSIUM SERPL-SCNC: 4.7 MMOL/L (ref 3.5–5.5)
RBC # BLD AUTO: 2.33 M/UL (ref 4.2–5.3)
SODIUM SERPL-SCNC: 140 MMOL/L (ref 136–145)
WBC # BLD AUTO: 12.5 K/UL (ref 4.6–13.2)

## 2019-06-14 PROCEDURE — 80048 BASIC METABOLIC PNL TOTAL CA: CPT

## 2019-06-14 PROCEDURE — 36415 COLL VENOUS BLD VENIPUNCTURE: CPT

## 2019-06-14 PROCEDURE — 74011250636 HC RX REV CODE- 250/636: Performed by: INTERNAL MEDICINE

## 2019-06-14 PROCEDURE — 85025 COMPLETE CBC W/AUTO DIFF WBC: CPT

## 2019-06-14 PROCEDURE — 74011250637 HC RX REV CODE- 250/637: Performed by: INTERNAL MEDICINE

## 2019-06-14 RX ORDER — ADHESIVE BANDAGE
30 BANDAGE TOPICAL DAILY PRN
Status: DISCONTINUED | OUTPATIENT
Start: 2019-06-14 | End: 2019-06-29 | Stop reason: HOSPADM

## 2019-06-14 RX ORDER — POLYETHYLENE GLYCOL 3350 17 G/17G
17 POWDER, FOR SOLUTION ORAL DAILY
Status: DISCONTINUED | OUTPATIENT
Start: 2019-06-15 | End: 2019-06-29 | Stop reason: HOSPADM

## 2019-06-14 RX ORDER — LATANOPROST 50 UG/ML
1 SOLUTION/ DROPS OPHTHALMIC EVERY EVENING
Status: DISCONTINUED | OUTPATIENT
Start: 2019-06-14 | End: 2019-06-29 | Stop reason: HOSPADM

## 2019-06-14 RX ORDER — DOCUSATE SODIUM 100 MG/1
100 CAPSULE, LIQUID FILLED ORAL DAILY
Status: DISCONTINUED | OUTPATIENT
Start: 2019-06-15 | End: 2019-06-29 | Stop reason: HOSPADM

## 2019-06-14 RX ORDER — FACIAL-BODY WIPES
10 EACH TOPICAL DAILY PRN
Status: DISCONTINUED | OUTPATIENT
Start: 2019-06-14 | End: 2019-06-29 | Stop reason: HOSPADM

## 2019-06-14 RX ADMIN — ENOXAPARIN SODIUM 30 MG: 30 INJECTION, SOLUTION INTRAVENOUS; SUBCUTANEOUS at 22:10

## 2019-06-14 RX ADMIN — MAGNESIUM HYDROXIDE 30 ML: 400 SUSPENSION ORAL at 06:35

## 2019-06-14 RX ADMIN — LATANOPROST 1 DROP: 50 SOLUTION/ DROPS OPHTHALMIC at 22:12

## 2019-06-14 RX ADMIN — HYDROCODONE BITARTRATE AND ACETAMINOPHEN 2 TABLET: 10; 325 TABLET ORAL at 14:07

## 2019-06-14 RX ADMIN — HYDROCODONE BITARTRATE AND ACETAMINOPHEN 2 TABLET: 10; 325 TABLET ORAL at 06:35

## 2019-06-14 RX ADMIN — VITAMIN D, TAB 1000IU (100/BT) 1000 UNITS: 25 TAB at 09:02

## 2019-06-14 RX ADMIN — Medication 1 TABLET: at 09:02

## 2019-06-14 RX ADMIN — ENOXAPARIN SODIUM 30 MG: 30 INJECTION, SOLUTION INTRAVENOUS; SUBCUTANEOUS at 11:00

## 2019-06-14 RX ADMIN — HYDROCHLOROTHIAZIDE 12.5 MG: 25 TABLET ORAL at 09:02

## 2019-06-14 RX ADMIN — LOSARTAN POTASSIUM 50 MG: 50 TABLET, FILM COATED ORAL at 09:02

## 2019-06-14 RX ADMIN — HYDROCODONE BITARTRATE AND ACETAMINOPHEN 2 TABLET: 10; 325 TABLET ORAL at 22:09

## 2019-06-14 NOTE — PROGRESS NOTES
[facility]    Daily progress Note    Patient: Mari Soria MRN: [id number]  CSN: [id number]    YOB: 1945  Age: 68 y.o. Sex: female    DOA: 6/13/2019 LOS:  LOS: 1 day                    Subjective:     CC: follow up  Right knee surgery    Ms. Kourtney Maciel is a 68 yr female, who was recently hospitalized from 6/10-6/13. Patient with hx of severe right knee DJD, failed conservative management with ongoing pain meds. Patient with hx of intermitted falls. She was see by Ortho and she is now s/p right total knee arthroplasty and tolerated. Patient was sent to [facility] on 6/13 for rehab. On examination she is alert and verbal. She report pain med is effective. Patient c/o of constipation no bm in 3 days. VSS check stable, noted hemoglobin is 7 on admission. No active bleeding noted. PAST MEDICAL hx: DM type 2, OA, Obesity, HTN,  Tricompartment osteoarthritis of knees, bilateral, FABIO - CPAP machine    PAST SURGICAL hx: Cataract removal, colonoscopy, hysterectomy, nephrectomy, rotator cuff repair right     SOCIAL hx : never smoked, not a drinker    FAMILY: DM     ALLERGIES: NKDA  Objective:      Visit Vitals  /67   Pulse 82   Temp 100.2 °F (37.9 °C)   Resp 18   Wt 94.3 kg (208 lb)   SpO2 100%   BMI 45.01 kg/m²       Physical Exam:  General appearance: alert, cooperative, no distress, appears stated age  [de-identified]: negative  Neck: supple, symmetrical, trachea midline, no adenopathy, thyroid: not enlarged, symmetric, no tenderness/mass/nodules, no carotid bruit and no JVD  Lungs: clear to auscultation bilaterally  Heart: regular rate and rhythm, S1, S2 normal, no murmur, click, rub or gallop  Abdomen: soft, non-tender. Bowel sounds normal. No masses,  no organomegaly  Pulses: 2+ and symmetric  Skin: Skin color, texture, turgor normal. No rashes or lesions   right knee - dressing in place stable    Intake and Output:  Current Shift:  No intake/output data recorded.   Last three shifts:  No intake/output data recorded. Recent Results (from the past 24 hour(s))   CBC WITH AUTOMATED DIFF    Collection Time: 06/14/19  6:06 AM   Result Value Ref Range    WBC 12.5 4.6 - 13.2 K/uL    RBC 2.33 (L) 4.20 - 5.30 M/uL    HGB 6.3 (L) 12.0 - 16.0 g/dL    HCT 19.9 (L) 35.0 - 45.0 %    MCV 85.4 74.0 - 97.0 FL    MCH 27.0 24.0 - 34.0 PG    MCHC 31.7 31.0 - 37.0 g/dL    RDW 15.5 (H) 11.6 - 14.5 %    PLATELET 085 373 - 824 K/uL    MPV 11.0 9.2 - 11.8 FL    NEUTROPHILS 53 40 - 73 %    LYMPHOCYTES 35 21 - 52 %    MONOCYTES 10 3 - 10 %    EOSINOPHILS 2 0 - 5 %    BASOPHILS 0 0 - 2 %    ABS. NEUTROPHILS 6.5 1.8 - 8.0 K/UL    ABS. LYMPHOCYTES 4.4 (H) 0.9 - 3.6 K/UL    ABS. MONOCYTES 1.3 (H) 0.05 - 1.2 K/UL    ABS. EOSINOPHILS 0.3 0.0 - 0.4 K/UL    ABS.  BASOPHILS 0.0 0.0 - 0.1 K/UL    DF AUTOMATED     METABOLIC PANEL, BASIC    Collection Time: 06/14/19  6:06 AM   Result Value Ref Range    Sodium 140 136 - 145 mmol/L    Potassium 4.7 3.5 - 5.5 mmol/L    Chloride 105 100 - 108 mmol/L    CO2 31 21 - 32 mmol/L    Anion gap 4 3.0 - 18 mmol/L    Glucose 93 74 - 99 mg/dL    BUN 26 (H) 7.0 - 18 MG/DL    Creatinine 0.99 0.6 - 1.3 MG/DL    BUN/Creatinine ratio 26 (H) 12 - 20      GFR est AA >60 >60 ml/min/1.73m2    GFR est non-AA 55 (L) >60 ml/min/1.73m2    Calcium 8.2 (L) 8.5 - 10.1 MG/DL         Current Facility-Administered Medications:     latanoprost (XALATAN) 0.005 % ophthalmic solution 1 Drop (Patient Supplied), 1 Drop, Right Eye, QPM, Guy Love MD    bisacodyl (DULCOLAX) suppository 10 mg, 10 mg, Rectal, DAILY PRN, Dion uFentes MD    magnesium hydroxide (MILK OF MAGNESIA) 400 mg/5 mL oral suspension 30 mL, 30 mL, Oral, DAILY PRN, Guy Love MD, 30 mL at 06/14/19 0635    [START ON 6/15/2019] docusate sodium (COLACE) capsule 100 mg, 100 mg, Oral, DAILY, Tayler Ritter NP    [START ON 6/15/2019] polyethylene glycol (MIRALAX) packet 17 g, 17 g, Oral, DAILY, Tayler Moralez NP    enoxaparin (LOVENOX) injection 30 mg, 30 mg, SubCUTAneous, Q12H, Guy Love MD, 30 mg at 06/13/19 2246    HYDROcodone-acetaminophen (NORCO)  mg tablet 1 Tab, 1 Tab, Oral, Q4H PRN, Malvin Murray MD    HYDROcodone-acetaminophen (NORCO)  mg tablet 2 Tab, 2 Tab, Oral, Q4H PRN, Malvin Murray MD, 2 Tab at 06/14/19 0635    naloxone (NARCAN) injection 0.4 mg, 0.4 mg, IntraVENous, PRN, Malvin Murray MD    cholecalciferol (VITAMIN D3) tablet 1,000 Units, 1,000 Units, Oral, DAILY, Guy Love MD, 1,000 Units at 06/14/19 0902    diclofenac (VOLTAREN) 1 % topical gel 2 g, 2 g, Topical, QID PRN, Malvin Murray MD    losartan (COZAAR) tablet 50 mg, 50 mg, Oral, DAILY, Guy Love MD, 50 mg at 06/14/19 0902    hydroCHLOROthiazide (HYDRODIURIL) tablet 12.5 mg, 12.5 mg, Oral, DAILY, Guy Love MD, 12.5 mg at 06/14/19 0902    calcium-vitamin D 600 mg(1,500mg) -200 unit per tablet 1 Tab, 1 Tab, Oral, DAILY, Malvin Murray MD, 1 Tab at 06/14/19 0902    Lab Results   Component Value Date/Time    Glucose 93 06/14/2019 06:06 AM    Glucose 116 (H) 06/12/2019 05:15 AM    Glucose 93 05/29/2019 08:56 AM    Glucose 91 03/22/2019 09:41 AM    Glucose 85 02/25/2019 08:13 AM        Assessment/Plan   severe right knee DJD,: she is s/p TOTAL KNEE ARTHROPLASTY [68598] (KNEE ARTHROPLASTY TOTAL on 6/11. Continue Lovenox  Times 14 days, pain  Mgt    Anemia probable from surgery: hemoglobin is 7, will check in AM and monitor    Constipation : will start colace and Miralax and monitor     DM type 2 : hgab1c is 5.7, diet control, no meds.      Full code- confirm  Denies being depressed    Decon: PT/OT    Elzie Starling, NP  6/14/2019, 11:58 AM

## 2019-06-14 NOTE — PROGRESS NOTES
Nutrition initial assessment/Plan of care [facility]      RECOMMENDATIONS:   1. Cardiac Diet  2. Monitor labs, weight and PO intake  3. RD to follow     GOALS:   1. PO intake meets >75% of protein/calorie needs by 6/21  2. Weight Maintenance/gradual loss (1-2 lb/week) by 6/21      ASSESSMENT:   Wt status is classified as obese per Body mass index is 45.01 kg/m². Adequate PO Intake. Labs noted. Pt w/ H/H (6.3/19.9) and elevated BUN. Nutrition recommendations listed. RD to follow. Nutrition Diagnoses:   Obesity related to excessive energy intake PTA as evidenced by a BMI of 45 and 245% IBW    Nutrition Risk:  [] High  [] Moderate [x]  Low    SUBJECTIVE/OBJECTIVE:    Pt transferred from [facility] to [facility] after being admitted for right total knee replacement. Pt is s/p right total KNEE ARTHROPLASTY with prp with femoral on (6/10). Last BM on (6/10); MOM given today. Pt seen in room later in the day relaxing. Denies having any food allergies or problems chewing/swallowing. Reports having a good appetite and consuming 3 meals per day at home. Stated she was seeing an outpatient RD prior to this. Explained that she was ordered for a cardiac diet and provided a menu to implement preferences. Discussed adequate fluid and fiber intake in diet to assist with constipation. Will continue to monitor.      Information Obtained from:    [x] Chart Review   [x] Patient   [] Family/Caregiver   [] Nurse/Physician   [] Interdisciplinary Meeting/Rounds      Diet: Cardiac Diet  Medications: [x] Reviewed    Allergies: [x] Reviewed   Patient Active Problem List   Diagnosis Code    Obesity, morbid ([address] E66.01    Absent kidney, acquired Z90.5    Essential hypertension I10    Obstructive sleep apnea G47.33    Tricompartment osteoarthritis of knees, bilateral M17.0    Type 2 diabetes mellitus without complication (HCC) J91.9    Morbid obesity ([address] E66.01    Osteoarthritis of knee M17.10    Right knee DJD M17.11    Acute blood loss as cause of postoperative anemia D62       Past Medical History:   Diagnosis Date    Arthritis     Diabetes ([address] 08/2016    no meds    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     Hypertension     Morbid obesity ([address]     Sleep apnea     CPAP machine    Use of cane as ambulatory aid       Labs:    Lab Results   Component Value Date/Time    Sodium 140 06/14/2019 06:06 AM    Potassium 4.7 06/14/2019 06:06 AM    Chloride 105 06/14/2019 06:06 AM    CO2 31 06/14/2019 06:06 AM    Anion gap 4 06/14/2019 06:06 AM    Glucose 93 06/14/2019 06:06 AM    BUN 26 (H) 06/14/2019 06:06 AM    Creatinine 0.99 06/14/2019 06:06 AM    Calcium 8.2 (L) 06/14/2019 06:06 AM    Magnesium 2.3 03/22/2019 09:41 AM    Albumin 3.5 05/29/2019 08:56 AM     Anthropometrics: BMI (calculated): 45  Last 3 Recorded Weights in this Encounter    06/13/19 1420   Weight: 94.3 kg (208 lb)      Ht Readings from Last 1 Encounters:   05/29/19 4' 9\" (1.448 m)     Weight Metrics 6/13/2019 6/10/2019 5/22/2019 5/22/2019 4/26/2019 4/26/2019 3/26/2019   Weight 208 lb 195 lb 7 oz - 196 lb - 197 lb -   Waist Measure Inches - - 42 - 40 - 44.5   BMI 45.01 kg/m2 42.29 kg/m2 - 42.41 kg/m2 - 42.63 kg/m2 -       No data found. IBW: 85 lb %IBW: 245% UBW: 195-200 lb  [] Weight Loss [x] Weight Gain?? [] Weight Stable    Estimated Nutrition Needs: [x] MSJ  [] Other:  Calories: 1004-5493 kcal Based on:   [x] Actual BW    Protein:   76-95 g Based on:   [x] Actual BW    Fluid:       1772-5939 ml Based on:   [x] Actual BW      [x] No Cultural, Protestant or ethnic dietary need identified.     [] Cultural, Protestant and ethnic food preferences identified and addressed     Wt Status:  [] Normal (18.6 - 24.9) [] Underweight (<18.5) [] Overweight (25 - 29.9) [] Mild Obesity (30 - 34.9)  [] Moderate Obesity (35 - 39.9) [x] Morbid Obesity (40+)       Nutrition Problems Identified:   [] Suboptimal PO intake   [] Food Allergies  [] Difficulty chewing/swallowing/poor dentition  [x] Constipation/Diarrhea (Last BM On 6/10; bowel regimen in place)   [] Nausea/Vomiting   [] None  [] Other:     Plan:   [x] Therapeutic Diet  [x]  Obtained/adjusted food preferences/tolerances and/or snacks options   []  Supplements added   [] Occupational therapy following for feeding techniques  []  HS snack added   []  Modify diet texture   []  Modify diet for food allergies   []  Educate patient   []  Assist with menu selection   [x]  Monitor PO intake on meal rounds   [x]  Continue inpatient monitoring and intervention   [x]  Participated in discharge planning/Interdisciplinary rounds/Team meetings   []  Other:     Education Needs:   [] Not appropriate for teaching at this time due to:   [x] Identified and addressed    Nutrition Monitoring and Evaluation:  [x] Continue ongoing monitoring and intervention  [] Other    Vinny Yanez

## 2019-06-14 NOTE — ROUTINE PROCESS
Right knee wound open to air, Imobilizer  off while in bed. Bilateral thigh hi donna's on bilateral  legs. Edema noted to bilateral lower legs > on right leg.  @ 1407 Norco  mg 2 tabs po for right knee pain 9/10

## 2019-06-15 PROCEDURE — 74011250636 HC RX REV CODE- 250/636: Performed by: INTERNAL MEDICINE

## 2019-06-15 PROCEDURE — 74011250637 HC RX REV CODE- 250/637: Performed by: INTERNAL MEDICINE

## 2019-06-15 PROCEDURE — 74011250637 HC RX REV CODE- 250/637: Performed by: NURSE PRACTITIONER

## 2019-06-15 RX ADMIN — HYDROCHLOROTHIAZIDE 12.5 MG: 25 TABLET ORAL at 09:32

## 2019-06-15 RX ADMIN — ENOXAPARIN SODIUM 30 MG: 30 INJECTION, SOLUTION INTRAVENOUS; SUBCUTANEOUS at 12:54

## 2019-06-15 RX ADMIN — LOSARTAN POTASSIUM 50 MG: 50 TABLET, FILM COATED ORAL at 09:32

## 2019-06-15 RX ADMIN — LATANOPROST 1 DROP: 50 SOLUTION/ DROPS OPHTHALMIC at 18:00

## 2019-06-15 RX ADMIN — POLYETHYLENE GLYCOL 3350 17 G: 17 POWDER, FOR SOLUTION ORAL at 09:34

## 2019-06-15 RX ADMIN — Medication 1 TABLET: at 09:32

## 2019-06-15 RX ADMIN — ENOXAPARIN SODIUM 30 MG: 30 INJECTION, SOLUTION INTRAVENOUS; SUBCUTANEOUS at 22:40

## 2019-06-15 RX ADMIN — DOCUSATE SODIUM 100 MG: 100 CAPSULE, LIQUID FILLED ORAL at 09:34

## 2019-06-15 RX ADMIN — HYDROCODONE BITARTRATE AND ACETAMINOPHEN 2 TABLET: 10; 325 TABLET ORAL at 20:30

## 2019-06-15 RX ADMIN — VITAMIN D, TAB 1000IU (100/BT) 1000 UNITS: 25 TAB at 09:32

## 2019-06-15 RX ADMIN — HYDROCODONE BITARTRATE AND ACETAMINOPHEN 1 TABLET: 10; 325 TABLET ORAL at 09:33

## 2019-06-15 NOTE — ROUTINE PROCESS
Bedside and Verbal shift change report given to ADILENE Schreiber RN (oncoming nurse) by ADILENE De La Garza RN (offgoing nurse). Report included the following information SBAR, Kardex and MAR.

## 2019-06-15 NOTE — ROUTINE PROCESS
Bedside and Verbal shift change report given to Marleni HERNANDEZ (oncoming nurse) by Brook Pina LPN (offgoing nurse). Report included the following information SBAR, Kardex and MAR.

## 2019-06-16 PROCEDURE — 77030018846 HC SOL IRR STRL H20 ICUM -A

## 2019-06-16 PROCEDURE — 74011250637 HC RX REV CODE- 250/637: Performed by: INTERNAL MEDICINE

## 2019-06-16 PROCEDURE — 74011250637 HC RX REV CODE- 250/637: Performed by: NURSE PRACTITIONER

## 2019-06-16 PROCEDURE — 74011250636 HC RX REV CODE- 250/636: Performed by: INTERNAL MEDICINE

## 2019-06-16 RX ADMIN — HYDROCODONE BITARTRATE AND ACETAMINOPHEN 2 TABLET: 10; 325 TABLET ORAL at 23:17

## 2019-06-16 RX ADMIN — LATANOPROST 1 DROP: 50 SOLUTION/ DROPS OPHTHALMIC at 17:07

## 2019-06-16 RX ADMIN — DOCUSATE SODIUM 100 MG: 100 CAPSULE, LIQUID FILLED ORAL at 09:26

## 2019-06-16 RX ADMIN — VITAMIN D, TAB 1000IU (100/BT) 1000 UNITS: 25 TAB at 09:27

## 2019-06-16 RX ADMIN — HYDROCHLOROTHIAZIDE 12.5 MG: 25 TABLET ORAL at 09:27

## 2019-06-16 RX ADMIN — HYDROCODONE BITARTRATE AND ACETAMINOPHEN 2 TABLET: 10; 325 TABLET ORAL at 06:11

## 2019-06-16 RX ADMIN — HYDROCODONE BITARTRATE AND ACETAMINOPHEN 1 TABLET: 10; 325 TABLET ORAL at 13:55

## 2019-06-16 RX ADMIN — ENOXAPARIN SODIUM 30 MG: 30 INJECTION, SOLUTION INTRAVENOUS; SUBCUTANEOUS at 22:06

## 2019-06-16 RX ADMIN — Medication 1 TABLET: at 09:26

## 2019-06-16 RX ADMIN — POLYETHYLENE GLYCOL 3350 17 G: 17 POWDER, FOR SOLUTION ORAL at 09:26

## 2019-06-16 RX ADMIN — ENOXAPARIN SODIUM 30 MG: 30 INJECTION, SOLUTION INTRAVENOUS; SUBCUTANEOUS at 11:00

## 2019-06-16 RX ADMIN — HYDROCODONE BITARTRATE AND ACETAMINOPHEN 2 TABLET: 10; 325 TABLET ORAL at 09:27

## 2019-06-16 RX ADMIN — LOSARTAN POTASSIUM 50 MG: 50 TABLET, FILM COATED ORAL at 09:26

## 2019-06-16 NOTE — ROUTINE PROCESS
Sat up in chair 50% shift with right leg elevated. Ice pack to right knee per patient request for comfort. Percocet   mg  2 po at 0927 and also 1 tab po at 1335. Patient reported pain medicine effective.

## 2019-06-16 NOTE — ROUTINE PROCESS
Bedside and Verbal shift change report given to Karthik Rivera RN (oncoming nurse) by Sven Chowdhury RN Report included the following information SBAR, Kardex and Med Rec Status. Qh visual checks and 24h chart checks done.

## 2019-06-17 LAB
ANION GAP SERPL CALC-SCNC: 5 MMOL/L (ref 3–18)
BASOPHILS # BLD: 0 K/UL (ref 0–0.1)
BASOPHILS NFR BLD: 0 % (ref 0–2)
BUN SERPL-MCNC: 23 MG/DL (ref 7–18)
BUN/CREAT SERPL: 23 (ref 12–20)
CALCIUM SERPL-MCNC: 8.4 MG/DL (ref 8.5–10.1)
CHLORIDE SERPL-SCNC: 103 MMOL/L (ref 100–108)
CO2 SERPL-SCNC: 31 MMOL/L (ref 21–32)
CREAT SERPL-MCNC: 1 MG/DL (ref 0.6–1.3)
DIFFERENTIAL METHOD BLD: ABNORMAL
EOSINOPHIL # BLD: 0.3 K/UL (ref 0–0.4)
EOSINOPHIL NFR BLD: 2 % (ref 0–5)
ERYTHROCYTE [DISTWIDTH] IN BLOOD BY AUTOMATED COUNT: 15.2 % (ref 11.6–14.5)
ERYTHROCYTE [DISTWIDTH] IN BLOOD BY AUTOMATED COUNT: 15.2 % (ref 11.6–14.5)
GLUCOSE SERPL-MCNC: 94 MG/DL (ref 74–99)
HCT VFR BLD AUTO: 21.2 % (ref 35–45)
HCT VFR BLD AUTO: 23.6 % (ref 35–45)
HEMOCCULT STL QL: NEGATIVE
HGB BLD-MCNC: 6.6 G/DL (ref 12–16)
HGB BLD-MCNC: 7.4 G/DL (ref 12–16)
LYMPHOCYTES # BLD: 3.3 K/UL (ref 0.9–3.6)
LYMPHOCYTES NFR BLD: 29 % (ref 21–52)
MCH RBC QN AUTO: 26.1 PG (ref 24–34)
MCH RBC QN AUTO: 26.3 PG (ref 24–34)
MCHC RBC AUTO-ENTMCNC: 31.1 G/DL (ref 31–37)
MCHC RBC AUTO-ENTMCNC: 31.4 G/DL (ref 31–37)
MCV RBC AUTO: 83.1 FL (ref 74–97)
MCV RBC AUTO: 84.5 FL (ref 74–97)
MONOCYTES # BLD: 1.3 K/UL (ref 0.05–1.2)
MONOCYTES NFR BLD: 11 % (ref 3–10)
NEUTS SEG # BLD: 6.5 K/UL (ref 1.8–8)
NEUTS SEG NFR BLD: 58 % (ref 40–73)
PLATELET # BLD AUTO: 325 K/UL (ref 135–420)
PLATELET # BLD AUTO: 381 K/UL (ref 135–420)
PMV BLD AUTO: 10.1 FL (ref 9.2–11.8)
PMV BLD AUTO: 10.4 FL (ref 9.2–11.8)
POTASSIUM SERPL-SCNC: 4.5 MMOL/L (ref 3.5–5.5)
RBC # BLD AUTO: 2.51 M/UL (ref 4.2–5.3)
RBC # BLD AUTO: 2.84 M/UL (ref 4.2–5.3)
SODIUM SERPL-SCNC: 139 MMOL/L (ref 136–145)
WBC # BLD AUTO: 11.4 K/UL (ref 4.6–13.2)
WBC # BLD AUTO: 13 K/UL (ref 4.6–13.2)

## 2019-06-17 PROCEDURE — 36415 COLL VENOUS BLD VENIPUNCTURE: CPT

## 2019-06-17 PROCEDURE — 80048 BASIC METABOLIC PNL TOTAL CA: CPT

## 2019-06-17 PROCEDURE — 74011250637 HC RX REV CODE- 250/637: Performed by: INTERNAL MEDICINE

## 2019-06-17 PROCEDURE — 82272 OCCULT BLD FECES 1-3 TESTS: CPT

## 2019-06-17 PROCEDURE — 85025 COMPLETE CBC W/AUTO DIFF WBC: CPT

## 2019-06-17 PROCEDURE — 85027 COMPLETE CBC AUTOMATED: CPT

## 2019-06-17 PROCEDURE — 74011250637 HC RX REV CODE- 250/637: Performed by: NURSE PRACTITIONER

## 2019-06-17 PROCEDURE — 74011250636 HC RX REV CODE- 250/636: Performed by: INTERNAL MEDICINE

## 2019-06-17 RX ORDER — LANOLIN ALCOHOL/MO/W.PET/CERES
1 CREAM (GRAM) TOPICAL 2 TIMES DAILY WITH MEALS
Status: DISCONTINUED | OUTPATIENT
Start: 2019-06-17 | End: 2019-06-29 | Stop reason: HOSPADM

## 2019-06-17 RX ORDER — ASCORBIC ACID 250 MG
500 TABLET ORAL DAILY
Status: DISCONTINUED | OUTPATIENT
Start: 2019-06-17 | End: 2019-06-29 | Stop reason: HOSPADM

## 2019-06-17 RX ADMIN — LOSARTAN POTASSIUM 50 MG: 50 TABLET, FILM COATED ORAL at 08:54

## 2019-06-17 RX ADMIN — Medication 500 MG: at 12:28

## 2019-06-17 RX ADMIN — VITAMIN D, TAB 1000IU (100/BT) 1000 UNITS: 25 TAB at 08:53

## 2019-06-17 RX ADMIN — ENOXAPARIN SODIUM 30 MG: 30 INJECTION, SOLUTION INTRAVENOUS; SUBCUTANEOUS at 12:28

## 2019-06-17 RX ADMIN — POLYETHYLENE GLYCOL 3350 17 G: 17 POWDER, FOR SOLUTION ORAL at 09:00

## 2019-06-17 RX ADMIN — HYDROCODONE BITARTRATE AND ACETAMINOPHEN 2 TABLET: 10; 325 TABLET ORAL at 23:28

## 2019-06-17 RX ADMIN — HYDROCODONE BITARTRATE AND ACETAMINOPHEN 1 TABLET: 10; 325 TABLET ORAL at 08:55

## 2019-06-17 RX ADMIN — FERROUS SULFATE TAB 325 MG (65 MG ELEMENTAL FE) 325 MG: 325 (65 FE) TAB at 17:43

## 2019-06-17 RX ADMIN — ENOXAPARIN SODIUM 30 MG: 30 INJECTION, SOLUTION INTRAVENOUS; SUBCUTANEOUS at 23:28

## 2019-06-17 RX ADMIN — LATANOPROST 1 DROP: 50 SOLUTION/ DROPS OPHTHALMIC at 19:33

## 2019-06-17 RX ADMIN — Medication 1 TABLET: at 08:53

## 2019-06-17 RX ADMIN — HYDROCHLOROTHIAZIDE 12.5 MG: 25 TABLET ORAL at 08:53

## 2019-06-17 RX ADMIN — DOCUSATE SODIUM 100 MG: 100 CAPSULE, LIQUID FILLED ORAL at 08:54

## 2019-06-17 NOTE — PROGRESS NOTES
conducted an initial consultation and Spiritual Assessment for Silverio Mixon, who is a 68 y.o.,female. Patients Primary Language is: [place]. According to the patients EMR Restorationist Affiliation is: [place]. The reason the Patient came to the hospital is:   Patient Active Problem List    Diagnosis Date Noted    Acute blood loss as cause of postoperative anemia 06/13/2019    Osteoarthritis of knee 06/10/2019    Right knee DJD 06/10/2019    Absent kidney, acquired 03/28/2019    Obesity, morbid ([address] 11/14/2018    Essential hypertension 12/31/2016    Type 2 diabetes mellitus without complication ([facility] [address] 71/47/5545    Obstructive sleep apnea 01/05/2016    Morbid obesity ([facility] [address] 01/05/2016    Tricompartment osteoarthritis of knees, bilateral 01/01/2016        The  provided the following Interventions:  Initiated a relationship of care and support with patient in room 3203. Listened empathically as patient talked about her condition and how she feels that she needs to rehab to get her feet back under her and some more strength. Provided information about Spiritual Care Services. Offered prayer and assurance of continued prayers on patients behalf. The following outcomes were achieved:  Patient shared limited information about her medical narrative and spiritual journey/beliefs. Patient processed feeling about current hospitalization. Patient expressed gratitude for pastoral care visit. Assessment:  Patient does not have any Jainism/cultural needs that will affect patients preferences in health care. There are no further spiritual or Jainism issues which require Spiritual Care Services interventions at this time. Plan:  Chaplains will continue to follow and will provide pastoral care on an as needed/requested basis    . Astrid Lazaro   Spiritual Care   [phone]

## 2019-06-17 NOTE — H&P
[facility]  HISTORY AND PHYSICAL    Name:  Kailash Hess  MR#:   [id number]  :  1945  ACCOUNT #:  [de-identified]  ADMIT DATE:  2019    REASON FOR ADMISSION:  Total knee arthroplasty. HISTORY OF PRESENT ILLNESS:  The patient is a pleasant 64-year-old female, past medical history significant for hypertension, diabetes, who was admitted to the hospital secondary to osteoarthritis of the knee. The patient underwent total knee replacement without any complication. The patient was seen in consultation by physical therapy/occupational therapy and skilled nursing facility was recommended. The patient was started on Lovenox for DVT prophylaxis and Norco for pain management. At the time of my evaluation, the patient is alert, awake, oriented to place, person, and year. The patient denies any chest pain, shortness of breath, or palpitation; however, she does report occasional knee pain, 4/10, dull in nature without radiation. PAST MEDICAL HISTORY:  Hypertension, osteoarthritis, diabetes, obstructive sleep apnea, obesity. PAST SURGICAL HISTORY:  Cataract surgery, , hysterectomy, nephrectomy, rotator cuff surgery. ALLERGIES:  NO KNOWN DRUG ALLERGIES. CURRENT MEDICATIONS:  See attached list.    SOCIAL HISTORY:  She is a nonsmoker, nondrinker. FAMILY HISTORY:  Positive for diabetes. REVIEW OF SYSTEMS:  No fever or chills. No weight loss or headache. No neck pain or sore throat. No chest pain or palpitation. No shortness of breath or cough. No nausea, vomiting, diarrhea. No dysuria or polyuria. No back pain. Occasional knee pain. No heat or cold intolerance. No anxiety or depression. PHYSICAL EXAMINATION:  GENERAL:  This is a well-nourished, well-developed female, in no apparent distress. VITAL SIGNS:  Temperature is 99, blood pressure is 93/60, heart rate is 76, respiratory rate is 18, satting 93%. HEENT:  Normocephalic, atraumatic. Sclerae anicteric. Oropharynx clear. Mucus membranes moist.  NECK:  No JVD or thyromegaly. LUNGS:  Clear to auscultation bilaterally. No wheezing. HEART:  S1 and S2, regular rate and rhythm. ABDOMEN:  Nontender, nondistended, normoactive bowel sounds. EXTREMITIES:  No edema or clubbing. Motor strength is 5/5 in all 4 extremities. Incision to the right knee is clean, dry, and intact and decreased range of motion on the right knee. ASSESSMENT AND PLAN:  1. Osteoarthritis, status post right total knee arthroplasty. Continue with Lovenox for deep venous thrombosis prophylaxis and pain management with Norco.  2.  Hypertension, on HCTZ and Cozaar. 3.  Deconditioning. Continue physical therapy/occupational therapy. 4.  Diabetes. The patient is diet managed. 5.  Advanced directive completed. 6.  Depression screening negative.         Sina Alan MD      [id number]  D:  06/16/2019 23:30  T:  06/17/2019 1:27  JOB #:  [id number]

## 2019-06-17 NOTE — PROGRESS NOTES
I have reviewed this patient's current medication list and recent laboratory results. H/H is low but rising. Please monitor closely. Thank you,  Gisella HEAD  Ph. M. S.  6/17/2019

## 2019-06-17 NOTE — ROUTINE PROCESS
Bedside and Verbal shift change report given to Itzel (oncoming nurse) by Barrett Winslow RN (offgoing nurse). Report included the following information SBAR, Kardex and MAR.

## 2019-06-18 LAB
ERYTHROCYTE [DISTWIDTH] IN BLOOD BY AUTOMATED COUNT: 15.3 % (ref 11.6–14.5)
ERYTHROCYTE [DISTWIDTH] IN BLOOD BY AUTOMATED COUNT: 15.8 % (ref 11.6–14.5)
HCT VFR BLD AUTO: 22.3 % (ref 35–45)
HCT VFR BLD AUTO: 22.5 % (ref 35–45)
HGB BLD-MCNC: 6.9 G/DL (ref 12–16)
HGB BLD-MCNC: 6.9 G/DL (ref 12–16)
IRON SATN MFR SERPL: 25 %
IRON SERPL-MCNC: 63 UG/DL (ref 50–175)
MCH RBC QN AUTO: 26.3 PG (ref 24–34)
MCH RBC QN AUTO: 26.6 PG (ref 24–34)
MCHC RBC AUTO-ENTMCNC: 30.7 G/DL (ref 31–37)
MCHC RBC AUTO-ENTMCNC: 30.9 G/DL (ref 31–37)
MCV RBC AUTO: 85.9 FL (ref 74–97)
MCV RBC AUTO: 86.1 FL (ref 74–97)
PLATELET # BLD AUTO: 353 K/UL (ref 135–420)
PLATELET # BLD AUTO: 368 K/UL (ref 135–420)
PMV BLD AUTO: 9.6 FL (ref 9.2–11.8)
PMV BLD AUTO: 9.6 FL (ref 9.2–11.8)
RBC # BLD AUTO: 2.59 M/UL (ref 4.2–5.3)
RBC # BLD AUTO: 2.62 M/UL (ref 4.2–5.3)
TIBC SERPL-MCNC: 251 UG/DL (ref 250–450)
WBC # BLD AUTO: 11.7 K/UL (ref 4.6–13.2)
WBC # BLD AUTO: 12.3 K/UL (ref 4.6–13.2)

## 2019-06-18 PROCEDURE — 83540 ASSAY OF IRON: CPT

## 2019-06-18 PROCEDURE — 74011250637 HC RX REV CODE- 250/637: Performed by: NURSE PRACTITIONER

## 2019-06-18 PROCEDURE — 74011250636 HC RX REV CODE- 250/636: Performed by: INTERNAL MEDICINE

## 2019-06-18 PROCEDURE — 85027 COMPLETE CBC AUTOMATED: CPT

## 2019-06-18 PROCEDURE — 74011250637 HC RX REV CODE- 250/637: Performed by: INTERNAL MEDICINE

## 2019-06-18 PROCEDURE — 36415 COLL VENOUS BLD VENIPUNCTURE: CPT

## 2019-06-18 RX ADMIN — LATANOPROST 1 DROP: 50 SOLUTION/ DROPS OPHTHALMIC at 18:00

## 2019-06-18 RX ADMIN — DOCUSATE SODIUM 100 MG: 100 CAPSULE, LIQUID FILLED ORAL at 09:09

## 2019-06-18 RX ADMIN — VITAMIN D, TAB 1000IU (100/BT) 1000 UNITS: 25 TAB at 09:08

## 2019-06-18 RX ADMIN — Medication 500 MG: at 09:08

## 2019-06-18 RX ADMIN — POLYETHYLENE GLYCOL 3350 17 G: 17 POWDER, FOR SOLUTION ORAL at 09:07

## 2019-06-18 RX ADMIN — LOSARTAN POTASSIUM 50 MG: 50 TABLET, FILM COATED ORAL at 09:08

## 2019-06-18 RX ADMIN — Medication 1 TABLET: at 09:09

## 2019-06-18 RX ADMIN — ENOXAPARIN SODIUM 30 MG: 30 INJECTION, SOLUTION INTRAVENOUS; SUBCUTANEOUS at 11:40

## 2019-06-18 RX ADMIN — FERROUS SULFATE TAB 325 MG (65 MG ELEMENTAL FE) 325 MG: 325 (65 FE) TAB at 09:10

## 2019-06-18 RX ADMIN — HYDROCHLOROTHIAZIDE 12.5 MG: 25 TABLET ORAL at 09:09

## 2019-06-18 RX ADMIN — ENOXAPARIN SODIUM 30 MG: 30 INJECTION, SOLUTION INTRAVENOUS; SUBCUTANEOUS at 22:44

## 2019-06-18 RX ADMIN — HYDROCODONE BITARTRATE AND ACETAMINOPHEN 1 TABLET: 10; 325 TABLET ORAL at 11:24

## 2019-06-18 RX ADMIN — FERROUS SULFATE TAB 325 MG (65 MG ELEMENTAL FE) 325 MG: 325 (65 FE) TAB at 18:01

## 2019-06-18 RX ADMIN — HYDROCODONE BITARTRATE AND ACETAMINOPHEN 1 TABLET: 10; 325 TABLET ORAL at 18:01

## 2019-06-18 NOTE — PROGRESS NOTES
Care Plan Meeting held with the following attendees: Activities/,Rehab,DON, Ms. Chance Nicole and Ms. Rafaela Galindo. Initial care plan reviewed along with ,therapy goals and frequency of service delivery,pt ability as of this date,activities offered and patient participation in activities,discharge plans, and specific questions regarding medications and other areas of concern. Patient agreed to Sunday Therapy but not Saturday due to [place], Eddi Person, [address], answered questions regarding therapy. And suggested pain medication prior to therapy so pain is not a barrier. She has her home CPap machine at bedside. Lois Bautista, addressed questions regarding H&H and possible transfusion. Arabella Sons also confirmed requesting the pain medications to assist with tolerating therapy better. For Pierre Rowe they would [facility] to [facility] as Ortho office has made referral, verified with Дмитрий Cormier liaison. HME at home is a walker and shower bench. She does have 5 steps to enter home and resides in a two story home with her daughter. She was encouraged to participate in activities. At this time the only request was a radio which was provided and she was assisted with a channel to listen to.

## 2019-06-18 NOTE — PROGRESS NOTES
[facility]    Daily progress Note    Patient: Ruben Holley MRN: [id number]  CSN: [id number]    YOB: 1945  Age: 68 y.o. Sex: female    DOA: 6/13/2019 LOS:  LOS: 5 days                    Subjective:     CC:  Ms. Amira Dent is a 68 yr female, who was recently hospitalized from 6/10-6/13. Patient with hx of severe right knee DJD, failed conservative management with ongoing pain meds. Patient with hx of intermitted falls. She was see by Ortho and she is now s/p right total knee arthroplasty and tolerated. Patient was sent to [facility] on 6/13 for rehab. On examination she is alert and verbal. She report pain med is effective. Patient c/o of constipation no bm in 3 days. VSS check stable, noted hemoglobin is 7 on admission.  No active bleeding noted.         PAST MEDICAL hx: DM type 2, OA, Obesity, HTN,  Tricompartment osteoarthritis of knees, bilateral, FABIO - CPAP machine     PAST SURGICAL hx: Cataract removal, colonoscopy, hysterectomy, nephrectomy, rotator cuff repair right      SOCIAL hx : never smoked, not a drinker     FAMILY: DM      ALLERGIES: NKDA      ROS  CONST: Fever, weight loss, fatigue or chills  HEENT: Recent changes in vision, vertigo  CV: Chest pain, palpitations, edema and varicosities  RESP: Cough, shortness of breath, wheezing  GI: Nausea, vomiting, abdominal pain, change in bowel habits,  : Hematuria, dysuria, frequency  MS: Weakness, joint pain and arthritis  LYMPH/HEME: Anemia, bruising and history of blood transfusions  INTEG: Dermatitis, abnormal moles  NEURO: Dizziness, headache, fainting, seizures and stroke  PSYCH: Anxiety and depression    meds reviewed    Objective:      Visit Vitals  /76   Pulse 78   Temp 96.9 °F (36.1 °C)   Resp 20   Wt 95.3 kg (210 lb 3.2 oz)   SpO2 100%   BMI 45.49 kg/m²       Physical Exam:  General appearance: alert, cooperative, no distress, appears stated age  [de-identified]: negative  Neck: supple, symmetrical, trachea midline, no adenopathy, thyroid: not enlarged, symmetric, no tenderness/mass/nodules, no carotid bruit and no JVD  Lungs: clear to auscultation bilaterally  Heart: regular rate and rhythm, S1, S2 normal, no murmur, click, rub or gallop  Abdomen: soft, non-tender. Bowel sounds normal. No masses,  no organomegaly  Pulses: 2+ and symmetric  Skin: left knee surgical site stable      Intake and Output:  Current Shift:  No intake/output data recorded. Last three shifts:  No intake/output data recorded.     Recent Results (from the past 24 hour(s))   CBC W/O DIFF    Collection Time: 06/17/19  4:45 PM   Result Value Ref Range    WBC 13.0 4.6 - 13.2 K/uL    RBC 2.84 (L) 4.20 - 5.30 M/uL    HGB 7.4 (L) 12.0 - 16.0 g/dL    HCT 23.6 (L) 35.0 - 45.0 %    MCV 83.1 74.0 - 97.0 FL    MCH 26.1 24.0 - 34.0 PG    MCHC 31.4 31.0 - 37.0 g/dL    RDW 15.2 (H) 11.6 - 14.5 %    PLATELET 449 366 - 669 K/uL    MPV 10.1 9.2 - 11.8 FL   OCCULT BLOOD, STOOL    Collection Time: 06/17/19  9:30 PM   Result Value Ref Range    Occult blood, stool NEGATIVE  NEG     IRON PROFILE    Collection Time: 06/18/19  6:00 AM   Result Value Ref Range    Iron 63 50 - 175 ug/dL    TIBC 251 250 - 450 ug/dL    Iron % saturation 25 %   CBC W/O DIFF    Collection Time: 06/18/19  6:00 AM   Result Value Ref Range    WBC 11.7 4.6 - 13.2 K/uL    RBC 2.59 (L) 4.20 - 5.30 M/uL    HGB 6.9 (L) 12.0 - 16.0 g/dL    HCT 22.3 (L) 35.0 - 45.0 %    MCV 86.1 74.0 - 97.0 FL    MCH 26.6 24.0 - 34.0 PG    MCHC 30.9 (L) 31.0 - 37.0 g/dL    RDW 15.3 (H) 11.6 - 14.5 %    PLATELET 384 459 - 354 K/uL    MPV 9.6 9.2 - 11.8 FL         Current Facility-Administered Medications:     ferrous sulfate tablet 325 mg, 1 Tab, Oral, BID WITH MEALS, Tayler Ritter NP, 325 mg at 06/18/19 0910    ascorbic acid (vitamin C) (VITAMIN C) tablet 500 mg, 500 mg, Oral, DAILY, Tayler Ritter NP, 500 mg at 06/18/19 0908    latanoprost (XALATAN) 0.005 % ophthalmic solution 1 Drop  (Patient Supplied), 1 Drop, Right Eye, QPM, Kailey Woodward MD, 1 Drop at 06/17/19 1933    bisacodyl (DULCOLAX) suppository 10 mg, 10 mg, Rectal, DAILY PRN, Kailey Woodward MD    magnesium hydroxide (MILK OF MAGNESIA) 400 mg/5 mL oral suspension 30 mL, 30 mL, Oral, DAILY PRN, Kailey Woodward MD, 30 mL at 06/14/19 0635    docusate sodium (COLACE) capsule 100 mg, 100 mg, Oral, DAILY, Tayler Ritter NP, 100 mg at 06/18/19 0909    polyethylene glycol (MIRALAX) packet 17 g, 17 g, Oral, DAILY, Tayler Ritter NP, 17 g at 06/18/19 0907    enoxaparin (LOVENOX) injection 30 mg, 30 mg, SubCUTAneous, Q12H, Kailey Woodward MD, 30 mg at 06/17/19 2328    HYDROcodone-acetaminophen (NORCO)  mg tablet 1 Tab, 1 Tab, Oral, Q4H PRN, Kailey Woodward MD, 1 Tab at 06/17/19 0855    HYDROcodone-acetaminophen (NORCO)  mg tablet 2 Tab, 2 Tab, Oral, Q4H PRN, Kailey Woodward MD, 2 Tab at 06/17/19 2328    naloxone (NARCAN) injection 0.4 mg, 0.4 mg, IntraVENous, PRN, Kailey Woodward MD    cholecalciferol (VITAMIN D3) tablet 1,000 Units, 1,000 Units, Oral, DAILY, Kailey Woodward MD, 1,000 Units at 06/18/19 0908    diclofenac (VOLTAREN) 1 % topical gel 2 g, 2 g, Topical, QID PRN, Kailey Woodward MD    losartan (COZAAR) tablet 50 mg, 50 mg, Oral, DAILY, Kailey Woodward MD, 50 mg at 06/18/19 0908    hydroCHLOROthiazide (HYDRODIURIL) tablet 12.5 mg, 12.5 mg, Oral, DAILY, Kailey Woodward MD, 12.5 mg at 06/18/19 0909    calcium-vitamin D 600 mg(1,500mg) -200 unit per tablet 1 Tab, 1 Tab, Oral, DAILY, Kailey Woodward MD, 1 Tab at 06/18/19 0909    Lab Results   Component Value Date/Time    Glucose 94 06/17/2019 04:55 AM    Glucose 93 06/14/2019 06:06 AM    Glucose 116 (H) 06/12/2019 05:15 AM    Glucose 93 05/29/2019 08:56 AM    Glucose 91 03/22/2019 09:41 AM        Assessment/Plan   severe right knee DJD,: she is s/p TOTAL KNEE ARTHROPLASTY [46271] (KNEE ARTHROPLASTY TOTAL on 6/11.  Continue Lovenox  Times 14 days, pain Mgt     Anemia probable from surgery: hemoglobin is 7 on admission, slight decreased to 6.6, repeat 6.9, will closely monitor. No active bleeding.        Constipation : will continue colace and Miralax and monitor      DM type 2 : hgab1c is 5.7, diet control, no meds.      Full code- confirm  Denies being depressed     Decon: PT/OT      Afsaneh Triplett NP  6/18/2019, 9:41 AM

## 2019-06-19 LAB
ERYTHROCYTE [DISTWIDTH] IN BLOOD BY AUTOMATED COUNT: 16.1 % (ref 11.6–14.5)
HCT VFR BLD AUTO: 22.2 % (ref 35–45)
HGB BLD-MCNC: 6.8 G/DL (ref 12–16)
MCH RBC QN AUTO: 26.5 PG (ref 24–34)
MCHC RBC AUTO-ENTMCNC: 30.6 G/DL (ref 31–37)
MCV RBC AUTO: 86.4 FL (ref 74–97)
PLATELET # BLD AUTO: 395 K/UL (ref 135–420)
PMV BLD AUTO: 9.6 FL (ref 9.2–11.8)
RBC # BLD AUTO: 2.57 M/UL (ref 4.2–5.3)
WBC # BLD AUTO: 12 K/UL (ref 4.6–13.2)

## 2019-06-19 PROCEDURE — 36415 COLL VENOUS BLD VENIPUNCTURE: CPT

## 2019-06-19 PROCEDURE — 74011250637 HC RX REV CODE- 250/637: Performed by: INTERNAL MEDICINE

## 2019-06-19 PROCEDURE — 74011250637 HC RX REV CODE- 250/637: Performed by: NURSE PRACTITIONER

## 2019-06-19 PROCEDURE — 85027 COMPLETE CBC AUTOMATED: CPT

## 2019-06-19 PROCEDURE — 74011250636 HC RX REV CODE- 250/636: Performed by: INTERNAL MEDICINE

## 2019-06-19 RX ORDER — MAG HYDROX/ALUMINUM HYD/SIMETH 200-200-20
30 SUSPENSION, ORAL (FINAL DOSE FORM) ORAL
Status: DISCONTINUED | OUTPATIENT
Start: 2019-06-19 | End: 2019-06-29 | Stop reason: HOSPADM

## 2019-06-19 RX ORDER — PANTOPRAZOLE SODIUM 20 MG/1
20 TABLET, DELAYED RELEASE ORAL
Status: DISCONTINUED | OUTPATIENT
Start: 2019-06-20 | End: 2019-06-19

## 2019-06-19 RX ORDER — ENOXAPARIN SODIUM 100 MG/ML
30 INJECTION SUBCUTANEOUS EVERY 12 HOURS
Status: DISCONTINUED | OUTPATIENT
Start: 2019-06-22 | End: 2019-06-29 | Stop reason: HOSPADM

## 2019-06-19 RX ORDER — PANTOPRAZOLE SODIUM 20 MG/1
20 TABLET, DELAYED RELEASE ORAL
Status: DISCONTINUED | OUTPATIENT
Start: 2019-06-20 | End: 2019-06-29 | Stop reason: HOSPADM

## 2019-06-19 RX ADMIN — ENOXAPARIN SODIUM 30 MG: 30 INJECTION, SOLUTION INTRAVENOUS; SUBCUTANEOUS at 12:25

## 2019-06-19 RX ADMIN — HYDROCODONE BITARTRATE AND ACETAMINOPHEN 1 TABLET: 10; 325 TABLET ORAL at 09:28

## 2019-06-19 RX ADMIN — LOSARTAN POTASSIUM 50 MG: 50 TABLET, FILM COATED ORAL at 09:28

## 2019-06-19 RX ADMIN — FERROUS SULFATE TAB 325 MG (65 MG ELEMENTAL FE) 325 MG: 325 (65 FE) TAB at 17:51

## 2019-06-19 RX ADMIN — Medication 500 MG: at 09:27

## 2019-06-19 RX ADMIN — LATANOPROST 1 DROP: 50 SOLUTION/ DROPS OPHTHALMIC at 18:00

## 2019-06-19 RX ADMIN — Medication 1 TABLET: at 09:27

## 2019-06-19 RX ADMIN — HYDROCHLOROTHIAZIDE 12.5 MG: 25 TABLET ORAL at 09:28

## 2019-06-19 RX ADMIN — HYDROCODONE BITARTRATE AND ACETAMINOPHEN 2 TABLET: 10; 325 TABLET ORAL at 20:29

## 2019-06-19 RX ADMIN — VITAMIN D, TAB 1000IU (100/BT) 1000 UNITS: 25 TAB at 09:27

## 2019-06-19 RX ADMIN — FERROUS SULFATE TAB 325 MG (65 MG ELEMENTAL FE) 325 MG: 325 (65 FE) TAB at 09:27

## 2019-06-19 RX ADMIN — DOCUSATE SODIUM 100 MG: 100 CAPSULE, LIQUID FILLED ORAL at 09:27

## 2019-06-19 RX ADMIN — ALUMINUM HYDROXIDE, MAGNESIUM HYDROXIDE, AND SIMETHICONE 30 ML: 200; 200; 20 SUSPENSION ORAL at 13:57

## 2019-06-19 RX ADMIN — HYDROCODONE BITARTRATE AND ACETAMINOPHEN 1 TABLET: 10; 325 TABLET ORAL at 14:04

## 2019-06-19 NOTE — PROGRESS NOTES
[facility]    Daily progress Note    Patient: Kaia Pimentel MRN: [id number]  CSN: [id number]    YOB: 1945  Age: 68 y.o. Sex: female    DOA: 6/13/2019 LOS:  LOS: 6 days                    Subjective:     CC: follow up hemoglobin     Patient hemoglobin is now 6.8, occult stool was negative. Patient report being tried at times. She denies any active bleeding now. Her VSS remain stable. Discussed with patient, will transfused 1 unit of PRBC, risk was discussed in detailed, and she agree with transfusion. One examination she is alert, sitting up in chair having lunch. NAD      PAST MEDICAL hx: DM type 2, OA, Obesity, HTN,  Tricompartment osteoarthritis of knees, bilateral, FABIO - CPAP machine     PAST SURGICAL hx: Cataract removal, colonoscopy, hysterectomy, nephrectomy, rotator cuff repair right      SOCIAL hx : never smoked, not a drinker     FAMILY: DM      ALLERGIES: NKDA        ROS  CONST: Fever, weight loss, fatigue or chills  HEENT: Recent changes in vision, vertigo  CV: Chest pain, palpitations, edema and varicosities  RESP: Cough, shortness of breath, wheezing  GI: Nausea, vomiting, abdominal pain, change in bowel habits,  : Hematuria, dysuria, frequency  MS: Weakness, joint pain and arthritis  LYMPH/HEME: Anemia, bruising and history of blood transfusions  INTEG: Dermatitis, abnormal moles  NEURO: Dizziness, headache, fainting, seizures and stroke  PSYCH: Anxiety and depression     meds reviewed        Objective:      Visit Vitals  /81   Pulse 82   Temp 99.1 °F (37.3 °C)   Resp 18   Wt 95.3 kg (210 lb 3.2 oz)   SpO2 94%   BMI 45.49 kg/m²       Physical Exam:  General appearance: alert, cooperative, no distress, appears stated age  HEENT: negative  Neck: supple, symmetrical, trachea midline, no adenopathy, thyroid: not enlarged, symmetric, no tenderness/mass/nodules, no carotid bruit and no JVD  Abdomen: soft, non-tender.  Bowel sounds normal. No masses,  no organomegaly  Pulses: 2+ and symmetric  Skin: Skin color, texture, turgor normal. No rashes or lesions  Right knee - surgical site - stable    Intake and Output:  Current Shift:  No intake/output data recorded. Last three shifts:  No intake/output data recorded.     Recent Results (from the past 24 hour(s))   CBC W/O DIFF    Collection Time: 06/18/19  7:00 PM   Result Value Ref Range    WBC 12.3 4.6 - 13.2 K/uL    RBC 2.62 (L) 4.20 - 5.30 M/uL    HGB 6.9 (L) 12.0 - 16.0 g/dL    HCT 22.5 (L) 35.0 - 45.0 %    MCV 85.9 74.0 - 97.0 FL    MCH 26.3 24.0 - 34.0 PG    MCHC 30.7 (L) 31.0 - 37.0 g/dL    RDW 15.8 (H) 11.6 - 14.5 %    PLATELET 557 915 - 150 K/uL    MPV 9.6 9.2 - 11.8 FL   CBC W/O DIFF    Collection Time: 06/19/19  4:10 AM   Result Value Ref Range    WBC 12.0 4.6 - 13.2 K/uL    RBC 2.57 (L) 4.20 - 5.30 M/uL    HGB 6.8 (L) 12.0 - 16.0 g/dL    HCT 22.2 (L) 35.0 - 45.0 %    MCV 86.4 74.0 - 97.0 FL    MCH 26.5 24.0 - 34.0 PG    MCHC 30.6 (L) 31.0 - 37.0 g/dL    RDW 16.1 (H) 11.6 - 14.5 %    PLATELET 263 176 - 715 K/uL    MPV 9.6 9.2 - 11.8 FL         Current Facility-Administered Medications:     [START ON 6/22/2019] enoxaparin (LOVENOX) injection 30 mg, 30 mg, SubCUTAneous, Q12H, Tayler Ritter NP    ferrous sulfate tablet 325 mg, 1 Tab, Oral, BID WITH MEALS, Tayler Ritter NP, 325 mg at 06/19/19 8604    ascorbic acid (vitamin C) (VITAMIN C) tablet 500 mg, 500 mg, Oral, DAILY, Tayler Ritter NP, 500 mg at 06/19/19 2525    latanoprost (XALATAN) 0.005 % ophthalmic solution 1 Drop  (Patient Supplied), 1 Drop, Right Eye, QPM, Iman Tidwell MD, 1 Drop at 06/18/19 1800    bisacodyl (DULCOLAX) suppository 10 mg, 10 mg, Rectal, DAILY PRN, Iman Tidwell MD    magnesium hydroxide (MILK OF MAGNESIA) 400 mg/5 mL oral suspension 30 mL, 30 mL, Oral, DAILY PRN, Guy Love MD, 30 mL at 06/14/19 0635    docusate sodium (COLACE) capsule 100 mg, 100 mg, Oral, DAILY, Tayler Ritter NP, 100 mg at 06/19/19 4842    polyethylene glycol (MIRALAX) packet 17 g, 17 g, Oral, DAILY, Tayler Ritter NP, 17 g at 06/18/19 0907    HYDROcodone-acetaminophen (NORCO)  mg tablet 1 Tab, 1 Tab, Oral, Q4H PRN, Malvin Murray MD, 1 Tab at 06/19/19 0928    HYDROcodone-acetaminophen (NORCO)  mg tablet 2 Tab, 2 Tab, Oral, Q4H PRN, Malvin Murray MD, 2 Tab at 06/17/19 2328    naloxone (NARCAN) injection 0.4 mg, 0.4 mg, IntraVENous, PRN, Malvin Murray MD    cholecalciferol (VITAMIN D3) tablet 1,000 Units, 1,000 Units, Oral, DAILY, Guy Love MD, 1,000 Units at 06/19/19 7522    diclofenac (VOLTAREN) 1 % topical gel 2 g, 2 g, Topical, QID PRN, Malvin Murray MD    losartan (COZAAR) tablet 50 mg, 50 mg, Oral, DAILY, Guy Love MD, 50 mg at 06/19/19 0928    hydroCHLOROthiazide (HYDRODIURIL) tablet 12.5 mg, 12.5 mg, Oral, DAILY, Guy Love MD, 12.5 mg at 06/19/19 0928    calcium-vitamin D 600 mg(1,500mg) -200 unit per tablet 1 Tab, 1 Tab, Oral, DAILY, Malvin Murray MD, 1 Tab at 06/19/19 0927    Lab Results   Component Value Date/Time    Glucose 94 06/17/2019 04:55 AM    Glucose 93 06/14/2019 06:06 AM    Glucose 116 (H) 06/12/2019 05:15 AM    Glucose 93 05/29/2019 08:56 AM    Glucose 91 03/22/2019 09:41 AM        Assessment/Plan   Anemia: will transfused 1 unit of prbc and continue monitor h/h    Patient to continue pt as tolerated. monitor VSs      severe right knee DJD,: she is s/p TOTAL KNEE ARTHROPLASTY [87620] (KNEE ARTHROPLASTY TOTAL on 6/11.  Continue Lovenox  Times 14 days, pain  Mgt      Chepe Chisholm NP  6/19/2019, 9:41 AM

## 2019-06-19 NOTE — ROUTINE PROCESS
Patient complaints of gas pains and has been passing flatus. PRN Mylanta at 1357. Norco 10- 325 mg 1 tab po at 6000 and 1404 for complaints of right knee pain 6/10. Thigh hi donna's to bilateral legs  Noted lower portion of incision with drop of blood on donna hose from scab that was knocked off while patient pulled up her donna's. Area cleaned and steri strip applied and covered with guaze dressing.

## 2019-06-19 NOTE — ROUTINE PROCESS
Called Infusion center to inquire if ewe can add patient on for tomorrow or Friday for transfusion of 1 unit PRBC. spoke with Matt Dutton and she stated they were unable to accommodate patient on Thursday or Friday. Lizett Mcneill NP made aware and she stated she would speak with   .

## 2019-06-19 NOTE — ROUTINE PROCESS
Bedside and Verbal shift change report given to [address] [place] [address] (oncoming nurse) by Brianna Calles RN (offgoing nurse). Report included the following information SBAR, Kardex and MAR.

## 2019-06-20 ENCOUNTER — HOSPITAL ENCOUNTER (OUTPATIENT)
Dept: INFUSION THERAPY | Age: 74
Discharge: HOME | End: 2019-06-20
Payer: COMMERCIAL

## 2019-06-20 ENCOUNTER — PATIENT OUTREACH (OUTPATIENT)
Dept: CASE MANAGEMENT | Age: 74
End: 2019-06-20

## 2019-06-20 VITALS
TEMPERATURE: 99 F | RESPIRATION RATE: 18 BRPM | HEART RATE: 83 BPM | OXYGEN SATURATION: 100 % | SYSTOLIC BLOOD PRESSURE: 107 MMHG | DIASTOLIC BLOOD PRESSURE: 68 MMHG

## 2019-06-20 PROCEDURE — 74011250637 HC RX REV CODE- 250/637: Performed by: NURSE PRACTITIONER

## 2019-06-20 PROCEDURE — 86923 COMPATIBILITY TEST ELECTRIC: CPT

## 2019-06-20 PROCEDURE — 86900 BLOOD TYPING SEROLOGIC ABO: CPT

## 2019-06-20 PROCEDURE — 74011250637 HC RX REV CODE- 250/637: Performed by: INTERNAL MEDICINE

## 2019-06-20 PROCEDURE — 36415 COLL VENOUS BLD VENIPUNCTURE: CPT

## 2019-06-20 RX ADMIN — HYDROCHLOROTHIAZIDE 12.5 MG: 25 TABLET ORAL at 08:52

## 2019-06-20 RX ADMIN — LATANOPROST 1 DROP: 50 SOLUTION/ DROPS OPHTHALMIC at 18:23

## 2019-06-20 RX ADMIN — LOSARTAN POTASSIUM 50 MG: 50 TABLET, FILM COATED ORAL at 08:52

## 2019-06-20 RX ADMIN — HYDROCODONE BITARTRATE AND ACETAMINOPHEN 1 TABLET: 10; 325 TABLET ORAL at 13:24

## 2019-06-20 RX ADMIN — VITAMIN D, TAB 1000IU (100/BT) 1000 UNITS: 25 TAB at 08:52

## 2019-06-20 RX ADMIN — FERROUS SULFATE TAB 325 MG (65 MG ELEMENTAL FE) 325 MG: 325 (65 FE) TAB at 18:23

## 2019-06-20 RX ADMIN — FERROUS SULFATE TAB 325 MG (65 MG ELEMENTAL FE) 325 MG: 325 (65 FE) TAB at 08:52

## 2019-06-20 RX ADMIN — PANTOPRAZOLE SODIUM 20 MG: 20 TABLET, DELAYED RELEASE ORAL at 08:52

## 2019-06-20 RX ADMIN — Medication 1 TABLET: at 08:52

## 2019-06-20 RX ADMIN — Medication 500 MG: at 08:52

## 2019-06-20 RX ADMIN — HYDROCODONE BITARTRATE AND ACETAMINOPHEN 1 TABLET: 10; 325 TABLET ORAL at 18:22

## 2019-06-20 RX ADMIN — DOCUSATE SODIUM 100 MG: 100 CAPSULE, LIQUID FILLED ORAL at 08:52

## 2019-06-20 RX ADMIN — HYDROCODONE BITARTRATE AND ACETAMINOPHEN 2 TABLET: 10; 325 TABLET ORAL at 05:58

## 2019-06-20 NOTE — ROUTINE PROCESS
Attempted to get patient another pair of thigh hi donna hose , purchasing only had X large which was too small for patient. No further evidence of bleeding from lower portion of right knee wound.

## 2019-06-20 NOTE — PROGRESS NOTES
completed follow up visit with patient in room [phone] and a Spiritual assessment of patient was done. Patient reports that she is tired but doing what she can to complete this program and azalia on with life.  Chaplains will continue to follow and will provide pastoral care on an as needed/requested basis    Chaplain Diego Adames   Board Certified [address] [place] [address]   [phone]

## 2019-06-20 NOTE — PROGRESS NOTES
Community Care Team Documentation for Patient in [facility]     Patient discharged from  [facility] to [facility], on 6/13/19. Hospital Discharge diagnosis:  Total Knee Right, Arthroplasty    SNF Attending Provider:  Dr. Radha Wallace    Anticipated discharge date from SNF:  TBD    PCP : CARRILLO Lara    Nurse Navigator:     [facility] [place] [place] rounds completed, updates provided by facility. RRAT:  Low Risk            7       Total Score        3 Has Seen PCP in Last 6 Months (Yes=3, No=0)    4 Charlson Comorbidity Score (Age + Comorbid Conditions)        Criteria that do not apply:    . Living with Significant Other. Assisted Living. LTAC. SNF. or   Rehab    Patient Length of Stay (>5 days = 3)    IP Visits Last 12 Months (1-3=4, 4=9, >4=11)    Pt.  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     Hypertension     Morbid obesity ([address]     Sleep apnea     CPAP machine    Use of cane as ambulatory aid          Active Ambulatory Problems     Diagnosis Date Noted    Obesity, morbid ([address] 11/14/2018    Absent kidney, acquired 03/28/2019    Essential hypertension 12/31/2016    Obstructive sleep apnea 01/05/2016    Tricompartment osteoarthritis of knees, bilateral 01/01/2016    Type 2 diabetes mellitus without complication ([address] 86/56/1857    Morbid obesity ([address] 01/05/2016    Osteoarthritis of knee 06/10/2019    Right knee DJD 06/10/2019    Acute blood loss as cause of postoperative anemia 06/13/2019     Resolved Ambulatory Problems     Diagnosis Date Noted    No Resolved Ambulatory Problems     Past Medical History:   Diagnosis Date    Arthritis     Diabetes ([address] 08/2016    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

## 2019-06-20 NOTE — PROGRESS NOTES
Nutrition follow-up/Plan of care [facility]      RECOMMENDATIONS:   1. Cardiac Diet  2. Monitor labs, weight and PO intake  3. RD to follow     GOALS:   1. Ongoing: PO intake meets >75% of protein/calorie needs by 6/27  2. Ongoing: Weight Maintenance/gradual loss (1-2 lb/week) by 6/27     ASSESSMENT:   Wt status is classified as obese per Body mass index is 45.49 kg/m². Adequate PO Intake. Labs noted. Pt w/ H/H (6.8/22. 2) and elevated BUN; GFR (55). Nutrition recommendations listed. RD to follow. Nutrition Risk:  [] High  [] Moderate [x]  Low    SUBJECTIVE/OBJECTIVE:   (6/20): Noted weight increase of 2 lb or 0.9% from admission weight; ?? fluid. Last BM on (6/19) per I/Os. Noted Pt still with low H/H so plan is to get PRBC transfusion. Pt seen in room OOB in chair after lunch; observed 90% of meal consumed. Reports she still does not have an appetite but is still eating her meals. Encouraged to consume what she can and will see how she feels after being transfused. Will continue to monitor. (6/14): Pt transferred from [facility] to [facility] after being admitted for right total knee replacement. Pt is s/p right total KNEE ARTHROPLASTY with prp with femoral on (6/10). Last BM on (6/10); MOM given today. Pt seen in room later in the day relaxing. Denies having any food allergies or problems chewing/swallowing. Reports having a good appetite and consuming 3 meals per day at home. Stated she was seeing an outpatient RD prior to this. Explained that she was ordered for a cardiac diet and provided a menu to implement preferences. Discussed adequate fluid and fiber intake in diet to assist with constipation. Will continue to monitor.      Information Obtained from:    [x] Chart Review   [x] Patient   [] Family/Caregiver   [] Nurse/Physician   [] Interdisciplinary Meeting/Rounds      Diet: Cardiac Diet  Medications: [x] Reviewed    Allergies: [x] Reviewed   Patient Active Problem List   Diagnosis Code    Obesity, morbid ([facility] E66.01    Absent kidney, acquired Z90.5    Essential hypertension I10    Obstructive sleep apnea G47.33    Tricompartment osteoarthritis of knees, bilateral M17.0    Type 2 diabetes mellitus without complication (HCC) P48.2    Morbid obesity ([facility]) E66.01    Osteoarthritis of knee M17.10    Right knee DJD M17.11    Acute blood loss as cause of postoperative anemia D62       Past Medical History:   Diagnosis Date    Arthritis     Diabetes ([facility] 08/2016    no meds    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     Hypertension     Morbid obesity ([facility]     Sleep apnea     CPAP machine    Use of cane as ambulatory aid       Labs:    Lab Results   Component Value Date/Time    Sodium 139 06/17/2019 04:55 AM    Potassium 4.5 06/17/2019 04:55 AM    Chloride 103 06/17/2019 04:55 AM    CO2 31 06/17/2019 04:55 AM    Anion gap 5 06/17/2019 04:55 AM    Glucose 94 06/17/2019 04:55 AM    BUN 23 (H) 06/17/2019 04:55 AM    Creatinine 1.00 06/17/2019 04:55 AM    Calcium 8.4 (L) 06/17/2019 04:55 AM    Magnesium 2.3 03/22/2019 09:41 AM    Albumin 3.5 05/29/2019 08:56 AM     Anthropometrics: BMI (calculated): 45.5  Last 3 Recorded Weights in this Encounter    06/13/19 1420 06/17/19 1033   Weight: 94.3 kg (208 lb) 95.3 kg (210 lb 3.2 oz)      Ht Readings from Last 1 Encounters:   05/29/19 4' 9\" (1.448 m)     Weight Metrics 6/17/2019 6/10/2019 5/22/2019 5/22/2019 4/26/2019 4/26/2019 3/26/2019   Weight 210 lb 3.2 oz 195 lb 7 oz - 196 lb - 197 lb -   Waist Measure Inches - - 42 - 40 - 44.5   BMI 45.49 kg/m2 42.29 kg/m2 - 42.41 kg/m2 - 42.63 kg/m2 -       No data found. UBW: 195-200 lb  [] Weight Loss  [x] Weight Gain (2 lb or <1% from admission weight; fluid ? ?)  [] Weight Stable    Estimated Nutrition Needs: [x] MSJ  [] Other:  Calories: 0098-2412 kcal Based on:   [x] Actual BW    Protein:   76-95 g Based on:   [x] Actual BW    Fluid:       0855-9846 ml Based on:   [x] Actual BW        Nutrition Problems Identified:   [] Suboptimal PO intake   [] Food Allergies  [] Difficulty chewing/swallowing/poor dentition  [x] Constipation/Diarrhea (Last BM on 6/19; bowel regimen in place)   [] Nausea/Vomiting   [] None  [] Other:     Plan:   [x] Therapeutic Diet  [x]  Obtained/adjusted food preferences/tolerances and/or snacks options   []  Supplements added   [] Occupational therapy following for feeding techniques  []  HS snack added   []  Modify diet texture   []  Modify diet for food allergies   []  Educate patient   []  Assist with menu selection   [x]  Monitor PO intake on meal rounds   [x]  Continue inpatient monitoring and intervention   [x]  Participated in discharge planning/Interdisciplinary rounds/Team meetings   []  Other:     Education Needs:   [] Not appropriate for teaching at this time due to:   [x] Identified and addressed    Nutrition Monitoring and Evaluation:  [x] Continue ongoing monitoring and intervention  [] Other    Kiersten Pallas

## 2019-06-20 NOTE — ROUTINE PROCESS
Bedside, Verbal and Written shift change report given to horace mcgregor (oncoming nurse) by Richar Garza (offgoing nurse). Report included the following information SBAR, Intake/Output and MAR.

## 2019-06-21 ENCOUNTER — HOSPITAL ENCOUNTER (OUTPATIENT)
Dept: INFUSION THERAPY | Age: 74
Discharge: HOME | End: 2019-06-21
Payer: COMMERCIAL

## 2019-06-21 VITALS
SYSTOLIC BLOOD PRESSURE: 115 MMHG | TEMPERATURE: 98.2 F | HEART RATE: 83 BPM | RESPIRATION RATE: 16 BRPM | DIASTOLIC BLOOD PRESSURE: 66 MMHG | OXYGEN SATURATION: 100 %

## 2019-06-21 PROCEDURE — 74011250637 HC RX REV CODE- 250/637: Performed by: INTERNAL MEDICINE

## 2019-06-21 PROCEDURE — 74011250637 HC RX REV CODE- 250/637: Performed by: NURSE PRACTITIONER

## 2019-06-21 PROCEDURE — P9016 RBC LEUKOCYTES REDUCED: HCPCS

## 2019-06-21 PROCEDURE — 74011250636 HC RX REV CODE- 250/636: Performed by: INTERNAL MEDICINE

## 2019-06-21 PROCEDURE — 77030012965 HC NDL HUBR BBMI -A

## 2019-06-21 PROCEDURE — 77030013169 SET IV BLD ICUM -A

## 2019-06-21 PROCEDURE — 36430 TRANSFUSION BLD/BLD COMPNT: CPT

## 2019-06-21 RX ORDER — SODIUM CHLORIDE 0.9 % (FLUSH) 0.9 %
10-40 SYRINGE (ML) INJECTION AS NEEDED
Status: DISCONTINUED | OUTPATIENT
Start: 2019-06-21 | End: 2019-06-25 | Stop reason: HOSPADM

## 2019-06-21 RX ORDER — SODIUM CHLORIDE 9 MG/ML
25 INJECTION, SOLUTION INTRAVENOUS CONTINUOUS
Status: DISCONTINUED | OUTPATIENT
Start: 2019-06-21 | End: 2019-06-22 | Stop reason: HOSPADM

## 2019-06-21 RX ORDER — ACETAMINOPHEN 325 MG/1
650 TABLET ORAL ONCE
Status: COMPLETED | OUTPATIENT
Start: 2019-06-21 | End: 2019-06-21

## 2019-06-21 RX ORDER — DIPHENHYDRAMINE HCL 25 MG
25 CAPSULE ORAL ONCE
Status: COMPLETED | OUTPATIENT
Start: 2019-06-21 | End: 2019-06-21

## 2019-06-21 RX ADMIN — POLYETHYLENE GLYCOL 3350 17 G: 17 POWDER, FOR SOLUTION ORAL at 08:55

## 2019-06-21 RX ADMIN — HYDROCODONE BITARTRATE AND ACETAMINOPHEN 1 TABLET: 10; 325 TABLET ORAL at 03:24

## 2019-06-21 RX ADMIN — Medication 10 ML: at 17:22

## 2019-06-21 RX ADMIN — DOCUSATE SODIUM 100 MG: 100 CAPSULE, LIQUID FILLED ORAL at 08:55

## 2019-06-21 RX ADMIN — Medication 1 TABLET: at 08:55

## 2019-06-21 RX ADMIN — HYDROCODONE BITARTRATE AND ACETAMINOPHEN 1 TABLET: 10; 325 TABLET ORAL at 22:16

## 2019-06-21 RX ADMIN — Medication 500 MG: at 08:55

## 2019-06-21 RX ADMIN — ACETAMINOPHEN 650 MG: 325 TABLET ORAL at 13:47

## 2019-06-21 RX ADMIN — FERROUS SULFATE TAB 325 MG (65 MG ELEMENTAL FE) 325 MG: 325 (65 FE) TAB at 18:46

## 2019-06-21 RX ADMIN — Medication 10 ML: at 13:35

## 2019-06-21 RX ADMIN — HYDROCODONE BITARTRATE AND ACETAMINOPHEN 1 TABLET: 10; 325 TABLET ORAL at 12:39

## 2019-06-21 RX ADMIN — PANTOPRAZOLE SODIUM 20 MG: 20 TABLET, DELAYED RELEASE ORAL at 08:55

## 2019-06-21 RX ADMIN — DIPHENHYDRAMINE HYDROCHLORIDE 25 MG: 25 CAPSULE ORAL at 13:47

## 2019-06-21 RX ADMIN — HYDROCHLOROTHIAZIDE 12.5 MG: 25 TABLET ORAL at 08:55

## 2019-06-21 RX ADMIN — FERROUS SULFATE TAB 325 MG (65 MG ELEMENTAL FE) 325 MG: 325 (65 FE) TAB at 08:55

## 2019-06-21 RX ADMIN — SODIUM CHLORIDE 25 ML/HR: 9 INJECTION, SOLUTION INTRAVENOUS at 14:55

## 2019-06-21 RX ADMIN — LOSARTAN POTASSIUM 50 MG: 50 TABLET, FILM COATED ORAL at 08:55

## 2019-06-21 RX ADMIN — VITAMIN D, TAB 1000IU (100/BT) 1000 UNITS: 25 TAB at 08:55

## 2019-06-21 RX ADMIN — LATANOPROST 1 DROP: 50 SOLUTION/ DROPS OPHTHALMIC at 18:46

## 2019-06-21 NOTE — ROUTINE PROCESS
Bedside, Verbal and Written shift change report given to horace mcgregor (oncoming nurse) by Butch Liu (offgoing nurse). Report included the following information SBAR, Intake/Output and MAR.

## 2019-06-21 NOTE — PROGRESS NOTES
[facility] Progress Note    Date: 2019    Name: Norberto Mckeon    MRN: [id number]         : 1945      Ms. Walls was assessed and education was provided. Ms. Jackson Webb vitals were reviewed and patient was observed for 5 minutes prior to treatment. Visit Vitals  /72 (BP 1 Location: Right arm, BP Patient Position: At rest;Sitting)   Pulse 79   Temp 98.6 °F (37 °C)   Resp 16   SpO2 100%   Breastfeeding? No     Pre meds tylenol 650 mg PO and benadryl 25 mg PO given before blood transfusion started. One unit PRBC's given IV via peripheral line after brisk blood return obtained. Transfusion started at 1500 and ended at [id number]. Ms. Sally Henson tolerated the infusion, and had no complaints. Patient armband removed and shredded. Ms Sally Henson did not stay for observation. She went back to [facility] with nursing assistant. Ms. Sally Henson was discharged from [place] [address] in stable condition at [phone]. She has no more [facility] appts.   Flora Brown RN  2019  5:10 PM

## 2019-06-21 NOTE — ROUTINE PROCESS
Transferred to infusion center vi wheelchair accompanied by nurse aide and patients daughter. Norco  mg 1 tab po at 1239 for complaints of right knee pain 5/10.

## 2019-06-21 NOTE — PROGRESS NOTES
Pt returned from infusion center accompanied by CNA, pt assisted to bed, VS WNL, call bell and personal items within reach

## 2019-06-22 LAB
ABO + RH BLD: NORMAL
BLD PROD TYP BPU: NORMAL
BLOOD GROUP ANTIBODIES SERPL: NORMAL
BPU ID: NORMAL
CROSSMATCH RESULT,%XM: NORMAL
ERYTHROCYTE [DISTWIDTH] IN BLOOD BY AUTOMATED COUNT: 17.7 % (ref 11.6–14.5)
HCT VFR BLD AUTO: 26.2 % (ref 35–45)
HGB BLD-MCNC: 8.1 G/DL (ref 12–16)
MCH RBC QN AUTO: 27.2 PG (ref 24–34)
MCHC RBC AUTO-ENTMCNC: 30.9 G/DL (ref 31–37)
MCV RBC AUTO: 87.9 FL (ref 74–97)
PLATELET # BLD AUTO: 415 K/UL (ref 135–420)
PMV BLD AUTO: 9.6 FL (ref 9.2–11.8)
RBC # BLD AUTO: 2.98 M/UL (ref 4.2–5.3)
SPECIMEN EXP DATE BLD: NORMAL
STATUS OF UNIT,%ST: NORMAL
UNIT DIVISION, %UDIV: 0
WBC # BLD AUTO: 14.3 K/UL (ref 4.6–13.2)

## 2019-06-22 PROCEDURE — 74011250637 HC RX REV CODE- 250/637: Performed by: NURSE PRACTITIONER

## 2019-06-22 PROCEDURE — 74011250636 HC RX REV CODE- 250/636: Performed by: NURSE PRACTITIONER

## 2019-06-22 PROCEDURE — 85027 COMPLETE CBC AUTOMATED: CPT

## 2019-06-22 PROCEDURE — 74011250637 HC RX REV CODE- 250/637: Performed by: INTERNAL MEDICINE

## 2019-06-22 PROCEDURE — 36415 COLL VENOUS BLD VENIPUNCTURE: CPT

## 2019-06-22 RX ADMIN — PANTOPRAZOLE SODIUM 20 MG: 20 TABLET, DELAYED RELEASE ORAL at 09:20

## 2019-06-22 RX ADMIN — POLYETHYLENE GLYCOL 3350 17 G: 17 POWDER, FOR SOLUTION ORAL at 09:20

## 2019-06-22 RX ADMIN — FERROUS SULFATE TAB 325 MG (65 MG ELEMENTAL FE) 325 MG: 325 (65 FE) TAB at 09:18

## 2019-06-22 RX ADMIN — ENOXAPARIN SODIUM 30 MG: 30 INJECTION, SOLUTION INTRAVENOUS; SUBCUTANEOUS at 00:14

## 2019-06-22 RX ADMIN — HYDROCODONE BITARTRATE AND ACETAMINOPHEN 1 TABLET: 10; 325 TABLET ORAL at 09:18

## 2019-06-22 RX ADMIN — VITAMIN D, TAB 1000IU (100/BT) 1000 UNITS: 25 TAB at 09:17

## 2019-06-22 RX ADMIN — Medication 500 MG: at 09:18

## 2019-06-22 RX ADMIN — Medication 1 TABLET: at 09:18

## 2019-06-22 RX ADMIN — LOSARTAN POTASSIUM 50 MG: 50 TABLET, FILM COATED ORAL at 09:18

## 2019-06-22 RX ADMIN — HYDROCHLOROTHIAZIDE 12.5 MG: 25 TABLET ORAL at 09:18

## 2019-06-22 RX ADMIN — FERROUS SULFATE TAB 325 MG (65 MG ELEMENTAL FE) 325 MG: 325 (65 FE) TAB at 17:00

## 2019-06-22 RX ADMIN — DOCUSATE SODIUM 100 MG: 100 CAPSULE, LIQUID FILLED ORAL at 09:17

## 2019-06-22 RX ADMIN — ENOXAPARIN SODIUM 30 MG: 30 INJECTION, SOLUTION INTRAVENOUS; SUBCUTANEOUS at 12:59

## 2019-06-22 RX ADMIN — LATANOPROST 1 DROP: 50 SOLUTION/ DROPS OPHTHALMIC at 18:06

## 2019-06-22 RX ADMIN — HYDROCODONE BITARTRATE AND ACETAMINOPHEN 1 TABLET: 10; 325 TABLET ORAL at 17:44

## 2019-06-22 NOTE — ROUTINE PROCESS
Bedside and Verbal shift change report given to Itzel (oncoming nurse) by Shirin Conti RN (offgoing nurse). Report included the following information SBAR, Kardex and MAR.

## 2019-06-22 NOTE — ROUTINE PROCESS
Up in wheelchair ambulates to bathroom with assistance of one, denies pain at present to right knee, ice on knee for comfort . Patient aware to remove ice pack after 10 min.

## 2019-06-22 NOTE — ROUTINE PROCESS
Bedside and Verbal shift change report given to [place] lpn (oncoming nurse) by mary joy rn (offgoing nurse). Report included the following information Kardex, MAR and Recent Results.

## 2019-06-23 PROCEDURE — 74011250637 HC RX REV CODE- 250/637: Performed by: NURSE PRACTITIONER

## 2019-06-23 PROCEDURE — 74011250636 HC RX REV CODE- 250/636: Performed by: NURSE PRACTITIONER

## 2019-06-23 PROCEDURE — 74011250637 HC RX REV CODE- 250/637: Performed by: INTERNAL MEDICINE

## 2019-06-23 RX ADMIN — LATANOPROST 1 DROP: 50 SOLUTION/ DROPS OPHTHALMIC at 18:18

## 2019-06-23 RX ADMIN — HYDROCODONE BITARTRATE AND ACETAMINOPHEN 2 TABLET: 10; 325 TABLET ORAL at 21:13

## 2019-06-23 RX ADMIN — FERROUS SULFATE TAB 325 MG (65 MG ELEMENTAL FE) 325 MG: 325 (65 FE) TAB at 10:13

## 2019-06-23 RX ADMIN — DOCUSATE SODIUM 100 MG: 100 CAPSULE, LIQUID FILLED ORAL at 10:13

## 2019-06-23 RX ADMIN — Medication 1 TABLET: at 10:13

## 2019-06-23 RX ADMIN — PANTOPRAZOLE SODIUM 20 MG: 20 TABLET, DELAYED RELEASE ORAL at 10:12

## 2019-06-23 RX ADMIN — FERROUS SULFATE TAB 325 MG (65 MG ELEMENTAL FE) 325 MG: 325 (65 FE) TAB at 18:18

## 2019-06-23 RX ADMIN — VITAMIN D, TAB 1000IU (100/BT) 1000 UNITS: 25 TAB at 10:13

## 2019-06-23 RX ADMIN — ENOXAPARIN SODIUM 30 MG: 30 INJECTION, SOLUTION INTRAVENOUS; SUBCUTANEOUS at 13:13

## 2019-06-23 RX ADMIN — HYDROCHLOROTHIAZIDE 12.5 MG: 25 TABLET ORAL at 10:13

## 2019-06-23 RX ADMIN — ENOXAPARIN SODIUM 30 MG: 30 INJECTION, SOLUTION INTRAVENOUS; SUBCUTANEOUS at 23:32

## 2019-06-23 RX ADMIN — ENOXAPARIN SODIUM 30 MG: 30 INJECTION, SOLUTION INTRAVENOUS; SUBCUTANEOUS at 00:00

## 2019-06-23 RX ADMIN — HYDROCODONE BITARTRATE AND ACETAMINOPHEN 1 TABLET: 10; 325 TABLET ORAL at 10:13

## 2019-06-23 RX ADMIN — LOSARTAN POTASSIUM 50 MG: 50 TABLET, FILM COATED ORAL at 10:12

## 2019-06-23 RX ADMIN — Medication 500 MG: at 10:13

## 2019-06-23 NOTE — ROUTINE PROCESS
Bedside and Verbal shift change report given to Bobbi velasquez (oncoming nurse) by SHIRA joy rn (offgoing nurse). Report included the following information Kardex, MAR and Recent Results.

## 2019-06-23 NOTE — ROUTINE PROCESS
Bedside and Verbal shift change report given to SHIRA RamRN (oncoming nurse) by Taylor Anderson RN (offgoing nurse). Report included the following information SBAR, Kardex and Med Rec Status. QH visual checks and 24h chart checks done.

## 2019-06-24 LAB
ANION GAP SERPL CALC-SCNC: 4 MMOL/L (ref 3–18)
BASOPHILS # BLD: 0 K/UL (ref 0–0.1)
BASOPHILS NFR BLD: 0 % (ref 0–2)
BUN SERPL-MCNC: 21 MG/DL (ref 7–18)
BUN/CREAT SERPL: 20 (ref 12–20)
CALCIUM SERPL-MCNC: 9 MG/DL (ref 8.5–10.1)
CHLORIDE SERPL-SCNC: 103 MMOL/L (ref 100–108)
CO2 SERPL-SCNC: 31 MMOL/L (ref 21–32)
CREAT SERPL-MCNC: 1.07 MG/DL (ref 0.6–1.3)
DIFFERENTIAL METHOD BLD: ABNORMAL
EOSINOPHIL # BLD: 0.3 K/UL (ref 0–0.4)
EOSINOPHIL NFR BLD: 2 % (ref 0–5)
ERYTHROCYTE [DISTWIDTH] IN BLOOD BY AUTOMATED COUNT: 18.2 % (ref 11.6–14.5)
GLUCOSE SERPL-MCNC: 95 MG/DL (ref 74–99)
HCT VFR BLD AUTO: 28.2 % (ref 35–45)
HGB BLD-MCNC: 8.7 G/DL (ref 12–16)
LYMPHOCYTES # BLD: 2.8 K/UL (ref 0.9–3.6)
LYMPHOCYTES NFR BLD: 26 % (ref 21–52)
MCH RBC QN AUTO: 27.1 PG (ref 24–34)
MCHC RBC AUTO-ENTMCNC: 30.9 G/DL (ref 31–37)
MCV RBC AUTO: 87.9 FL (ref 74–97)
MONOCYTES # BLD: 1.2 K/UL (ref 0.05–1.2)
MONOCYTES NFR BLD: 11 % (ref 3–10)
NEUTS SEG # BLD: 6.5 K/UL (ref 1.8–8)
NEUTS SEG NFR BLD: 61 % (ref 40–73)
PLATELET # BLD AUTO: 456 K/UL (ref 135–420)
PMV BLD AUTO: 9.7 FL (ref 9.2–11.8)
POTASSIUM SERPL-SCNC: 4.2 MMOL/L (ref 3.5–5.5)
RBC # BLD AUTO: 3.21 M/UL (ref 4.2–5.3)
SODIUM SERPL-SCNC: 138 MMOL/L (ref 136–145)
WBC # BLD AUTO: 10.7 K/UL (ref 4.6–13.2)

## 2019-06-24 PROCEDURE — 80048 BASIC METABOLIC PNL TOTAL CA: CPT

## 2019-06-24 PROCEDURE — 36415 COLL VENOUS BLD VENIPUNCTURE: CPT

## 2019-06-24 PROCEDURE — 74011250636 HC RX REV CODE- 250/636: Performed by: NURSE PRACTITIONER

## 2019-06-24 PROCEDURE — 85025 COMPLETE CBC W/AUTO DIFF WBC: CPT

## 2019-06-24 PROCEDURE — 74011250637 HC RX REV CODE- 250/637: Performed by: INTERNAL MEDICINE

## 2019-06-24 PROCEDURE — 74011250637 HC RX REV CODE- 250/637: Performed by: NURSE PRACTITIONER

## 2019-06-24 RX ADMIN — HYDROCHLOROTHIAZIDE 12.5 MG: 25 TABLET ORAL at 10:25

## 2019-06-24 RX ADMIN — LOSARTAN POTASSIUM 50 MG: 50 TABLET, FILM COATED ORAL at 10:26

## 2019-06-24 RX ADMIN — FERROUS SULFATE TAB 325 MG (65 MG ELEMENTAL FE) 325 MG: 325 (65 FE) TAB at 17:58

## 2019-06-24 RX ADMIN — ENOXAPARIN SODIUM 30 MG: 30 INJECTION, SOLUTION INTRAVENOUS; SUBCUTANEOUS at 13:39

## 2019-06-24 RX ADMIN — VITAMIN D, TAB 1000IU (100/BT) 1000 UNITS: 25 TAB at 10:25

## 2019-06-24 RX ADMIN — HYDROCODONE BITARTRATE AND ACETAMINOPHEN 1 TABLET: 10; 325 TABLET ORAL at 21:15

## 2019-06-24 RX ADMIN — PANTOPRAZOLE SODIUM 20 MG: 20 TABLET, DELAYED RELEASE ORAL at 06:40

## 2019-06-24 RX ADMIN — Medication 1 TABLET: at 10:25

## 2019-06-24 RX ADMIN — DOCUSATE SODIUM 100 MG: 100 CAPSULE, LIQUID FILLED ORAL at 10:25

## 2019-06-24 RX ADMIN — HYDROCODONE BITARTRATE AND ACETAMINOPHEN 2 TABLET: 10; 325 TABLET ORAL at 14:21

## 2019-06-24 RX ADMIN — Medication 500 MG: at 10:25

## 2019-06-24 RX ADMIN — HYDROCODONE BITARTRATE AND ACETAMINOPHEN 1 TABLET: 10; 325 TABLET ORAL at 10:25

## 2019-06-24 RX ADMIN — LATANOPROST 1 DROP: 50 SOLUTION/ DROPS OPHTHALMIC at 17:59

## 2019-06-24 RX ADMIN — FERROUS SULFATE TAB 325 MG (65 MG ELEMENTAL FE) 325 MG: 325 (65 FE) TAB at 10:26

## 2019-06-24 NOTE — PROGRESS NOTES
Met with Ms. Walls at bedside. She reports she is feeling better. She has enjoyed the radio provided to her to listen at bedside. She declined any other activity needs at this time. Will continue to follow.

## 2019-06-24 NOTE — ROUTINE PROCESS
Patient request two pain pills tonight. Says she is hurting in RLE. Meds given as ordered. Will continue to monitor.

## 2019-06-24 NOTE — PROGRESS NOTES
[facility]    Daily progress Note    Patient: Flora Coppola MRN: [id number]  CSN: [id number]    YOB: 1945  Age: 68 y.o. Sex: female    DOA: 6/13/2019 LOS:  LOS: 11 days                    Subjective:     CC: follow up hemoglobin  Ms. Katie Hudson is a 68 yr female, who was recently hospitalized from 6/10-6/13. Patient with hx of severe right knee DJD, failed conservative management with ongoing pain meds. Patient with hx of intermitted falls. She was see by Ortho and she is now s/p right total knee arthroplasty and tolerated.  Patient was sent to [facility] on 6/13 for rehab. Since admitted, she has been stable, patient did required 1 units of PRBC for hemoglobin at 6.8. Her hemoglobin is 8.7 today, no active bleeding. She is alert and verbal, no active bleeding. VSS stable. Patient denies any issues.  She report that she is tolerating PT/OT                                            PAST MEDICAL hx: DM type 2, OA, Obesity, HTN,  Tricompartment osteoarthritis of knees, bilateral, FABIO - CPAP machine     PAST SURGICAL hx: Cataract removal, colonoscopy, hysterectomy, nephrectomy, rotator cuff repair right      SOCIAL hx : never smoked, not a drinker     FAMILY: DM      ALLERGIES: NKDA        ROS  CONST: Fever, weight loss, fatigue or chills  HEENT: Recent changes in vision, vertigo  CV: Chest pain, palpitations, edema and varicosities  RESP: Cough, shortness of breath, wheezing  GI: Nausea, vomiting, abdominal pain, change in bowel habits,  : Hematuria, dysuria, frequency  MS: Weakness, joint pain and arthritis  LYMPH/HEME: Anemia, bruising and history of blood transfusions  INTEG: Dermatitis, abnormal moles  NEURO: Dizziness, headache, fainting, seizures and stroke  PSYCH: Anxiety and depression     meds reviewed         Objective:      Visit Vitals  /69   Pulse 74   Temp 97.6 °F (36.4 °C)   Resp 19   Wt 95.3 kg (210 lb 3.2 oz)   SpO2 97%   BMI 45.49 kg/m²       Physical Exam:  General appearance: alert, cooperative, no distress, appears stated age  [de-identified]: negative  Neck: supple, symmetrical, trachea midline, no adenopathy, thyroid: not enlarged, symmetric, no tenderness/mass/nodules, no carotid bruit and no JVD  Lungs: clear to auscultation bilaterally  Heart: regular rate and rhythm, S1, S2 normal, no murmur, click, rub or gallop  Abdomen: soft, non-tender. Bowel sounds normal. No masses,  no organomegaly  Pulses: 2+ and symmetric  Skin: Skin color, texture, turgor normal. No rashes or lesions  RLE- surgical site stable    Intake and Output:  Current Shift:  No intake/output data recorded. Last three shifts:  No intake/output data recorded. Recent Results (from the past 24 hour(s))   CBC WITH AUTOMATED DIFF    Collection Time: 06/24/19  3:50 AM   Result Value Ref Range    WBC 10.7 4.6 - 13.2 K/uL    RBC 3.21 (L) 4.20 - 5.30 M/uL    HGB 8.7 (L) 12.0 - 16.0 g/dL    HCT 28.2 (L) 35.0 - 45.0 %    MCV 87.9 74.0 - 97.0 FL    MCH 27.1 24.0 - 34.0 PG    MCHC 30.9 (L) 31.0 - 37.0 g/dL    RDW 18.2 (H) 11.6 - 14.5 %    PLATELET 787 (H) 151 - 420 K/uL    MPV 9.7 9.2 - 11.8 FL    NEUTROPHILS 61 40 - 73 %    LYMPHOCYTES 26 21 - 52 %    MONOCYTES 11 (H) 3 - 10 %    EOSINOPHILS 2 0 - 5 %    BASOPHILS 0 0 - 2 %    ABS. NEUTROPHILS 6.5 1.8 - 8.0 K/UL    ABS. LYMPHOCYTES 2.8 0.9 - 3.6 K/UL    ABS. MONOCYTES 1.2 0.05 - 1.2 K/UL    ABS. EOSINOPHILS 0.3 0.0 - 0.4 K/UL    ABS.  BASOPHILS 0.0 0.0 - 0.1 K/UL    DF AUTOMATED     METABOLIC PANEL, BASIC    Collection Time: 06/24/19  3:50 AM   Result Value Ref Range    Sodium 138 136 - 145 mmol/L    Potassium 4.2 3.5 - 5.5 mmol/L    Chloride 103 100 - 108 mmol/L    CO2 31 21 - 32 mmol/L    Anion gap 4 3.0 - 18 mmol/L    Glucose 95 74 - 99 mg/dL    BUN 21 (H) 7.0 - 18 MG/DL    Creatinine 1.07 0.6 - 1.3 MG/DL    BUN/Creatinine ratio 20 12 - 20      GFR est AA >60 >60 ml/min/1.73m2    GFR est non-AA 50 (L) >60 ml/min/1.73m2    Calcium 9.0 8.5 - 10.1 MG/DL         Current Facility-Administered Medications:     [facility] ANESTHESIA, , Other, PRN, Iman Tidwell MD    [facility] EMERGENCY/STAT BOX, , Other, PRN, Iman Tidwell MD    enoxaparin (LOVENOX) injection 30 mg, 30 mg, SubCUTAneous, Q12H, Tayler Ritter NP, 30 mg at 06/23/19 2332    pantoprazole (PROTONIX) tablet 20 mg, 20 mg, Oral, ACB, Iman Tidwell MD, 20 mg at 06/24/19 0640    alum-mag hydroxide-simeth (MYLANTA) oral suspension 30 mL, 30 mL, Oral, Q4H PRN, Guy Love MD, 30 mL at 06/19/19 1357    ferrous sulfate tablet 325 mg, 1 Tab, Oral, BID WITH MEALS, Tayler Ritter NP, 325 mg at 06/24/19 1026    ascorbic acid (vitamin C) (VITAMIN C) tablet 500 mg, 500 mg, Oral, DAILY, Tayler Ritter NP, 500 mg at 06/24/19 1025    latanoprost (XALATAN) 0.005 % ophthalmic solution 1 Drop  (Patient Supplied), 1 Drop, Right Eye, QPM, Iman Tidwell MD, 1 Drop at 06/23/19 1818    bisacodyl (DULCOLAX) suppository 10 mg, 10 mg, Rectal, DAILY PRN, Iman Tidwell MD    magnesium hydroxide (MILK OF MAGNESIA) 400 mg/5 mL oral suspension 30 mL, 30 mL, Oral, DAILY PRN, Iman Tidwell MD, 30 mL at 06/14/19 0635    docusate sodium (COLACE) capsule 100 mg, 100 mg, Oral, DAILY, Tayler Ritter NP, 100 mg at 06/24/19 1025    polyethylene glycol (MIRALAX) packet 17 g, 17 g, Oral, DAILY, Tayler Ritter NP, 17 g at 06/22/19 0920    HYDROcodone-acetaminophen (NORCO)  mg tablet 1 Tab, 1 Tab, Oral, Q4H PRN, Iman Tidwell MD, 1 Tab at 06/24/19 1025    HYDROcodone-acetaminophen (NORCO)  mg tablet 2 Tab, 2 Tab, Oral, Q4H PRN, Iman Tidwell MD, 2 Tab at 06/23/19 2113    naloxone (NARCAN) injection 0.4 mg, 0.4 mg, IntraVENous, PRN, Iman Tidwell MD    cholecalciferol (VITAMIN D3) tablet 1,000 Units, 1,000 Units, Oral, DAILY, Guy Love MD, 1,000 Units at 06/24/19 1025    diclofenac (VOLTAREN) 1 % topical gel 2 g, 2 g, Topical, QID PRN, Iman Tidwell MD    losartan (COZAAR) tablet 50 mg, 50 mg, Oral, DAILY, Guy Love MD, 50 mg at 06/24/19 1026    hydroCHLOROthiazide (HYDRODIURIL) tablet 12.5 mg, 12.5 mg, Oral, DAILY, Paco Cummings MD, 12.5 mg at 06/24/19 1025    calcium-vitamin D 600 mg(1,500mg) -200 unit per tablet 1 Tab, 1 Tab, Oral, DAILY, Paco Cummings MD, 1 Tab at 06/24/19 1025    Facility-Administered Medications Ordered in Other Encounters:     sodium chloride (NS) flush 10-40 mL, 10-40 mL, IntraVENous, PRN, Paco Cummings MD, 10 mL at 06/21/19 1722    Lab Results   Component Value Date/Time    Glucose 95 06/24/2019 03:50 AM    Glucose 94 06/17/2019 04:55 AM    Glucose 93 06/14/2019 06:06 AM    Glucose 116 (H) 06/12/2019 05:15 AM    Glucose 93 05/29/2019 08:56 AM        Assessment/Plan   severe right knee DJD,: she is s/p TOTAL KNEE ARTHROPLASTY [25652] (KNEE ARTHROPLASTY TOTAL on 6/11.  Continue Lovenox  Times 14 days, pain  Mgt     Anemia probable from surgery: s/p 1 unit of PRBC, h/h stable now, will monitor         Constipation : will continue colace and Miralax and monitor      DM type 2 : hgab1c is 5.7, diet control, no meds.      Full code- confirm    Denies being depressed     Decon: PT/OT       Jeremías Vela NP  6/24/2019, 12:05 PM

## 2019-06-25 PROCEDURE — 74011250636 HC RX REV CODE- 250/636: Performed by: NURSE PRACTITIONER

## 2019-06-25 PROCEDURE — 74011250637 HC RX REV CODE- 250/637: Performed by: NURSE PRACTITIONER

## 2019-06-25 PROCEDURE — 74011250637 HC RX REV CODE- 250/637: Performed by: INTERNAL MEDICINE

## 2019-06-25 RX ADMIN — HYDROCODONE BITARTRATE AND ACETAMINOPHEN 1 TABLET: 10; 325 TABLET ORAL at 08:43

## 2019-06-25 RX ADMIN — HYDROCHLOROTHIAZIDE 12.5 MG: 25 TABLET ORAL at 08:43

## 2019-06-25 RX ADMIN — ENOXAPARIN SODIUM 30 MG: 30 INJECTION, SOLUTION INTRAVENOUS; SUBCUTANEOUS at 23:53

## 2019-06-25 RX ADMIN — HYDROCODONE BITARTRATE AND ACETAMINOPHEN 2 TABLET: 10; 325 TABLET ORAL at 21:51

## 2019-06-25 RX ADMIN — VITAMIN D, TAB 1000IU (100/BT) 1000 UNITS: 25 TAB at 08:44

## 2019-06-25 RX ADMIN — FERROUS SULFATE TAB 325 MG (65 MG ELEMENTAL FE) 325 MG: 325 (65 FE) TAB at 08:43

## 2019-06-25 RX ADMIN — LOSARTAN POTASSIUM 50 MG: 50 TABLET, FILM COATED ORAL at 08:43

## 2019-06-25 RX ADMIN — ENOXAPARIN SODIUM 30 MG: 30 INJECTION, SOLUTION INTRAVENOUS; SUBCUTANEOUS at 00:00

## 2019-06-25 RX ADMIN — ENOXAPARIN SODIUM 30 MG: 30 INJECTION, SOLUTION INTRAVENOUS; SUBCUTANEOUS at 12:54

## 2019-06-25 RX ADMIN — HYDROCODONE BITARTRATE AND ACETAMINOPHEN 1 TABLET: 10; 325 TABLET ORAL at 13:01

## 2019-06-25 RX ADMIN — FERROUS SULFATE TAB 325 MG (65 MG ELEMENTAL FE) 325 MG: 325 (65 FE) TAB at 18:21

## 2019-06-25 RX ADMIN — Medication 500 MG: at 08:42

## 2019-06-25 RX ADMIN — LATANOPROST 1 DROP: 50 SOLUTION/ DROPS OPHTHALMIC at 00:00

## 2019-06-25 RX ADMIN — PANTOPRAZOLE SODIUM 20 MG: 20 TABLET, DELAYED RELEASE ORAL at 08:44

## 2019-06-25 RX ADMIN — Medication 1 TABLET: at 08:43

## 2019-06-25 NOTE — PROGRESS NOTES
Ms. Olivier Parada had appointment yesterday with sierra she returned with orders for aggressive ROM 2  times a day 0-90degrees over next 2 weeks she will follow up in the clinic in 2 weeks and she needs to continue with her B donna hose thigh high, resident daughter went with her to the appointment.

## 2019-06-25 NOTE — ROUTINE PROCESS
Bedside and Verbal shift change report given to john stearnsn (oncoming nurse) by jade mcgregor (offgoing nurse). Report included the following information Kardex, MAR and Recent Results.

## 2019-06-25 NOTE — PROGRESS NOTES
Communication with [address] [place] [address] [facility] has been ongoing regarding bundle patient. I informed her on Wednesday 6/19/19 that her H&H had dropped preventing therapy to work with her due to the specifications and that she was having pain control issues. She inquired about H&H as we  as what we were doing for pain. I informed her it was 6.8 and the NP was going to talk to her about a transfusion. She is receiving her pain medications closer to therapy time to help coordinate with the pain. On Friday 6/21/2019 Ms. Davin Dodge was updated the patient was having her transfusion. On Monday I informed her Ms. Walls looked better and is working with therapy. I explained Saturday is her religous day and she does not want therapy on that day. It was also explained we do not have a discharge dateat this time. She then contacted me today 6/25/2019 inquiring of a discharge date at which time I explained the medial set back has caused some regression but she is working with therapy as best she can. I informed her as soon as we had an anticipated date she would be updated. Spoke with Joie Lara with [facility] and explained the same. Will continue to follow.

## 2019-06-25 NOTE — ROUTINE PROCESS
Patient requested pain medication for mild discomfort. Ambulated to bathroom with stand by assist.  Cordell hose on and (R) leg elevated on pillow.

## 2019-06-26 PROCEDURE — 74011250637 HC RX REV CODE- 250/637: Performed by: NURSE PRACTITIONER

## 2019-06-26 PROCEDURE — 74011250636 HC RX REV CODE- 250/636: Performed by: NURSE PRACTITIONER

## 2019-06-26 PROCEDURE — 74011250637 HC RX REV CODE- 250/637: Performed by: INTERNAL MEDICINE

## 2019-06-26 RX ADMIN — HYDROCODONE BITARTRATE AND ACETAMINOPHEN 1 TABLET: 10; 325 TABLET ORAL at 12:58

## 2019-06-26 RX ADMIN — LOSARTAN POTASSIUM 50 MG: 50 TABLET, FILM COATED ORAL at 08:59

## 2019-06-26 RX ADMIN — FERROUS SULFATE TAB 325 MG (65 MG ELEMENTAL FE) 325 MG: 325 (65 FE) TAB at 17:45

## 2019-06-26 RX ADMIN — ENOXAPARIN SODIUM 30 MG: 30 INJECTION, SOLUTION INTRAVENOUS; SUBCUTANEOUS at 12:49

## 2019-06-26 RX ADMIN — PANTOPRAZOLE SODIUM 20 MG: 20 TABLET, DELAYED RELEASE ORAL at 08:59

## 2019-06-26 RX ADMIN — HYDROCHLOROTHIAZIDE 12.5 MG: 25 TABLET ORAL at 08:58

## 2019-06-26 RX ADMIN — FERROUS SULFATE TAB 325 MG (65 MG ELEMENTAL FE) 325 MG: 325 (65 FE) TAB at 08:58

## 2019-06-26 RX ADMIN — DOCUSATE SODIUM 100 MG: 100 CAPSULE, LIQUID FILLED ORAL at 09:00

## 2019-06-26 RX ADMIN — LATANOPROST 1 DROP: 50 SOLUTION/ DROPS OPHTHALMIC at 17:46

## 2019-06-26 RX ADMIN — ENOXAPARIN SODIUM 30 MG: 30 INJECTION, SOLUTION INTRAVENOUS; SUBCUTANEOUS at 23:49

## 2019-06-26 RX ADMIN — VITAMIN D, TAB 1000IU (100/BT) 1000 UNITS: 25 TAB at 08:59

## 2019-06-26 RX ADMIN — HYDROCODONE BITARTRATE AND ACETAMINOPHEN 1 TABLET: 10; 325 TABLET ORAL at 08:11

## 2019-06-26 RX ADMIN — Medication 1 TABLET: at 08:59

## 2019-06-26 RX ADMIN — HYDROCODONE BITARTRATE AND ACETAMINOPHEN 1 TABLET: 10; 325 TABLET ORAL at 21:40

## 2019-06-26 RX ADMIN — Medication 500 MG: at 08:58

## 2019-06-26 NOTE — ROUTINE PROCESS
Pt states no change in pain, 4/10 on pain number scale. Pt states \"I need a pain pain pill before I have therapy\".

## 2019-06-26 NOTE — PROGRESS NOTES
Admission Assessment completed; required extensive assistance with ADLS  Limit assistance with meals per observational period.

## 2019-06-26 NOTE — ROUTINE PROCESS
Bedside and Verbal shift change report given to Mariann Arroyo LPN (oncoming nurse) by Rebeca Link LPN (offgoing nurse). Report included the following information SBAR, Kardex and MAR.

## 2019-06-26 NOTE — PROGRESS NOTES
Pt slept quietly through out the night, cpap intact, no c/o pain, call bell and personal items within reach

## 2019-06-26 NOTE — ROUTINE PROCESS
Bedside and Verbal shift change report given to [address] (oncoming nurse) by Nia Matson RN (offgoing nurse). Report included the following information SBAR, Kardex and MAR.

## 2019-06-26 NOTE — ROUTINE PROCESS
Bedside and Verbal shift change report given to Josesito López LPN (oncoming nurse) by Altagracia Guevara LPN (offgoing nurse). Report included the following information SBAR, Kardex and MAR.

## 2019-06-26 NOTE — PROGRESS NOTES
Nutrition follow-up/Plan of care [facility]      RECOMMENDATIONS:   1. Cardiac Diet  2. Monitor labs, weight and PO intake  3. RD to follow     GOALS:   1. Met/Ongoing: PO intake meets >75% of protein/calorie needs by 7/3  2. Ongoing: Weight Maintenance/gradual loss (1-2 lb/week) by 7/3    ASSESSMENT:   Wt status is classified as obese per Body mass index is 42.72 kg/m². Adequate PO intake. Labs noted. Pt w/ H/H (8.7/28.2) and elevated BUN. Nutrition recommendations listed. RD to follow. Nutrition Risk:  [] High  [] Moderate [x]  Low    SUBJECTIVE/OBJECTIVE:   (6/26): Pt seen in room OOB in chair later in the day. Reports she is feeling better and has more energy now. Appetite/PO intake is good. Last BM on (6/25) per I/Os. Pt w/ an 11 lb or 5.3% loss from admission weight, but stable with UBW; variable weights recorded. Will continue to monitor. (6/20): Noted weight increase of 2 lb or 0.9% from admission weight; ?? fluid. Last BM on (6/19) per I/Os. Noted Pt still with low H/H so plan is to get PRBC transfusion. Pt seen in room OOB in chair after lunch; observed 90% of meal consumed. Reports she still does not have an appetite but is still eating her meals. Encouraged to consume what she can and will see how she feels after being transfused. Will continue to monitor. (6/14): Pt transferred from [facility] to [facility] after being admitted for right total knee replacement. Pt is s/p right total KNEE ARTHROPLASTY with prp with femoral on (6/10). Last BM on (6/10); MOM given today. Pt seen in room later in the day relaxing. Denies having any food allergies or problems chewing/swallowing. Reports having a good appetite and consuming 3 meals per day at home. Stated she was seeing an outpatient RD prior to this. Explained that she was ordered for a cardiac diet and provided a menu to implement preferences. Discussed adequate fluid and fiber intake in diet to assist with constipation. Will continue to monitor. Information Obtained from:    [x] Chart Review   [x] Patient   [] Family/Caregiver   [] Nurse/Physician   [x] Interdisciplinary Meeting/Rounds      Diet: Cardiac Diet  Medications: [x] Reviewed    Allergies: [x] Reviewed   Patient Active Problem List   Diagnosis Code    Obesity, morbid ([facility] [address] E66.01    Absent kidney, acquired Z90.5    Essential hypertension I10    Obstructive sleep apnea G47.33    Tricompartment osteoarthritis of knees, bilateral M17.0    Type 2 diabetes mellitus without complication ([address] D92.6    Morbid obesity ([address] E66.01    Osteoarthritis of knee M17.10    Right knee DJD M17.11    Acute blood loss as cause of postoperative anemia D62       Past Medical History:   Diagnosis Date    Arthritis     Diabetes ([facility] [address] 08/2016    no meds    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     Hypertension     Morbid obesity ([address]     Sleep apnea     CPAP machine    Use of cane as ambulatory aid       Labs:    Lab Results   Component Value Date/Time    Sodium 138 06/24/2019 03:50 AM    Potassium 4.2 06/24/2019 03:50 AM    Chloride 103 06/24/2019 03:50 AM    CO2 31 06/24/2019 03:50 AM    Anion gap 4 06/24/2019 03:50 AM    Glucose 95 06/24/2019 03:50 AM    BUN 21 (H) 06/24/2019 03:50 AM    Creatinine 1.07 06/24/2019 03:50 AM    Calcium 9.0 06/24/2019 03:50 AM    Magnesium 2.3 03/22/2019 09:41 AM    Albumin 3.5 05/29/2019 08:56 AM     Anthropometrics: BMI (calculated): 42.7  Last 3 Recorded Weights in this Encounter    06/13/19 1420 06/17/19 1033 06/25/19 1431   Weight: 94.3 kg (208 lb) 95.3 kg (210 lb 3.2 oz) 89.5 kg (197 lb 6.4 oz)      Ht Readings from Last 1 Encounters:   05/29/19 4' 9\" (1.448 m)     Weight Metrics 6/25/2019 6/10/2019 5/22/2019 5/22/2019 4/26/2019 4/26/2019 3/26/2019   Weight 197 lb 6.4 oz 195 lb 7 oz - 196 lb - 197 lb -   Waist Measure Inches - - 42 - 40 - 44.5   BMI 42.72 kg/m2 42.29 kg/m2 - 42.41 kg/m2 - 42.63 kg/m2 -       No data found.     UBW: 195-200 lb  [x] Weight Loss (11 lb or 5.3% from admission weight, but stable with UBW; variable weights recorded)  [] Weight Gain   [] Weight Stable    Estimated Nutrition Needs: [x] MSJ  [] Other:  Calories: 4766-5896 kcal Based on:   [x] Actual BW    Protein:   76-95 g Based on:   [x] Actual BW    Fluid:       1151-0557 ml Based on:   [x] Actual BW        Nutrition Problems Identified:   [] Suboptimal PO intake   [] Food Allergies  [] Difficulty chewing/swallowing/poor dentition  [] Constipation/Diarrhea (Last BM on 6/25; bowel regimen in place)   [] Nausea/Vomiting   [] None  [] Other:     Plan:   [x] Therapeutic Diet  [x]  Obtained/adjusted food preferences/tolerances and/or snacks options   []  Supplements added   [] Occupational therapy following for feeding techniques  []  HS snack added   []  Modify diet texture   []  Modify diet for food allergies   []  Educate patient   []  Assist with menu selection   [x]  Monitor PO intake on meal rounds   [x]  Continue inpatient monitoring and intervention   [x]  Participated in discharge planning/Interdisciplinary rounds/Team meetings   []  Other:     Education Needs:   [] Not appropriate for teaching at this time due to:   [x] Identified and addressed    Nutrition Monitoring and Evaluation:  [x] Continue ongoing monitoring and intervention  [] Other    Ari Gaviria

## 2019-06-27 ENCOUNTER — PATIENT OUTREACH (OUTPATIENT)
Dept: CASE MANAGEMENT | Age: 74
End: 2019-06-27

## 2019-06-27 PROCEDURE — 74011250637 HC RX REV CODE- 250/637: Performed by: INTERNAL MEDICINE

## 2019-06-27 PROCEDURE — 74011250637 HC RX REV CODE- 250/637: Performed by: NURSE PRACTITIONER

## 2019-06-27 PROCEDURE — 74011250636 HC RX REV CODE- 250/636: Performed by: NURSE PRACTITIONER

## 2019-06-27 RX ORDER — HYDROCODONE BITARTRATE AND ACETAMINOPHEN 10; 325 MG/1; MG/1
1 TABLET ORAL
Qty: 15 TAB | Refills: 0 | Status: SHIPPED | OUTPATIENT
Start: 2019-06-27 | End: 2019-07-27

## 2019-06-27 RX ORDER — DOCUSATE SODIUM 100 MG/1
100 CAPSULE, LIQUID FILLED ORAL DAILY
Qty: 30 CAP | Refills: 0 | Status: SHIPPED | OUTPATIENT
Start: 2019-06-28 | End: 2019-09-26

## 2019-06-27 RX ORDER — POLYETHYLENE GLYCOL 3350 17 G/17G
17 POWDER, FOR SOLUTION ORAL DAILY
Qty: 30 PACKET | Refills: 0 | Status: SHIPPED | OUTPATIENT
Start: 2019-06-28

## 2019-06-27 RX ORDER — LANOLIN ALCOHOL/MO/W.PET/CERES
325 CREAM (GRAM) TOPICAL 2 TIMES DAILY WITH MEALS
Qty: 60 TAB | Refills: 0 | Status: SHIPPED | OUTPATIENT
Start: 2019-06-27

## 2019-06-27 RX ORDER — ASCORBIC ACID 500 MG
500 TABLET ORAL DAILY
Qty: 30 TAB | Refills: 0 | Status: SHIPPED | OUTPATIENT
Start: 2019-06-28

## 2019-06-27 RX ADMIN — FERROUS SULFATE TAB 325 MG (65 MG ELEMENTAL FE) 325 MG: 325 (65 FE) TAB at 17:46

## 2019-06-27 RX ADMIN — POLYETHYLENE GLYCOL 3350 17 G: 17 POWDER, FOR SOLUTION ORAL at 09:00

## 2019-06-27 RX ADMIN — HYDROCHLOROTHIAZIDE 12.5 MG: 25 TABLET ORAL at 08:34

## 2019-06-27 RX ADMIN — Medication 500 MG: at 08:34

## 2019-06-27 RX ADMIN — HYDROCODONE BITARTRATE AND ACETAMINOPHEN 1 TABLET: 10; 325 TABLET ORAL at 21:33

## 2019-06-27 RX ADMIN — LOSARTAN POTASSIUM 50 MG: 50 TABLET, FILM COATED ORAL at 08:34

## 2019-06-27 RX ADMIN — Medication 1 TABLET: at 08:34

## 2019-06-27 RX ADMIN — FERROUS SULFATE TAB 325 MG (65 MG ELEMENTAL FE) 325 MG: 325 (65 FE) TAB at 08:34

## 2019-06-27 RX ADMIN — VITAMIN D, TAB 1000IU (100/BT) 1000 UNITS: 25 TAB at 08:33

## 2019-06-27 RX ADMIN — PANTOPRAZOLE SODIUM 20 MG: 20 TABLET, DELAYED RELEASE ORAL at 06:41

## 2019-06-27 RX ADMIN — ENOXAPARIN SODIUM 30 MG: 30 INJECTION, SOLUTION INTRAVENOUS; SUBCUTANEOUS at 12:05

## 2019-06-27 RX ADMIN — HYDROCODONE BITARTRATE AND ACETAMINOPHEN 1 TABLET: 10; 325 TABLET ORAL at 12:06

## 2019-06-27 RX ADMIN — DOCUSATE SODIUM 100 MG: 100 CAPSULE, LIQUID FILLED ORAL at 08:34

## 2019-06-27 RX ADMIN — LATANOPROST 1 DROP: 50 SOLUTION/ DROPS OPHTHALMIC at 17:47

## 2019-06-27 RX ADMIN — HYDROCODONE BITARTRATE AND ACETAMINOPHEN 1 TABLET: 10; 325 TABLET ORAL at 08:33

## 2019-06-27 NOTE — PROGRESS NOTES
Met with Ms. Walls at bedside. Discussed discharge and she would like to discharge Saturday. Wendy 30 reviewed and signed, copy provided. She has a walker at home. [facility] will provide [address]. She is satisfied with her radio and di need any additional activities provided. Will continue to follow.

## 2019-06-27 NOTE — DISCHARGE SUMMARY
[facility]     Discharge Summary    Patient: Ruben Holley MRN: [id number]  CSN: [id number]    YOB: 1945  Age: 68 y.o. Sex: female    DOA: 6/13/2019 LOS:  LOS: 14 days   Discharge Date:      Admission Diagnoses: right total knee replacement    Discharge Diagnoses:    Problem List as of 6/27/2019 Date Reviewed: 11/15/2018          Codes Class Noted - Resolved    Acute blood loss as cause of postoperative anemia ICD-10-CM: D62  ICD-9-CM: 285.1  6/13/2019 - Present        Osteoarthritis of knee ICD-10-CM: M17.10  ICD-9-CM: 715.36  6/10/2019 - Present        Right knee DJD ICD-10-CM: M17.11  ICD-9-CM: 715.96  6/10/2019 - Present        Absent kidney, acquired ICD-10-CM: Z90.5  ICD-9-CM: V45.73  3/28/2019 - Present        Obesity, morbid ([facility] ICD-10-CM: E66.01  ICD-9-CM: 278.01  11/14/2018 - Present        Essential hypertension ICD-10-CM: I10  ICD-9-CM: 401.9  12/31/2016 - Present        Type 2 diabetes mellitus without complication ([facility] XYM-51-FO: E11.9  ICD-9-CM: 250.00  6/22/2016 - Present        Obstructive sleep apnea ICD-10-CM: G47.33  ICD-9-CM: 327.23  1/5/2016 - Present    Overview Signed 3/28/2019  6:07 PM by Ralf Iniguez NP     Overview:   1/20/2016 AHI/RDI: 6                   REM AHI: 27               SUPINE AHI: 10 SpO2 eli: 81% PLMI: 4      PAP titration Date: 3/15/2016  CPAP11 cm H2O (AHI: 0; SpO2 eli: 93%)  CPAP: 11 cm H2O PLMI: 3    CPAP 11  DME: [facility]             Morbid obesity ([facility] ICD-10-CM: E66.01  ICD-9-CM: 278.01  1/5/2016 - Present        Tricompartment osteoarthritis of knees, bilateral ICD-10-CM: M17.0  ICD-9-CM: 715.96  1/1/2016 - Present              Discharge Condition: Good    Discharge To: Home    Hospital Course: Ms. Amira Dent is a 68 yr female, who was recently hospitalized from 6/10-6/13. Patient with hx of severe right knee DJD, failed conservative management with ongoing pain meds. Patient with hx of intermitted falls.  She was see by Ortho and she is now s/p right total knee arthroplasty and tolerated.  Patient was sent to [facility] on 6/13 for rehab.      Since admitted, she has been stable, patient did required 1 units of PRBC for hemoglobin at 6.8. Her hemoglobin is 8.7 today, no active bleeding. She is alert and verbal, no active bleeding. VSS stable. Patient denies any issues. She report that she is tolerating . She denies any bowel issues. No ER visit or re-hospitalization. Patient is stable for discharge home with [facility]                                        PAST MEDICAL hx: DM type 2, OA, Obesity, HTN,  Tricompartment osteoarthritis of knees, bilateral, FABIO - CPAP machine     PAST SURGICAL hx: Cataract removal, colonoscopy, hysterectomy, nephrectomy, rotator cuff repair right      SOCIAL hx : never smoked, not a drinker     FAMILY: DM      ALLERGIES: NKDA        ROS  CONST: Fever, weight loss, fatigue or chills  HEENT: Recent changes in vision, vertigo  CV: Chest pain, palpitations, edema and varicosities  RESP: Cough, shortness of breath, wheezing  GI: Nausea, vomiting, abdominal pain, change in bowel habits,  : Hematuria, dysuria, frequency  MS: Weakness, joint pain and arthritis  LYMPH/HEME: Anemia, bruising and history of blood transfusions  INTEG: Dermatitis, abnormal moles  NEURO: Dizziness, headache, fainting, seizures and stroke  PSYCH: Anxiety and depression     meds reviewed           Objective:      Visit Vitals  /69   Pulse 74   Temp 97.6 °F (36.4 °C)   Resp 19   Wt 95.3 kg (210 lb 3.2 oz)   SpO2 97%   BMI 45.49 kg/m²         Physical Exam:  General appearance: alert, cooperative, no distress, appears stated age  HEENT: negative  Neck: supple, symmetrical, trachea midline, no adenopathy, thyroid: not enlarged, symmetric, no tenderness/mass/nodules, no carotid bruit and no JVD  Lungs: clear to auscultation bilaterally  Heart: regular rate and rhythm, S1, S2 normal, no murmur, click, rub or gallop  Abdomen: soft, non-tender. Bowel sounds normal. No masses,  no organomegaly  Pulses: 2+ and symmetric  Skin: Skin color, texture, turgor normal. No rashes or lesions  RLE- surgical site stable             Consults: None    Significant Diagnostic Studies: labs:  See below    Discharge Medications:     Current Discharge Medication List      START taking these medications    Details   polyethylene glycol (MIRALAX) 17 gram packet Take 1 Packet by mouth daily. Qty: 30 Packet, Refills: 0      docusate sodium (COLACE) 100 mg capsule Take 1 Cap by mouth daily for 90 days. Qty: 30 Cap, Refills: 0      ferrous sulfate 325 mg (65 mg iron) tablet Take 1 Tab by mouth two (2) times daily (with meals). Qty: 60 Tab, Refills: 0      ascorbic acid, vitamin C, (VITAMIN C) 500 mg tablet Take 1 Tab by mouth daily. Qty: 30 Tab, Refills: 0         CONTINUE these medications which have CHANGED    Details   HYDROcodone-acetaminophen (NORCO)  mg tablet Take 1 Tab by mouth every eight (8) hours as needed for Pain for up to 30 days. Max Daily Amount: 3 Tabs. Qty: 15 Tab, Refills: 0    Associated Diagnoses: Tricompartment osteoarthritis of knees, bilateral; Osteoarthritis of knee, unspecified laterality, unspecified osteoarthritis type; Osteoarthritis of right knee, unspecified osteoarthritis type         CONTINUE these medications which have NOT CHANGED    Details   losartan-hydroCHLOROthiazide (HYZAAR) 50-12.5 mg per tablet Take 1 Tab by mouth daily. latanoprost (XALATAN) 0.005 % ophthalmic solution Administer 1 Drop to right eye nightly. cholecalciferol (VITAMIN D3) 1,000 unit tablet 1,000 Units. calcium-cholecalciferol, d3, (CALCIUM 600 + D) 600-125 mg-unit tab Take  by mouth daily. diclofenac (VOLTAREN) 1 % gel Apply 2 g to affected area as needed.  Indications: OSTEOARTHRITIS         STOP taking these medications       enoxaparin (LOVENOX) 30 mg/0.3 mL injection Comments:   Reason for Stopping:         naloxone (NARCAN) 4 mg/actuation nasal spray Comments:   Reason for Stopping:           Results for Jocelin Langston (MRN [id number]) as of 6/27/2019 12:20   Ref. Range 6/24/2019 03:50   WBC Latest Ref Range: 4.6 - 13.2 K/uL 10.7   RBC Latest Ref Range: 4.20 - 5.30 M/uL 3.21 (L)   HGB Latest Ref Range: 12.0 - 16.0 g/dL 8.7 (L)   HCT Latest Ref Range: 35.0 - 45.0 % 28.2 (L)   MCV Latest Ref Range: 74.0 - 97.0 FL 87.9   MCH Latest Ref Range: 24.0 - 34.0 PG 27.1   MCHC Latest Ref Range: 31.0 - 37.0 g/dL 30.9 (L)   RDW Latest Ref Range: 11.6 - 14.5 % 18.2 (H)   PLATELET Latest Ref Range: 135 - 420 K/uL 456 (H)   MPV Latest Ref Range: 9.2 - 11.8 FL 9.7   NEUTROPHILS Latest Ref Range: 40 - 73 % 61   LYMPHOCYTES Latest Ref Range: 21 - 52 % 26   MONOCYTES Latest Ref Range: 3 - 10 % 11 (H)   EOSINOPHILS Latest Ref Range: 0 - 5 % 2   BASOPHILS Latest Ref Range: 0 - 2 % 0   DF Latest Units:   AUTOMATED   ABS. NEUTROPHILS Latest Ref Range: 1.8 - 8.0 K/UL 6.5   ABS. LYMPHOCYTES Latest Ref Range: 0.9 - 3.6 K/UL 2.8   ABS. MONOCYTES Latest Ref Range: 0.05 - 1.2 K/UL 1.2   ABS. EOSINOPHILS Latest Ref Range: 0.0 - 0.4 K/UL 0.3   ABS. BASOPHILS Latest Ref Range: 0.0 - 0.1 K/UL 0.0   Sodium Latest Ref Range: 136 - 145 mmol/L 138   Potassium Latest Ref Range: 3.5 - 5.5 mmol/L 4.2   Chloride Latest Ref Range: 100 - 108 mmol/L 103   CO2 Latest Ref Range: 21 - 32 mmol/L 31   Anion gap Latest Ref Range: 3.0 - 18 mmol/L 4   Glucose Latest Ref Range: 74 - 99 mg/dL 95   BUN Latest Ref Range: 7.0 - 18 MG/DL 21 (H)   Creatinine Latest Ref Range: 0.6 - 1.3 MG/DL 1.07   BUN/Creatinine ratio Latest Ref Range: 12 - 20   20   Calcium Latest Ref Range: 8.5 - 10.1 MG/DL 9.0   GFR est non-AA Latest Ref Range: >60 ml/min/1.73m2 50 (L)   GFR est AA Latest Ref Range: >60 ml/min/1.73m2 >60     Results for Jocelin Langston (MRN [id number]) as of 6/27/2019 12:20   Ref.  Range 6/18/2019 06:00 6/24/2019 03:50   Sodium Latest Ref Range: 136 - 145 mmol/L  138   Potassium Latest Ref Range: 3.5 - 5.5 mmol/L  4.2   Chloride Latest Ref Range: 100 - 108 mmol/L  103   CO2 Latest Ref Range: 21 - 32 mmol/L  31   Anion gap Latest Ref Range: 3.0 - 18 mmol/L  4   Glucose Latest Ref Range: 74 - 99 mg/dL  95   BUN Latest Ref Range: 7.0 - 18 MG/DL  21 (H)   Creatinine Latest Ref Range: 0.6 - 1.3 MG/DL  1.07   BUN/Creatinine ratio Latest Ref Range: 12 - 20    20   Calcium Latest Ref Range: 8.5 - 10.1 MG/DL  9.0   GFR est non-AA Latest Ref Range: >60 ml/min/1.73m2  50 (L)   GFR est AA Latest Ref Range: >60 ml/min/1.73m2  >60   Iron Latest Ref Range: 50 - 175 ug/dL 63    TIBC Latest Ref Range: 250 - 450 ug/dL 251    Iron % saturation Latest Units: % 25      ASSESSMENT/PLANS  severe right knee DJD,: she is s/p TOTAL KNEE ARTHROPLASTY [95850] (KNEE ARTHROPLASTY TOTAL on 6/11. Continue Lovenox  Times 14 days ending 6/29 , pain  Mgt     Anemia probable from surgery: s/p 1 unit of PRBC, h/h stable now, will monitor. Patient will need OP follow up on CBC        Constipation :resolved  will continue colace and Miralax and monitor      DM type 2 : hgab1c is 5.7, diet control, no meds.      Full code- confirm     Denies being depressed       DME:   Home Health : Pt/OT  Activity: Activity as tolerated  Diet: Regular Diet  Wound Care: Keep wound clean and dry  DME  Follow-up: follow up with Orthopedic on 7/10 Darrian , follow up with PCP in 7-10 day, recommended getting CBC check in 1 week    Time spent > 30 mins      face to face  I certified that medical conditions listed necessitate [address] fall risk  Generalized weakness p TOTAL KNEE ARTHROPLASTY [16183] (KNEE ARTHROPLASTY TOTAL   My clinical finding support this patients is temporarily home bound because she requires assistance of another person to leave the home requires considerable and taxing effort.  My clinical finding support the need fro PT because she has decrease mobility due to recent [address] [[id number]] (KNEE ARTHROPLASTY TOTAL        Francia Kothari NP  6/27/2019, 12:24 PM

## 2019-06-27 NOTE — ROUTINE PROCESS
Bedside and Verbal shift change report given to Coleman lpn (oncoming nurse) by jade mcgregor (offgoing nurse). Report included the following information Kardex, MAR and Recent Results.

## 2019-06-27 NOTE — PROGRESS NOTES
IDT team, , DON, MDS, Rehab Manager, Dietician, /,  met and reviewed patients medication, diet, therapy, nursing needs, discharge plans, and overall progress. Concerns identified and addressed to meet patients needs. Will continue to follow.

## 2019-06-27 NOTE — PROGRESS NOTES
Community Care Team Documentation for Patient in [facility]     Patient discharged from  [facility] to [facility], on 6/13/19. Hospital Discharge diagnosis:  Total Knee Right, Arthroplasty    SNF Attending Provider:  Dr. Angeles Palmoo    Anticipated discharge date from SNF:  TBD    PCP : CARRILLO Hebert    Nurse Navigator:     [facility] rounds completed, updates provided by facility. RRAT:  Low Risk            7       Total Score        3 Has Seen PCP in Last 6 Months (Yes=3, No=0)    4 Charlson Comorbidity Score (Age + Comorbid Conditions)        Criteria that do not apply:    . Living with Significant Other. Assisted Living. LTAC. SNF. or   Rehab    Patient Length of Stay (>5 days = 3)    IP Visits Last 12 Months (1-3=4, 4=9, >4=11)    Pt.  Coverage (Medicare=5 , Medicaid, or Self-Pay=4)          Active Ambulatory Problems     Diagnosis Date Noted    Obesity, morbid ([address] 11/14/2018    Absent kidney, acquired 03/28/2019    Essential hypertension 12/31/2016    Obstructive sleep apnea 01/05/2016    Tricompartment osteoarthritis of knees, bilateral 01/01/2016    Type 2 diabetes mellitus without complication ([address] 45/51/3560    Morbid obesity ([address] 01/05/2016    Osteoarthritis of knee 06/10/2019    Right knee DJD 06/10/2019    Acute blood loss as cause of postoperative anemia 06/13/2019     Resolved Ambulatory Problems     Diagnosis Date Noted    No Resolved Ambulatory Problems     Past Medical History:   Diagnosis Date    Arthritis     Diabetes ([address] 08/2016    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

## 2019-06-28 PROCEDURE — 74011250637 HC RX REV CODE- 250/637: Performed by: NURSE PRACTITIONER

## 2019-06-28 PROCEDURE — 74011250637 HC RX REV CODE- 250/637: Performed by: INTERNAL MEDICINE

## 2019-06-28 PROCEDURE — 74011250636 HC RX REV CODE- 250/636: Performed by: NURSE PRACTITIONER

## 2019-06-28 RX ADMIN — DOCUSATE SODIUM 100 MG: 100 CAPSULE, LIQUID FILLED ORAL at 08:23

## 2019-06-28 RX ADMIN — POLYETHYLENE GLYCOL 3350 17 G: 17 POWDER, FOR SOLUTION ORAL at 08:23

## 2019-06-28 RX ADMIN — HYDROCODONE BITARTRATE AND ACETAMINOPHEN 1 TABLET: 10; 325 TABLET ORAL at 08:26

## 2019-06-28 RX ADMIN — FERROUS SULFATE TAB 325 MG (65 MG ELEMENTAL FE) 325 MG: 325 (65 FE) TAB at 08:23

## 2019-06-28 RX ADMIN — FERROUS SULFATE TAB 325 MG (65 MG ELEMENTAL FE) 325 MG: 325 (65 FE) TAB at 18:02

## 2019-06-28 RX ADMIN — HYDROCODONE BITARTRATE AND ACETAMINOPHEN 2 TABLET: 10; 325 TABLET ORAL at 21:13

## 2019-06-28 RX ADMIN — ENOXAPARIN SODIUM 30 MG: 30 INJECTION, SOLUTION INTRAVENOUS; SUBCUTANEOUS at 00:00

## 2019-06-28 RX ADMIN — PANTOPRAZOLE SODIUM 20 MG: 20 TABLET, DELAYED RELEASE ORAL at 07:30

## 2019-06-28 RX ADMIN — Medication 500 MG: at 08:23

## 2019-06-28 RX ADMIN — LOSARTAN POTASSIUM 50 MG: 50 TABLET, FILM COATED ORAL at 08:23

## 2019-06-28 RX ADMIN — ENOXAPARIN SODIUM 30 MG: 30 INJECTION, SOLUTION INTRAVENOUS; SUBCUTANEOUS at 12:40

## 2019-06-28 RX ADMIN — Medication 1 TABLET: at 08:23

## 2019-06-28 RX ADMIN — HYDROCHLOROTHIAZIDE 12.5 MG: 25 TABLET ORAL at 09:00

## 2019-06-28 RX ADMIN — LATANOPROST 1 DROP: 50 SOLUTION/ DROPS OPHTHALMIC at 18:02

## 2019-06-28 RX ADMIN — VITAMIN D, TAB 1000IU (100/BT) 1000 UNITS: 25 TAB at 08:23

## 2019-06-28 NOTE — PROGRESS NOTES
conducted an initial consultation and Spiritual Assessment for Jose David Batista, who is a 68 y.o.,female. Patients Primary Language is: [place]. According to the patients EMR Anabaptism Affiliation is: Ginny Ingram. The reason the Patient came to the hospital is:   Patient Active Problem List    Diagnosis Date Noted    Acute blood loss as cause of postoperative anemia 06/13/2019    Osteoarthritis of knee 06/10/2019    Right knee DJD 06/10/2019    Absent kidney, acquired 03/28/2019    Obesity, morbid ([address] 11/14/2018    Essential hypertension 12/31/2016    Type 2 diabetes mellitus without complication ([facility] 35/35/7206    Obstructive sleep apnea 01/05/2016    Morbid obesity ([facility] 01/05/2016    Tricompartment osteoarthritis of knees, bilateral 01/01/2016        The  provided the following Interventions:  Initiated a relationship of care and support. Explored issues of srinivas, spirituality and/or Adventism needs while hospitalized. Listened empathically. Provided chaplaincy education. Provided information about Spiritual Care Services. Offered prayer and assurance of continued prayers on patient's behalf. Chart reviewed. The following outcomes were achieved:  Patient shared some information about their medical narrative and spiritual journey/beliefs. Patient processed feeling about current hospitalization. Patient expressed gratitude for the 's visit. Assessment:  Patient did not indicate any spiritual or Adventism issues which require Spiritual Care Services interventions at this time. Patient does not have any Adventism/cultural needs that will affect patients preferences in health care. Plan:  Chaplains will continue to follow and will provide pastoral care on an as needed or requested basis.  recommends bedside caregivers page  on duty if patient shows signs of acute spiritual or emotional distress.     [facility]   Staff [facility]   [phone]

## 2019-06-28 NOTE — ROUTINE PROCESS
Bedside and Verbal shift change report given to Ad Babin RN (oncoming nurse) by Usha Perales LPN (offgoing nurse). Report included the following information SBAR, Kardex and MAR.

## 2019-06-28 NOTE — PROGRESS NOTES
!4 day COT completed for KONG 6/27/19; required extensive to limited assitance with ADLS per obervational period of 6/21-6/27/19; per MDS Coordnator's observation.

## 2019-06-28 NOTE — TELEPHONE ENCOUNTER
Group Topic:  Monitoring Safety and Relapse    Date: June 28  Start Time:  9:00 AM  End Time: 12:00 PM    Number in attendance: 8    Safety Concerns: None  Types of Safety Concerns: Pt reports no safety concerns.  Physical Concerns: Pt reports no physical concerns.  Use of Street Drugs: No  Taking Medication as Prescribed?: Yes    Licensed Provider Directing Treatment: QUINTON Rush    Documentation Completed By (Under Supervision of Licensed Provider): GONZALES Pfeiffer Intern    I was present and agree with the content of the note.     QUINTON Rush   Spoke to daughter and she has canceled appt for now and will see pcp for clearance to start program first labs faxed to pcp office

## 2019-06-28 NOTE — ROUTINE PROCESS
Bedside and Verbal shift change report given to Sofi Castrejon (oncoming nurse) by Jola Oppenheim, RN (offgoing nurse). Report included the following information SBAR, Kardex and Recent Results.

## 2019-06-29 VITALS
SYSTOLIC BLOOD PRESSURE: 112 MMHG | BODY MASS INDEX: 42.72 KG/M2 | HEART RATE: 69 BPM | RESPIRATION RATE: 19 BRPM | WEIGHT: 197.4 LBS | TEMPERATURE: 96.7 F | OXYGEN SATURATION: 99 % | DIASTOLIC BLOOD PRESSURE: 73 MMHG

## 2019-06-29 PROCEDURE — 74011250637 HC RX REV CODE- 250/637: Performed by: NURSE PRACTITIONER

## 2019-06-29 PROCEDURE — 74011250636 HC RX REV CODE- 250/636: Performed by: NURSE PRACTITIONER

## 2019-06-29 PROCEDURE — 74011250637 HC RX REV CODE- 250/637: Performed by: INTERNAL MEDICINE

## 2019-06-29 RX ADMIN — DOCUSATE SODIUM 100 MG: 100 CAPSULE, LIQUID FILLED ORAL at 08:33

## 2019-06-29 RX ADMIN — LOSARTAN POTASSIUM 50 MG: 50 TABLET, FILM COATED ORAL at 08:33

## 2019-06-29 RX ADMIN — POLYETHYLENE GLYCOL 3350 17 G: 17 POWDER, FOR SOLUTION ORAL at 08:30

## 2019-06-29 RX ADMIN — PANTOPRAZOLE SODIUM 20 MG: 20 TABLET, DELAYED RELEASE ORAL at 08:33

## 2019-06-29 RX ADMIN — FERROUS SULFATE TAB 325 MG (65 MG ELEMENTAL FE) 325 MG: 325 (65 FE) TAB at 08:32

## 2019-06-29 RX ADMIN — VITAMIN D, TAB 1000IU (100/BT) 1000 UNITS: 25 TAB at 08:32

## 2019-06-29 RX ADMIN — HYDROCHLOROTHIAZIDE: 25 TABLET ORAL at 08:31

## 2019-06-29 RX ADMIN — Medication 500 MG: at 08:32

## 2019-06-29 RX ADMIN — HYDROCODONE BITARTRATE AND ACETAMINOPHEN 2 TABLET: 10; 325 TABLET ORAL at 08:52

## 2019-06-29 RX ADMIN — Medication 1 TABLET: at 08:31

## 2019-06-29 RX ADMIN — ENOXAPARIN SODIUM 30 MG: 30 INJECTION, SOLUTION INTRAVENOUS; SUBCUTANEOUS at 00:00

## 2019-06-29 NOTE — ROUTINE PROCESS
0500 patient resting quietly in bed, asked if she in pain, said yes but refused to have her prn medication at this time, instead requested her pain meds to be given at 8am.     Verbal shift change report given to MILADYS Triplett (oncoming nurse) by Carie Johnson (offgoing nurse).  Report included the following information SBAR, Intake/Output and MAR

## 2019-06-29 NOTE — ROUTINE PROCESS
Bedside and Verbal shift change report given to Elías Treviño RN (oncoming nurse) by ADILENE De La Garza RN (offgoing nurse). Report included the following information SBAR, Kardex and MAR.

## 2019-06-29 NOTE — PROGRESS NOTES
Patient discharged home via wheelchair in care of daughter. Patient education about medications, fall prevention, and TKR rehab provided. Patient and family member have no further questions at this time. Patient stated that she has all personal items. Arm band removed and shredded.

## 2019-07-19 ENCOUNTER — HOSPITAL ENCOUNTER (OUTPATIENT)
Dept: PHYSICAL THERAPY | Age: 74
Discharge: HOME | End: 2019-07-19
Payer: MEDICARE

## 2019-07-19 PROCEDURE — 97162 PT EVAL MOD COMPLEX 30 MIN: CPT

## 2019-07-19 PROCEDURE — 97140 MANUAL THERAPY 1/> REGIONS: CPT

## 2019-07-19 PROCEDURE — 97110 THERAPEUTIC EXERCISES: CPT

## 2019-07-19 NOTE — PROGRESS NOTES
PT DAILY TREATMENT NOTE 10-18    Patient Name: Indu Dimas  Date:2019  : 1945  [x]  Patient  Verified  Payor: [place] MEDICARE / Plan: [place] MEDICARE PART A & B / Product Type: Medicare /    In time:7:35  Out time:8:20  Total Treatment Time (min): 45  Visit #: 1 of 12    Medicare/BCBS Only   Total Timed Codes (min):  25 1:1 Treatment Time:  25     Treatment Area: Pain in right knee [M25.561]    SUBJECTIVE  Pain Level (0-10 scale): 5/10  Any medication changes, allergies to medications, adverse drug reactions, diagnosis change, or new procedure performed?: [x] No    [] Yes (see summary sheet for update)  Subjective functional status/changes:   [] No changes reported  The patient states that she has a chief complaint of stiffness of her knee. OBJECTIVE  20 min [x]Eval                  []Re-Eval       15 min Therapeutic Exercise:  [x] See flow sheet :   Rationale: increase ROM and increase strength to improve the patients ability to improve ADL ease. 10 min Manual Therapy: Right knee PROM, flexion/extension mobs. Rationale: decrease pain, increase ROM and increase tissue extensibility to improve ADL ease. With   [] TE   [] TA   [] neuro   [] other: Patient Education: [x] Review HEP    [] Progressed/Changed HEP based on:   [] positioning   [] body mechanics   [] transfers   [] heat/ice application    [] other:      Other Objective/Functional Measures: See IE     Pain Level (0-10 scale) post treatment: /10    ASSESSMENT/Changes in Function: See POC. Patient will continue to benefit from skilled PT services to modify and progress therapeutic interventions, address functional mobility deficits, address ROM deficits, address strength deficits, analyze and address soft tissue restrictions, analyze and cue movement patterns, analyze and modify body mechanics/ergonomics, assess and modify postural abnormalities and instruct in home and community integration to attain remaining goals. [x]  See Plan of Care  []  See progress note/recertification  []  See Discharge Summary         Progress towards goals / Updated goals:  Short Term Goals: To be accomplished in 2 weeks:              1. The patient will be independent and compliant with HEP to maximize therapeutic benefit. 2. The patient will improve knee extension to lacking 10 degrees to improve ease of ambulation. Long Term Goals: To be accomplished in 4 weeks:              1. The patient will improve FOTO score to predicted value to improve ease of ADLs. 2. The patient will improve knee flexion to 105 degrees to improve ease of transfers. 3. The patient will improve quad strength to 5/5 MMT to maximize stability in stance. 4. The patient will demonstrate reciprocal negotiation of stairs to maximize ease of stair negotiation.      PLAN  []  Upgrade activities as tolerated     [x]  Continue plan of care  []  Update interventions per flow sheet       []  Discharge due to:_  []  Other:_      Rey Rodríguez, PT 7/19/2019  8:47 AM    Future Appointments   Date Time Provider Shubham Wayne   7/23/2019  8:00 AM Angel Shabazz, PTA [facility]   7/24/2019  8:00 AM Angel Shabazz, PTA [facility]   7/26/2019  8:00 AM Toy Alvarado, PTA [facility]   7/29/2019  7:30 AM Nickie Regulus, PTA [facility]   7/31/2019  7:30 AM Patsi Lynne, PT [facility]   8/2/2019  7:30 AM Patsi Lynne, PT [facility]   8/5/2019  7:30 AM Nickie Regulus, PTA [facility]   8/7/2019  7:00 AM Angel Shabazz, PTA [facility]   8/9/2019  7:30 AM Patsi Lynne, PT [facility]   8/12/2019  7:30 AM Nickie Regulus, PTA [facility]   8/14/2019  7:30 AM Angel Shabazz, PTA [facility]

## 2019-07-23 ENCOUNTER — HOSPITAL ENCOUNTER (OUTPATIENT)
Dept: PHYSICAL THERAPY | Age: 74
Discharge: HOME | End: 2019-07-23
Payer: MEDICARE

## 2019-07-23 PROCEDURE — 97140 MANUAL THERAPY 1/> REGIONS: CPT

## 2019-07-23 PROCEDURE — 97016 VASOPNEUMATIC DEVICE THERAPY: CPT

## 2019-07-23 PROCEDURE — 97110 THERAPEUTIC EXERCISES: CPT

## 2019-07-23 NOTE — PROGRESS NOTES
PT DAILY TREATMENT NOTE 10-18    Patient Name: Soo Mccord  Date:2019  : 1945  [x]  Patient  Verified  Payor: [employer] / Plan: [place] MED[place] & B / Product Type: Medicare /    In time:7:57  Out time:9:01  Total Treatment Time (min): 59  Visit #: 2 of 12    Medicare/BCBS Only   Total Timed Codes (min):  54 1:1 Treatment Time:  44       Treatment Area: Pain in right knee [M25.561]    SUBJECTIVE  Pain Level (0-10 scale): 5  Any medication changes, allergies to medications, adverse drug reactions, diagnosis change, or new procedure performed?: [x] No    [] Yes (see summary sheet for update)  Subjective functional status/changes:   [] No changes reported  Pt reports she was doing her exercises at home and she feels as though it increased her pain. Pt stated once she noticed her pain increasing she stopped the exercises. OBJECTIVE    Modality rationale: decrease inflammation and decrease pain to improve the patients ability to perform ADL's with ease.    Min Type Additional Details    [] Estim:  []Unatt       []IFC  []Premod                        []Other:  []w/ice   []w/heat  Position:  Location:    [] Estim: []Att    []TENS instruct  []NMES                    []Other:  []w/US   []w/ice   []w/heat  Position:  Location:    []  Traction: [] Cervical       []Lumbar                       [] Prone          []Supine                       []Intermittent   []Continuous Lbs:  [] before manual  [] after manual    []  Ultrasound: []Continuous   [] Pulsed                           []1MHz   []3MHz W/cm2:  Location:    []  Iontophoresis with dexamethasone         Location: [] Take home patch   [] In clinic    []  Ice     []  heat  []  Ice massage  []  Laser   []  Anodyne Position:  Location:    []  Laser with stim  []  Other:  Position:  Location:   10 [x]  Vasopneumatic Device Pressure:       [x] lo [] med [] hi   Temperature: [x] lo [] med [] hi   [x] Skin assessment post-treatment:  [x]intact []redness- no adverse reaction    []redness - adverse reaction:     45 min Therapeutic Exercise:  [x] See flow sheet :   Rationale: increase ROM, increase strength and improve coordination to improve the patients ability to ambulate around the community without pain. 9 min Manual Therapy:  STM to Right knee and quad, PROM right knee, flexion and extension mobs    Rationale: decrease pain, increase ROM and increase tissue extensibility to improve functional mobility. With   [] TE   [] TA   [] neuro   [] other: Patient Education: [x] Review HEP    [] Progressed/Changed HEP based on:   [] positioning   [] body mechanics   [] transfers   [] heat/ice application    [] other:      Other Objective/Functional Measures:      Pain Level (0-10 scale) post treatment: 5    ASSESSMENT/Changes in Function:  Pt requires UE support during standing exercises due to LE and L/S weakness. Right knee warm to the touch with significant swelling limiting ROM. Pt notes pain in the medial right thigh that is TTP. Improved ROM by the end of Manual.     Patient will continue to benefit from skilled PT services to modify and progress therapeutic interventions, address functional mobility deficits, address ROM deficits, address strength deficits, analyze and address soft tissue restrictions, analyze and cue movement patterns, analyze and modify body mechanics/ergonomics and assess and modify postural abnormalities to attain remaining goals. [x]  See Plan of Care  []  See progress note/recertification  []  See Discharge Summary         Progress towards goals / Updated goals:  Short Term Goals: To be accomplished in 2 weeks:              1. The patient will be independent and compliant with HEP to maximize therapeutic benefit. Partial progression 7/23/19- pt reports initiating HEP but it caused pain so she stopped.              2. The patient will improve knee extension to lacking 10 degrees to improve ease of ambulation.   Long Term Goals: To be accomplished in 4 weeks:              1. The patient will improve FOTO score to predicted value to improve ease of ADLs.  2. The patient will improve knee flexion to 105 degrees to improve ease of transfers.              3. The patient will improve quad strength to 5/5 MMT to maximize stability in stance.              4.  The patient will demonstrate reciprocal negotiation of stairs to maximize ease of stair negotiation.        PLAN  []  Upgrade activities as tolerated     [x]  Continue plan of care  []  Update interventions per flow sheet       []  Discharge due to:_  []  Other:_      Alex Caldera, JAMIE 7/23/2019  7:31 AM    Future Appointments   Date Time Provider Shubham Wayne   7/23/2019  8:00 AM Ezzard Bickers, PTA [facility]   7/24/2019  8:00 AM Ezzard Bickers, PTA [facility]   7/26/2019  8:00 AM Curtis Lange, PTA [facility]   7/29/2019  7:30 AM Vern Garcia, PTA [facility]   7/31/2019  7:30 AM Armandonathanael Ace, PT [facility]   8/2/2019  7:30 AM Armandosaroj Ace, PT [facility]   8/5/2019  7:30 AM Vern Pat, PTA [facility]   8/7/2019  7:00 AM Ezzard Bickers, PTA [facility]   8/9/2019  7:30 AM Armandosaroj Ace, PT [facility]   8/12/2019  7:30 AM Vern Pat, PTA [facility]   8/14/2019  7:30 AM Ezzard Bickers, PTA [facility]

## 2019-07-24 ENCOUNTER — HOSPITAL ENCOUNTER (OUTPATIENT)
Dept: PHYSICAL THERAPY | Age: 74
Discharge: HOME | End: 2019-07-24
Payer: MEDICARE

## 2019-07-24 PROCEDURE — 97140 MANUAL THERAPY 1/> REGIONS: CPT

## 2019-07-24 PROCEDURE — 97016 VASOPNEUMATIC DEVICE THERAPY: CPT

## 2019-07-24 PROCEDURE — 97110 THERAPEUTIC EXERCISES: CPT

## 2019-07-24 NOTE — PROGRESS NOTES
PT DAILY TREATMENT NOTE 10-18    Patient Name: Arianne Fernandez  Date:2019  : 1945  [x]  Patient  Verified  Payor: [place] MEDICARE / Plan: [place] MEDICARE PART A & B / Product Type: Medicare /    In time:8:00  Out time:9:00  Total Treatment Time (min): 60  Visit #: 3 of 12    Medicare/BCBS Only   Total Timed Codes (min):  50 1:1 Treatment Time:  42       Treatment Area: Pain in right knee [M25.561]    SUBJECTIVE  Pain Level (0-10 scale): 7  Any medication changes, allergies to medications, adverse drug reactions, diagnosis change, or new procedure performed?: [x] No    [] Yes (see summary sheet for update)  Subjective functional status/changes:   [] No changes reported  Pt reports a lot of pain today. OBJECTIVE    Modality rationale: decrease edema, decrease inflammation and decrease pain to improve the patients ability to perform ADL's with ease.    Min Type Additional Details    [] Estim:  []Unatt       []IFC  []Premod                        []Other:  []w/ice   []w/heat  Position:  Location:    [] Estim: []Att    []TENS instruct  []NMES                    []Other:  []w/US   []w/ice   []w/heat  Position:  Location:    []  Traction: [] Cervical       []Lumbar                       [] Prone          []Supine                       []Intermittent   []Continuous Lbs:  [] before manual  [] after manual    []  Ultrasound: []Continuous   [] Pulsed                           []1MHz   []3MHz W/cm2:  Location:    []  Iontophoresis with dexamethasone         Location: [] Take home patch   [] In clinic    []  Ice     []  heat  []  Ice massage  []  Laser   []  Anodyne Position:  Location:    []  Laser with stim  []  Other:  Position:  Location:   10 [x]  Vasopneumatic Device Pressure:       [x] lo [] med [] hi   Temperature: [x] lo [] med [] hi   [x] Skin assessment post-treatment:  [x]intact []redness- no adverse reaction    []redness - adverse reaction:       40 min Therapeutic Exercise:  [x] See flow sheet : Rationale: increase ROM and increase strength to improve the patients ability to perform daily activites without pain. 10 min Manual Therapy:  STM to right knee and quad. PROM to right knee   Rationale: decrease pain, increase ROM and increase tissue extensibility to improve functional mobility. With   [] TE   [] TA   [] neuro   [] other: Patient Education: [x] Review HEP    [] Progressed/Changed HEP based on:   [] positioning   [] body mechanics   [] transfers   [] heat/ice application    [] other:      Other Objective/Functional Measures:    Pt educated on the importance of elevating the right LE to help decrease swelling. Pain Level (0-10 scale) post treatment: 4-5    ASSESSMENT/Changes in Function:   Warmth in the right knee has decreased since last visit. Pt is hypersensitive to touch in the medial right thigh, right medial calf and the proximal portion of her scar. Decreased eccentric quad strength during SLR due to pain and weakness in the right LE. Pt notes a decrease in pain by end of treatment. Patient will continue to benefit from skilled PT services to modify and progress therapeutic interventions, address functional mobility deficits, address ROM deficits, address strength deficits, analyze and address soft tissue restrictions, analyze and cue movement patterns, analyze and modify body mechanics/ergonomics and assess and modify postural abnormalities to attain remaining goals. [x]  See Plan of Care  []  See progress note/recertification  []  See Discharge Summary         Progress towards goals / Updated goals:  Progress towards goals / Updated goals:  Short Term Goals: To be accomplished in 2 weeks:              5. The patient will be independent and compliant with HEP to maximize therapeutic benefit. Partial progression 7/23/19- pt reports initiating HEP but it caused pain so she stopped.              2.  The patient will improve knee extension to lacking 10 degrees to improve ease of ambulation. Long Term Goals: To be accomplished in 4 weeks:              1. The patient will improve FOTO score to predicted value to improve ease of ADLs.  2. The patient will improve knee flexion to 105 degrees to improve ease of transfers.              3. The patient will improve quad strength to 5/5 MMT to maximize stability in stance.              4.  The patient will demonstrate reciprocal negotiation of stairs to maximize ease of stair negotiation.     PLAN  []  Upgrade activities as tolerated     [x]  Continue plan of care  []  Update interventions per flow sheet       []  Discharge due to:_  []  Other:_      Sarita Dickey, JAMIE 7/24/2019  8:17 AM    Future Appointments   Date Time Provider Shubham Wayne   7/26/2019  8:00 AM [facility]   7/29/2019  7:30 AM Bharati Gaona, PTA [facility]   7/31/2019  7:30 AM Fleta Hopping, PT [facility]   8/2/2019  7:30 AM Fleta Hopping, PT [facility]   8/5/2019  7:30 AM Bharati Gaona, PTA [facility]   8/7/2019  7:00 AM Climmie Bomaryann, PTA [facility]   8/9/2019  7:30 AM Fleta Hopping, PT [facility]   8/12/2019  7:30 AM Bharati Gaona, PTA [facility]   8/14/2019  7:30 AM Climmie Bomaryann, PTA [facility]

## 2019-07-26 ENCOUNTER — HOSPITAL ENCOUNTER (OUTPATIENT)
Dept: PHYSICAL THERAPY | Age: 74
Discharge: HOME | End: 2019-07-26
Payer: MEDICARE

## 2019-07-26 PROCEDURE — 97110 THERAPEUTIC EXERCISES: CPT

## 2019-07-26 PROCEDURE — 97140 MANUAL THERAPY 1/> REGIONS: CPT

## 2019-07-26 PROCEDURE — 97016 VASOPNEUMATIC DEVICE THERAPY: CPT

## 2019-07-26 NOTE — PROGRESS NOTES
PT DAILY TREATMENT NOTE 10-18    Patient Name: Jeimy Blind  Date:2019  : 1945  [x]  Patient  Verified  Payor: Rakesh Cos / Plan: [place] MEDICARE PART A & B / Product Type: Medicare /    In time:7:55  Out time:8:49  Total Treatment Time (min): 47  Visit #: 4 of 12    Medicare/BCBS Only   Total Timed Codes (min):  44 1:1 Treatment Time:  44       Treatment Area: Pain in right knee [M25.561]    SUBJECTIVE  Pain Level (0-10 scale): 7  Any medication changes, allergies to medications, adverse drug reactions, diagnosis change, or new procedure performed?: [x] No    [] Yes (see summary sheet for update)  Subjective functional status/changes:   [] No changes reported  Pt reports having a lot of pain and soreness    OBJECTIVE    Modality rationale: decrease inflammation and decrease pain to improve the patients ability to perform daily tasks   Min Type Additional Details    [] Estim:  []Unatt       []IFC  []Premod                        []Other:  []w/ice   []w/heat  Position:  Location:    [] Estim: []Att    []TENS instruct  []NMES                    []Other:  []w/US   []w/ice   []w/heat  Position:  Location:    []  Traction: [] Cervical       []Lumbar                       [] Prone          []Supine                       []Intermittent   []Continuous Lbs:  [] before manual  [] after manual    []  Ultrasound: []Continuous   [] Pulsed                           []1MHz   []3MHz W/cm2:  Location:    []  Iontophoresis with dexamethasone         Location: [] Take home patch   [] In clinic    []  Ice     []  heat  []  Ice massage  []  Laser   []  Anodyne Position:  Location:    []  Laser with stim  []  Other:  Position:  Location:   10 [x]  Vasopneumatic Device Pressure:       [x] lo [] med [] hi   Temperature: [x] lo [] med [] hi   [] Skin assessment post-treatment:  []intact []redness- no adverse reaction    []redness - adverse reaction:       34 min Therapeutic Exercise:  [x] See flow sheet :   Rationale: increase ROM and increase strength to improve the patients ability to perform ADLs    10 min Manual Therapy:  Patella mobs, right knee PROM with jt mobs, manual knee flex stretching in seated   Rationale: decrease pain, increase ROM and increase tissue extensibility to improve functional mobility            With   [] TE   [] TA   [] neuro   [] other: Patient Education: [x] Review HEP    [] Progressed/Changed HEP based on:   [] positioning   [] body mechanics   [] transfers   [] heat/ice application    [] other:      Other Objective/Functional Measures:   Cont's to be limited with knee flex  Medial knee pain reported throughout     Pain Level (0-10 scale) post treatment: 0 stiffness    ASSESSMENT/Changes in Function: Pt cont's to be greatly challenged with right knee ROM, especially flex. Noted sensitivity to touch at medial knee, no abnormal warms or color changes. Reviewed knee flex stretches for HEP. Patient will continue to benefit from skilled PT services to modify and progress therapeutic interventions, address functional mobility deficits, address ROM deficits, address strength deficits, analyze and address soft tissue restrictions, analyze and cue movement patterns and analyze and modify body mechanics/ergonomics to attain remaining goals. []  See Plan of Care  []  See progress note/recertification  []  See Discharge Summary         Progress towards goals / Updated goals:  Progress towards goals / Updated goals:  Short Term Goals: To be accomplished in 2 weeks:              9. The patient will be independent and compliant with HEP to maximize therapeutic benefit. Partial progression 7/23/19- pt reports initiating HEP but it caused pain so she stopped.              2. The patient will improve knee extension to lacking 10 degrees to improve ease of ambulation. Long Term Goals: To be accomplished in 4 weeks:              1.  The patient will improve FOTO score to predicted value to improve ease of ADLs. 2. The patient will improve knee flexion to 105 degrees to improve ease of transfers.              3. The patient will improve quad strength to 5/5 MMT to maximize stability in stance.              4.  The patient will demonstrate reciprocal negotiation of stairs to maximize ease of stair negotiation.     PLAN  []  Upgrade activities as tolerated     [x]  Continue plan of care  []  Update interventions per flow sheet       []  Discharge due to:_  []  Other:_      Swapna Jon PTA 7/26/2019  7:56 AM    Future Appointments   Date Time Provider Shubham Wayne   7/26/2019  8:00 AM Inna Jon PTA [facility]   7/29/2019  7:30 AM Vaughn Stevens PTA [facility]   7/31/2019  7:30 AM Lakshmi Mcallister, PT [facility]   8/2/2019  7:30 AM Lakshmi Mcallisetr, PT [facility]   8/5/2019  7:30 AM Nikki Skaggs PTA [facility]   8/7/2019  7:00 AM Andre Sharma PTA [facility]   8/9/2019  7:30 AM Lakshmi Mcallister, PT [facility]   8/12/2019  7:30 AM Nikki Skaggs, PTA [facility]   8/14/2019  7:30 AM Andre Sharma, PTA [facility]

## 2019-07-29 ENCOUNTER — HOSPITAL ENCOUNTER (OUTPATIENT)
Dept: PHYSICAL THERAPY | Age: 74
Discharge: HOME | End: 2019-07-29
Payer: MEDICARE

## 2019-07-29 PROCEDURE — 97016 VASOPNEUMATIC DEVICE THERAPY: CPT

## 2019-07-29 PROCEDURE — 97140 MANUAL THERAPY 1/> REGIONS: CPT

## 2019-07-29 PROCEDURE — 97110 THERAPEUTIC EXERCISES: CPT

## 2019-07-29 NOTE — PROGRESS NOTES
PT DAILY TREATMENT NOTE 10-18    Patient Name: Arianne Fernandez  Date:2019  : 1945  [x]  Patient  Verified  Payor: [place] MEDICARE / Plan: [place] MEDICARE PART A & B / Product Type: Medicare /    In time:7:30  Out time:8:24  Total Treatment Time (min): 54  Visit #: 5 of 12    Medicare/BCBS Only   Total Timed Codes (min):  44 1:1 Treatment Time:  44       Treatment Area: Pain in right knee [M25.561]    SUBJECTIVE  Pain Level (0-10 scale): 6/10  Any medication changes, allergies to medications, adverse drug reactions, diagnosis change, or new procedure performed?: [x] No    [] Yes (see summary sheet for update)  Subjective functional status/changes:   [] No changes reported  \"I'm walking so it's about a 6/10. \"    OBJECTIVE    Modality rationale: decrease inflammation and decrease pain to improve the patients ability to perform ADL's.    Min Type Additional Details    [] Estim:  []Unatt       []IFC  []Premod                        []Other:  []w/ice   []w/heat  Position:  Location:    [] Estim: []Att    []TENS instruct  []NMES                    []Other:  []w/US   []w/ice   []w/heat  Position:  Location:    []  Traction: [] Cervical       []Lumbar                       [] Prone          []Supine                       []Intermittent   []Continuous Lbs:  [] before manual  [] after manual    []  Ultrasound: []Continuous   [] Pulsed                           []1MHz   []3MHz W/cm2:  Location:    []  Iontophoresis with dexamethasone         Location: [] Take home patch   [] In clinic    []  Ice     []  heat  []  Ice massage  []  Laser   []  Anodyne Position:  Location:    []  Laser with stim  []  Other:  Position:  Location:   10 [x]  Vasopneumatic Device Pressure:       [x] lo [] med [] hi   Temperature: [] lo [] med [x] hi   [] Skin assessment post-treatment:  []intact []redness- no adverse reaction    []redness - adverse reaction:     36 min Therapeutic Exercise:  [x] See flow sheet :   Rationale: increase ROM, increase strength and increase proprioception to improve the patients ability to perform ADL's.    8 min Manual Therapy:  Right patellar mobs, knee PROM. Rationale: decrease pain, increase ROM and increase tissue extensibility to improve heel/toe gait pattern. With   [x] TE   [] TA   [] neuro   [] other: Patient Education: [x] Review HEP    [] Progressed/Changed HEP based on:   [] positioning   [] body mechanics   [] transfers   [] heat/ice application    [] other:      Other Objective/Functional Measures: Increased several exercises to 15 reps each. AROM Right knee extension lacking 13 degrees. Pain Level (0-10 scale) post treatment: 4/10    ASSESSMENT/Changes in Function: Improved knee extension ROM since SOC. Pt left in no apparent distress. Continue PT to further increase ROM/strength to improve ease of negotiating community distances and performing functional activities. Patient will continue to benefit from skilled PT services to modify and progress therapeutic interventions, address functional mobility deficits, address ROM deficits, address strength deficits, analyze and address soft tissue restrictions and analyze and modify body mechanics/ergonomics to attain remaining goals. [x]  See Plan of Care  []  See progress note/recertification  []  See Discharge Summary         Progress towards goals / Updated goals:  Short Term Goals: To be accomplished in 2 weeks:              3. The patient will be independent and compliant with HEP to maximize therapeutic benefit. Partial progression 7/23/19- pt reports initiating HEP but it caused pain so she stopped.              2. The patient will improve knee extension to lacking 10 degrees to improve ease of ambulation. - AROM Right knee extension lacking 13 degrees. 7/29/2019  Long Term Goals: To be accomplished in 4 weeks:              1. The patient will improve FOTO score to predicted value to improve ease of ADLs.  2.  The patient will improve knee flexion to 105 degrees to improve ease of transfers.              3. The patient will improve quad strength to 5/5 MMT to maximize stability in stance.              4.  The patient will demonstrate reciprocal negotiation of stairs to maximize ease of stair negotiation.     PLAN  []  Upgrade activities as tolerated     [x]  Continue plan of care  []  Update interventions per flow sheet       []  Discharge due to:_  []  Other:_      Cher Mead, JAMIE 7/29/2019  7:27 AM    Future Appointments   Date Time Provider Shubham Wayne   7/29/2019  7:30 AM Adrian Triana, JAMIE [facility]   7/31/2019  7:30 AM Compton, Margaree Cooks, PT [facility]   8/2/2019  7:30 AM Jakcie Giraldo, PT [facility]   8/5/2019  7:30 AM Madyson Pierre, PTA [facility]   8/7/2019  7:00 AM Gail Pedroza PTA [facility]   8/9/2019  7:30 AM Jackie Giraldo, PT [facility]   8/12/2019  7:30 AM Madyson Pierre PTA [facility]   8/14/2019  7:30 AM Gail Pedroza, PTA [facility]

## 2019-07-31 ENCOUNTER — HOSPITAL ENCOUNTER (OUTPATIENT)
Dept: PHYSICAL THERAPY | Age: 74
Discharge: HOME | End: 2019-07-31
Payer: MEDICARE

## 2019-07-31 PROCEDURE — 97140 MANUAL THERAPY 1/> REGIONS: CPT

## 2019-07-31 PROCEDURE — 97016 VASOPNEUMATIC DEVICE THERAPY: CPT

## 2019-07-31 PROCEDURE — 97110 THERAPEUTIC EXERCISES: CPT

## 2019-07-31 NOTE — PROGRESS NOTES
PT DAILY TREATMENT NOTE 10-18    Patient Name: Ulises Client  Date:2019  : 1945  [x]  Patient  Verified  Payor: [place] MEDICARE / Plan: [place] MEDICARE PART A & B / Product Type: Medicare /    In time:7:28  Out time:8:31  Total Treatment Time (min): 63  Visit #: 6 of 12    Medicare/BCBS Only   Total Timed Codes (min):  53 1:1 Treatment Time:  48       Treatment Area: Pain in right knee [M25.561]    SUBJECTIVE  Pain Level (0-10 scale): 5/10  Any medication changes, allergies to medications, adverse drug reactions, diagnosis change, or new procedure performed?: [x] No    [] Yes (see summary sheet for update)  Subjective functional status/changes:   [] No changes reported  The patient states that her knee is stiff and aching this morning, but no sig    OBJECTIVE  Modality rationale: decrease edema, decrease inflammation and decrease pain to improve the patients ability to improve ADL ease. Min Type Additional Details   10 [x]  Vasopneumatic Device Pressure:       [x] lo [] med [] hi   Temperature: [x] lo [] med [] hi   [] Skin assessment post-treatment:  []intact []redness- no adverse reaction    []redness - adverse reaction:     45 min Therapeutic Exercise:  [x] See flow sheet :   Rationale: increase ROM and increase strength to improve the patients ability to improve ADL ease. 8 min Manual Therapy:  PROM - right patellar mobs, knee mobs flexion/extension. Rationale: decrease pain, increase ROM and increase tissue extensibility to improve ADL ease. With   [] TE   [] TA   [] neuro   [] other: Patient Education: [x] Review HEP    [] Progressed/Changed HEP based on:   [] positioning   [] body mechanics   [] transfers   [] heat/ice application    [] other:      Other Objective/Functional Measures:   Right knee ROM: 7- 83    The patient continues to ambulate with SPC in her right hand, requiring cues for proper utilization.       Pain Level (0-10 scale) post treatment: 4/10    ASSESSMENT/Changes in Function: Progressing with knee ROM noting 7 degrees extension passively, and 83 degrees passive flexion. Patient will continue to benefit from skilled PT services to modify and progress therapeutic interventions, address functional mobility deficits, address ROM deficits, address strength deficits, analyze and address soft tissue restrictions, analyze and cue movement patterns, analyze and modify body mechanics/ergonomics, assess and modify postural abnormalities and instruct in home and community integration to attain remaining goals. []  See Plan of Care  []  See progress note/recertification  []  See Discharge Summary         Progress towards goals / Updated goals:  Short Term Goals: To be accomplished in 2 weeks:              1. The patient will be independent and compliant with HEP to maximize therapeutic benefit. Partial progression 7/23/19- pt reports initiating HEP but it caused pain so she stopped.              2. The patient will improve knee extension to lacking 10 degrees to improve ease of ambulation. - AROM Right knee extension lacking 13 degrees. 7/29/2019; Goal met - lacking 7 degrees 7/31/2019  Long Term Goals: To be accomplished in 4 weeks:              1. The patient will improve FOTO score to predicted value to improve ease of ADLs.  2. The patient will improve knee flexion to 105 degrees to improve ease of transfers. Improved to 83 degrees 7/31/2019              3. The patient will improve quad strength to 5/5 MMT to maximize stability in stance.              4.  The patient will demonstrate reciprocal negotiation of stairs to maximize ease of stair negotiation.     PLAN  []  Upgrade activities as tolerated     [x]  Continue plan of care  []  Update interventions per flow sheet       []  Discharge due to:_  []  Other:_      Gallo Ponce, PT 7/31/2019  7:24 AM    Future Appointments   Date Time Provider Shubham Wayne   7/31/2019  7:30 AM Umair Nicolás Couch, PT [facility]   8/2/2019  7:30 AM Nicolás Block, PT [facility]   8/5/2019  7:30 AM Nikki Skaggs, PTA [facility]   8/7/2019  7:00 AM Andre Sharma, PTA [facility]   8/9/2019  7:30 AM Lakshmi Mcallister, PT [facility]   8/12/2019  7:30 AM Nikki Skaggs, PTA [facility]   8/14/2019  7:30 AM Andre Sharma, PTA [facility]

## 2019-08-02 ENCOUNTER — HOSPITAL ENCOUNTER (OUTPATIENT)
Dept: PHYSICAL THERAPY | Age: 74
Discharge: HOME | End: 2019-08-02
Payer: MEDICARE

## 2019-08-02 PROCEDURE — 97140 MANUAL THERAPY 1/> REGIONS: CPT

## 2019-08-02 PROCEDURE — 97016 VASOPNEUMATIC DEVICE THERAPY: CPT

## 2019-08-02 PROCEDURE — 97110 THERAPEUTIC EXERCISES: CPT

## 2019-08-02 NOTE — PROGRESS NOTES
PT DAILY TREATMENT NOTE 10-18    Patient Name: Maribeth Larose  Date:2019  : 1945  [x]  Patient  Verified  Payor: VA MEDICARE / Plan: VA MEDICARE PART A & B / Product Type: Medicare /    In time:7:27  Out time:8:26  Total Treatment Time (min): 61  Visit #: 7 of 12     Medicare/BCBS Only   Total Timed Codes (min):  49 1:1 Treatment Time:  23     Treatment Area: Pain in right knee [M25.561]    SUBJECTIVE  Pain Level (0-10 scale): 3/10  Any medication changes, allergies to medications, adverse drug reactions, diagnosis change, or new procedure performed?: [x] No    [] Yes (see summary sheet for update)  Subjective functional status/changes:   [] No changes reported  The patient reports that she continues to be compliant with HEP, and that her pain is decreasing. OBJECTIVE  Modality rationale: decrease edema, decrease inflammation and decrease pain to improve the patients ability to improve ADL ease. Min Type Additional Details   10 [x]  Vasopneumatic Device Pressure:       [x] lo [] med [] hi   Temperature: [x] lo [] med [] hi   [] Skin assessment post-treatment:  []intact []redness- no adverse reaction    []redness - adverse reaction:     41 min Therapeutic Exercise:  [x] See flow sheet :   Rationale: increase ROM and increase strength to improve the patients ability to improve ADL ease. 8 min Manual Therapy:  Right tibiofemoral flexion and extension mobs, patellar mobs, scar massage in seated and extension mobs performed in supine. Rationale: decrease pain, increase ROM and increase tissue extensibility to improve ADL ease.          With   [] TE   [] TA   [] neuro   [] other: Patient Education: [x] Review HEP    [] Progressed/Changed HEP based on:   [] positioning   [] body mechanics   [] transfers   [] heat/ice application    [] other:      Other Objective/Functional Measures:     Pain Level (0-10 scale) post treatment: 2/10    ASSESSMENT/Changes in Function: Continues to exhibit limitation in flexion and extension of right knee. She the left the clinic in no apparent distress. Patient will continue to benefit from skilled PT services to modify and progress therapeutic interventions, address functional mobility deficits, address ROM deficits, address strength deficits, analyze and address soft tissue restrictions, analyze and cue movement patterns, analyze and modify body mechanics/ergonomics, assess and modify postural abnormalities and instruct in home and community integration to attain remaining goals. [x]  See Plan of Care  []  See progress note/recertification  []  See Discharge Summary         Progress towards goals / Updated goals:  Short Term Goals: To be accomplished in 2 weeks:              7. The patient will be independent and compliant with HEP to maximize therapeutic benefit. Partial progression 7/23/19- pt reports initiating HEP but it caused pain so she stopped.              2. The patient will improve knee extension to lacking 10 degrees to improve ease of ambulation. - AROM Right knee extension lacking 13 degrees. 7/29/2019; Goal met - lacking 7 degrees 7/31/2019  Long Term Goals: To be accomplished in 4 weeks:              1. The patient will improve FOTO score to predicted value to improve ease of ADLs.  2. The patient will improve knee flexion to 105 degrees to improve ease of transfers. Improved to 83 degrees 7/31/2019              3. The patient will improve quad strength to 5/5 MMT to maximize stability in stance.              4.  The patient will demonstrate reciprocal negotiation of stairs to maximize ease of stair negotiation.        PLAN  []  Upgrade activities as tolerated     [x]  Continue plan of care  []  Update interventions per flow sheet       []  Discharge due to:_  []  Other:_      Pedro Mahmood PT 8/2/2019  8:41 AM    Future Appointments   Date Time Provider Shubham Wayne   8/5/2019  7:30 AM Isabela Patel PTA [facility]   8/7/2019 7:00 AM Tran Monson, PTA [facility]   8/9/2019  7:30 AM Mónica Cheney, PT [facility]   8/12/2019  7:30 AM Dorene Awan, PTA [facility]   8/14/2019  7:30 AM Tran Monson, PTA [facility]

## 2019-08-05 ENCOUNTER — APPOINTMENT (OUTPATIENT)
Dept: PHYSICAL THERAPY | Age: 74
End: 2019-08-05
Payer: MEDICARE

## 2019-08-06 ENCOUNTER — HOSPITAL ENCOUNTER (OUTPATIENT)
Dept: PHYSICAL THERAPY | Age: 74
Discharge: HOME | End: 2019-08-06
Payer: MEDICARE

## 2019-08-06 PROCEDURE — 97140 MANUAL THERAPY 1/> REGIONS: CPT

## 2019-08-06 PROCEDURE — 97016 VASOPNEUMATIC DEVICE THERAPY: CPT

## 2019-08-06 PROCEDURE — 97110 THERAPEUTIC EXERCISES: CPT

## 2019-08-06 NOTE — PROGRESS NOTES
[address] [facility] [address] [facility] [id number] [phone]               Thank you for choosing us for your health care visit with Flor Castro DO.   We are glad to serve you and happy to provide you with this pool PT DAILY TREATMENT NOTE 10-18    Patient Name: Madhu Valentine  Date:2019  : 1945  [x]  Patient  Verified  Payor: VA MEDICARE / Plan: VA MEDICARE PART A & B / Product Type: Medicare /    In time:2:00  Out time:2:56  Total Treatment Time (min): 56  Visit #: 8 of 12    Medicare/BCBS Only   Total Timed Codes (min):  46 1:1 Treatment Time:  38       Treatment Area: Pain in right knee [M25.561]    SUBJECTIVE  Pain Level (0-10 scale): 4/10  Any medication changes, allergies to medications, adverse drug reactions, diagnosis change, or new procedure performed?: [x] No    [] Yes (see summary sheet for update)  Subjective functional status/changes:   [] No changes reported  Pt reports no new complaints of pain. Pt reports compliance with HEP. OBJECTIVE    Modality rationale: decrease inflammation and decrease pain to improve the patients ability to tolerate ADLs.     Min Type Additional Details    [] Estim:  []Unatt       []IFC  []Premod                        []Other:  []w/ice   []w/heat  Position:  Location:    [] Estim: []Att    []TENS instruct  []NMES                    []Other:  []w/US   []w/ice   []w/heat  Position:  Location:    []  Traction: [] Cervical       []Lumbar                       [] Prone          []Supine                       []Intermittent   []Continuous Lbs:  [] before manual  [] after manual    []  Ultrasound: []Continuous   [] Pulsed                           []1MHz   []3MHz W/cm2:  Location:    []  Iontophoresis with dexamethasone         Location: [] Take home patch   [] In clinic    []  Ice     []  heat  []  Ice massage  []  Laser   []  Anodyne Position:  Location:    []  Laser with stim  []  Other:  Position:  Location:   10 [x]  Vasopneumatic Device Pressure:       [x] lo [] med [] hi   Temperature: [x] lo [] med [] hi   [] Skin assessment post-treatment:  []intact []redness- no adverse reaction    []redness - adverse reaction:     38 min Therapeutic Exercise:  [x] See flow between 7:30am to 6pm and on Saturday between 8am and 1pm. Evening and weekend appointments for your exam are available. Walk-in patients are welcome for most exams.      [facility] and [address] [place] [address] Anabel sheet :   Rationale: increase ROM and increase strength to improve the patients ability to tolerate ADLs. 8 min Manual Therapy:  PROM to right knee, patellar mobs and scar massage   Rationale: decrease pain, increase ROM and increase tissue extensibility to tolerate ADLs. With   [] TE   [] TA   [] neuro   [] other: Patient Education: [x] Review HEP    [] Progressed/Changed HEP based on:   [] positioning   [] body mechanics   [] transfers   [] heat/ice application    [] other:      Other Objective/Functional Measures: significant scar adhesions present through incision site. Pain Level (0-10 scale) post treatment: 2/10    ASSESSMENT/Changes in Function: Pt has continued limitations through right knee A/PROM in both flexion and extension with continued slow progress toward goals. Patient will continue to benefit from skilled PT services to modify and progress therapeutic interventions, address functional mobility deficits, address ROM deficits, address strength deficits, analyze and address soft tissue restrictions and analyze and cue movement patterns to attain remaining goals. []  See Plan of Care  []  See progress note/recertification  []  See Discharge Summary         Progress towards goals / Updated goals:  Short Term Goals: To be accomplished in 2 weeks:              3. The patient will be independent and compliant with HEP to maximize therapeutic benefit. Partial progression 7/23/19- pt reports initiating HEP but it caused pain so she stopped.              2. The patient will improve knee extension to lacking 10 degrees to improve ease of ambulation. - AROM Right knee extension lacking 13 degrees. 7/29/2019; Goal met - lacking 7 degrees 7/31/2019  Long Term Goals: To be accomplished in 4 weeks:              1. The patient will improve FOTO score to predicted value to improve ease of ADLs.  2. The patient will improve knee flexion to 105 degrees to improve ease of transfers.  Improved as they start crawling and walking. As your children grow, continue to help them live a healthy active lifestyle.     To lead a healthy active life, families can strive to reach these goals:  o 5 servings of fruits and vegetables a day  o 4 servings of wate to 83 degrees 7/31/2019              3. The patient will improve quad strength to 5/5 MMT to maximize stability in stance.              4.  The patient will demonstrate reciprocal negotiation of stairs to maximize ease of stair negotiation.      PLAN  []  Upgrade activities as tolerated     [x]  Continue plan of care  []  Update interventions per flow sheet       []  Discharge due to:_  []  Other:_      Joe Sood, JAMIE 8/6/2019  2:09 PM    Future Appointments   Date Time Provider Shubham Wayne   8/7/2019 12:00 PM Tatiana Andrade [facility]   8/9/2019 12:00 PM Inna Jon [facility]   8/12/2019  7:30 AM Blaine Miller [facility]   8/14/2019  7:30 AM Alyse Carrero [facility]

## 2019-08-07 ENCOUNTER — HOSPITAL ENCOUNTER (OUTPATIENT)
Dept: PHYSICAL THERAPY | Age: 74
Discharge: HOME | End: 2019-08-07
Payer: MEDICARE

## 2019-08-07 PROCEDURE — 97140 MANUAL THERAPY 1/> REGIONS: CPT

## 2019-08-07 PROCEDURE — 97016 VASOPNEUMATIC DEVICE THERAPY: CPT

## 2019-08-07 PROCEDURE — 97110 THERAPEUTIC EXERCISES: CPT

## 2019-08-07 NOTE — PROGRESS NOTES
PT DAILY TREATMENT NOTE 10-18    Patient Name: Indu Dimas  Date:2019  : 1945  [x]  Patient  Verified  Payor: VA MEDICARE / Plan: VA MEDICARE PART A & B / Product Type: Medicare /    In time:11:57  Out time:12:46  Total Treatment Time (min): 49  Visit #: 9 of 12    Medicare/BCBS Only   Total Timed Codes (min):  39 1:1 Treatment Time:  39       Treatment Area: Pain in right knee [M25.561]    SUBJECTIVE  Pain Level (0-10 scale): 7/10  Any medication changes, allergies to medications, adverse drug reactions, diagnosis change, or new procedure performed?: [x] No    [] Yes (see summary sheet for update)  Subjective functional status/changes:   [] No changes reported  Pt reports increased pain since yesterday. OBJECTIVE    Modality rationale: decrease inflammation and decrease pain to improve the patients ability to tolerate ADLs.     Min Type Additional Details    [] Estim:  []Unatt       []IFC  []Premod                        []Other:  []w/ice   []w/heat  Position:  Location:    [] Estim: []Att    []TENS instruct  []NMES                    []Other:  []w/US   []w/ice   []w/heat  Position:  Location:    []  Traction: [] Cervical       []Lumbar                       [] Prone          []Supine                       []Intermittent   []Continuous Lbs:  [] before manual  [] after manual    []  Ultrasound: []Continuous   [] Pulsed                           []1MHz   []3MHz W/cm2:  Location:    []  Iontophoresis with dexamethasone         Location: [] Take home patch   [] In clinic    []  Ice     []  heat  []  Ice massage  []  Laser   []  Anodyne Position:  Location:    []  Laser with stim  []  Other:  Position:  Location:   10 [x]  Vasopneumatic Device Pressure:       [x] lo [] med [] hi   Temperature: [x] lo [x] med [] hi   [] Skin assessment post-treatment:  []intact []redness- no adverse reaction    []redness - adverse reaction:     21 min Therapeutic Exercise:  [x] See flow sheet :   Rationale: increase ROM and increase strength to improve the patients ability to tolerate ADLs. 10 min Neuromuscular Re-education:  [x]  See flow sheet :   Rationale: increase strength, improve coordination and increase proprioception  to improve the patients ability to perform functional activities with increased ease. 8 min Manual Therapy:  PROM to right knee   Rationale: decrease pain, increase ROM and increase tissue extensibility to improve functional mobility. With   [] TE   [] TA   [] neuro   [] other: Patient Education: [x] Review HEP    [] Progressed/Changed HEP based on:   [] positioning   [] body mechanics   [] transfers   [] heat/ice application    [] other:      Other Objective/Functional Measures: FOTO:53     Pain Level (0-10 scale) post treatment: 5/10    ASSESSMENT/Changes in Function: Pt has increased pain today, held exercises as per flow sheet. Pt has reports of decreased pain post treatment. Patient will continue to benefit from skilled PT services to modify and progress therapeutic interventions, address functional mobility deficits, address ROM deficits, address strength deficits, analyze and address soft tissue restrictions, analyze and cue movement patterns and analyze and modify body mechanics/ergonomics to attain remaining goals. []  See Plan of Care  []  See progress note/recertification  []  See Discharge Summary         Progress towards goals / Updated goals:  Short Term Goals: To be accomplished in 2 weeks:              7. The patient will be independent and compliant with HEP to maximize therapeutic benefit. Partial progression 7/23/19- pt reports initiating HEP but it caused pain so she stopped.              2. The patient will improve knee extension to lacking 10 degrees to improve ease of ambulation. - AROM Right knee extension lacking 13 degrees. 7/29/2019; Goal met - lacking 7 degrees 7/31/2019  Long Term Goals: To be accomplished in 4 weeks:              1.  The patient will improve FOTO score to predicted value to improve ease of ADLs.   2. The patient will improve knee flexion to 105 degrees to improve ease of transfers. Improved to 83 degrees 7/31/2019              3. The patient will improve quad strength to 5/5 MMT to maximize stability in stance.              4.  The patient will demonstrate reciprocal negotiation of stairs to maximize ease of stair negotiation.        PLAN  []  Upgrade activities as tolerated     [x]  Continue plan of care  []  Update interventions per flow sheet       []  Discharge due to:_  []  Other:_      Suzanne Casas, JAMIE 8/7/2019  12:04 PM    Future Appointments   Date Time Provider Shubham Wayne   8/9/2019 12:00 PM Willadean Ormond, PTA [facility]   8/12/2019  7:30 AM Elodia Norris PTA [facility]   8/14/2019  7:30 AM Iron Barber [facility]

## 2019-08-09 ENCOUNTER — HOSPITAL ENCOUNTER (OUTPATIENT)
Dept: PHYSICAL THERAPY | Age: 74
Discharge: HOME | End: 2019-08-09
Payer: MEDICARE

## 2019-08-09 PROCEDURE — 97140 MANUAL THERAPY 1/> REGIONS: CPT

## 2019-08-09 PROCEDURE — 97016 VASOPNEUMATIC DEVICE THERAPY: CPT

## 2019-08-09 PROCEDURE — 97110 THERAPEUTIC EXERCISES: CPT

## 2019-08-09 NOTE — PROGRESS NOTES
PT DAILY TREATMENT NOTE 10-18    Patient Name: Amanda Wolfe  Date:2019  : 1945  [x]  Patient  Verified  Payor: VA MEDICARE / Plan: VA MEDICARE PART A & B / Product Type: Medicare /    In time:12:05  Out time:12:53  Total Treatment Time (min): 48  Visit #: 10 of 12    Medicare/BCBS Only   Total Timed Codes (min):  38 1:1 Treatment Time:  38       Treatment Area: Pain in right knee [M25.561]    SUBJECTIVE  Pain Level (0-10 scale): 8  Any medication changes, allergies to medications, adverse drug reactions, diagnosis change, or new procedure performed?: [x] No    [] Yes (see summary sheet for update)  Subjective functional status/changes:   [] No changes reported  Pt reports swelling and pain are a little worse today    OBJECTIVE    Modality rationale: decrease inflammation and decrease pain to improve the patients ability to perform daily tasks   Min Type Additional Details    [] Estim:  []Unatt       []IFC  []Premod                        []Other:  []w/ice   []w/heat  Position:  Location:    [] Estim: []Att    []TENS instruct  []NMES                    []Other:  []w/US   []w/ice   []w/heat  Position:  Location:    []  Traction: [] Cervical       []Lumbar                       [] Prone          []Supine                       []Intermittent   []Continuous Lbs:  [] before manual  [] after manual    []  Ultrasound: []Continuous   [] Pulsed                           []1MHz   []3MHz W/cm2:  Location:    []  Iontophoresis with dexamethasone         Location: [] Take home patch   [] In clinic    []  Ice     []  heat  []  Ice massage  []  Laser   []  Anodyne Position:  Location:    []  Laser with stim  []  Other:  Position:  Location:   10 [x]  Vasopneumatic Device Pressure:       [x] lo [] med [] hi   Temperature: [x] lo [] med [] hi   [] Skin assessment post-treatment:  []intact []redness- no adverse reaction    []redness - adverse reaction:       30 min Therapeutic Exercise:  [x] See flow sheet : Rationale: increase ROM and increase strength to improve the patients ability to perform ADLs     8 min Manual Therapy:  Patella mobs, knee flex/ext jt mobs with manual stretching, star massage   Rationale: decrease pain, increase ROM and increase tissue extensibility to improve functional mobility            With   [] TE   [] TA   [] neuro   [] other: Patient Education: [x] Review HEP    [] Progressed/Changed HEP based on:   [] positioning   [] body mechanics   [] transfers   [] heat/ice application    [] other:      Other Objective/Functional Measures:   Right knee ROM 8-78*  MMT right quad 4/5      Pain Level (0-10 scale) post treatment: 2    ASSESSMENT/Changes in Function: Pt has made slow progress towards improved right knee ROM (22-67* at Eval). Demo's improved quad strength, cont's to req bilateral HR with stair negotiation and step-to pattern. Pt reports medial knee pain and swelling limits her ability to amb without SPC in home. Discussed cont'd PT to cont progressing right knee ROM and strength. Patient will continue to benefit from skilled PT services to modify and progress therapeutic interventions, address functional mobility deficits, address ROM deficits, address strength deficits, analyze and address soft tissue restrictions, analyze and cue movement patterns and analyze and modify body mechanics/ergonomics to attain remaining goals. []  See Plan of Care  []  See progress note/recertification  []  See Discharge Summary         Progress towards goals / Updated goals:  Short Term Goals: To be accomplished in 2 weeks:              3. The patient will be independent and compliant with HEP to maximize therapeutic benefit. Partial progression 7/23/19- pt reports initiating HEP but it caused pain so she stopped.              2. The patient will improve knee extension to lacking 10 degrees to improve ease of ambulation. - AROM Right knee extension lacking 13 degrees.  7/29/2019; Goal met - lacking 7 degrees 7/31/2019  Long Term Goals: To be accomplished in 4 weeks:              1. The patient will improve FOTO score to predicted value to improve ease of ADLs.  Progressing- FOTO 53 (goal 54) 8/7/19    2. The patient will improve knee flexion to 105 degrees to improve ease of transfers. Improved to 83 degrees 7/31/2019              3. The patient will improve quad strength to 5/5 MMT to maximize stability in stance. Progressing- MMT right quad 4/5 8/9/19                4. The patient will demonstrate reciprocal negotiation of stairs to maximize ease of stair negotiation.  Progressing- Pt negotiates staid with bilateral HR and step-to pattern 8/9/19    PLAN  []  Upgrade activities as tolerated     [x]  Continue plan of care  []  Update interventions per flow sheet       []  Discharge due to:_  []  Other:_      Melissa Jon PTA 8/9/2019  12:05 PM    Future Appointments   Date Time Provider Shubham Wayne   8/12/2019  7:30 AM [facility]   8/14/2019  7:30 AM [facility]

## 2019-08-09 NOTE — PROGRESS NOTES
[facility]  [address] [facility] [address]  [place], [address]  [phone] fax    Continued Plan of Care/ Re-certification for Physical Therapy Services    Patient name: Debra Laguerre Start of Care: 2019   Referral source: Theopolis Moritz, MD : 1945               Medical Diagnosis: Pain in right knee [M25.561]  Payor: VA MEDICARE / Plan: VA MEDICARE PART A & B / Product Type: Medicare /  [employer] has worsened in the last year               Treatment Diagnosis: right knee pain   Prior Hospitalization: see medical history Provider#: [id number]   Medications: Verified on Patient summary List    Comorbidities: Arthritis, Back pain, BMI over 30, DM, HTN, kidney/bladder/urination problems, Prior Surgery- kidney removed, shoulder surgery, hysterectomy, . Prior Level of Function: Patient has been utilizing a [facility] for the last 3 years, and is able to do light household management. Visits from Start of Care: 10   Missed Visits: 0    The Plan of Care and following information is based on the patient's current status:  Short Term Goals: To be accomplished in 2 weeks:              6. The patient will be independent and compliant with HEP to maximize therapeutic benefit. Partial progression 19- pt reports initiating HEP but it caused pain so she stopped.              2. The patient will improve knee extension to lacking 10 degrees to improve ease of ambulation. - AROM Right knee extension lacking 13 degrees. 2019; Goal met - lacking 7 degrees 2019  Long Term Goals: To be accomplished in 4 weeks:              1. The patient will improve FOTO score to predicted value to improve ease of ADLs.  Progressing- FOTO 53 (goal 54) 19   2. The patient will improve knee flexion to 105 degrees to improve ease of transfers. Improved to 83 degrees 2019              3.  The patient will improve quad strength to 5/5 MMT to maximize stability in stance. Progressing- MMT right quad 4/5 8/9/19              4. The patient will demonstrate reciprocal negotiation of stairs to maximize ease of stair negotiation. Progressing- Pt negotiates staid with bilateral HR and step-to pattern 8/9/19    Key functional changes:   Right knee ROM 8-78  MMT right quad 4/5    Problems/ barriers to goal attainment: None     Problem List: pain affecting function, decrease ROM, decrease strength, edema affecting function, impaired gait/ balance, decrease ADL/ functional abilitiies, decrease activity tolerance, decrease flexibility/ joint mobility and decrease transfer abilities    Treatment Plan: Therapeutic exercise, Therapeutic activities, Neuromuscular re-education, Physical agent/modality, Gait/balance training, Manual therapy, Patient education, Functional mobility training, Home safety training and Stair training     Patient Goal (s) has been updated and includes: Walk better     Goals for this certification period to be accomplished in  weeks:  1. The patient will improve FOTO score to predicted value to improve ease of ADLs.  Progressing- FGNS 79 (goal 54) 8/7/19     2. The patient will improve knee flexion to 105 degrees to improve ease of transfers. Improved to 83 degrees 7/31/2019   3. The patient will improve quad strength to 5/5 MMT to maximize stability in stance. Progressing- MMT right quad 4/5 8/9/19    4. The patient will demonstrate reciprocal negotiation of stairs to maximize ease of stair negotiation. Progressing- Pt negotiates stairs with bilateral HR and step-to pattern 8/9/19    Frequency / Duration: Patient to be seen 2-3 times per week for 4 weeks:    Assessment / Recommendations: Pt has made slow progress towards improved right knee ROM (22-67* at Eval). Demo's improved quad strength, cont's to req bilateral HR with stair negotiation and step-to pattern. Pt reports medial knee pain and swelling limits her ability to amb without SPC in home. Discussed cont'd PT to cont progressing right knee ROM and strength. Certification Period:  8/09/2019 - 10/07/2019    Annetta Walker, PT 8/9/2019 4:18 PM    ________________________________________________________________________  I certify that the above Therapy Services are being furnished while the patient is under my care. I agree with the treatment plan and certify that this therapy is necessary. [] I have read the above and request that my patient continue as recommended.   [] I have read the above report and request that my patient continue therapy with the following changes/special instructions: _____________________________________________  [] I have read the above report and request that my patient be discharged from therapy    Physician's Signature:____________Date:_________TIME:________    ** Signature, Date and Time must be completed for valid certification **    Please sign and return to [facility] [address] [place] [address] & [facility]  [address] [address] [address]  [phone] fax

## 2019-08-12 ENCOUNTER — HOSPITAL ENCOUNTER (OUTPATIENT)
Dept: PHYSICAL THERAPY | Age: 74
Discharge: HOME | End: 2019-08-12
Payer: MEDICARE

## 2019-08-12 PROCEDURE — 97016 VASOPNEUMATIC DEVICE THERAPY: CPT

## 2019-08-12 PROCEDURE — 97110 THERAPEUTIC EXERCISES: CPT

## 2019-08-12 PROCEDURE — 97140 MANUAL THERAPY 1/> REGIONS: CPT

## 2019-08-12 NOTE — PROGRESS NOTES
PT DAILY TREATMENT NOTE 10-18    Patient Name: Winferd Bernheim  Date:2019  : 1945  [x]  Patient  Verified  Payor: VA MEDICARE / Plan: VA MEDICARE PART A & B / Product Type: Medicare /    In time:730  Out time:821  Total Treatment Time (min): 51  Visit #: 11 of 12    Medicare/BCBS Only   Total Timed Codes (min):  51 1:1 Treatment Time:  51       Treatment Area: Pain in right knee [M25.561]    SUBJECTIVE  Pain Level (0-10 scale): 3  Any medication changes, allergies to medications, adverse drug reactions, diagnosis change, or new procedure performed?: [x] No    [] Yes (see summary sheet for update)  Subjective functional status/changes:   [] No changes reported  Patient reports stiffness in right knee    OBJECTIVE    Modality rationale:  To decrease pain and inflamation to improve the patients ability to perform ADLs   Min Type Additional Details    [] Estim:  []Unatt       []IFC  []Premod                        []Other:  []w/ice   []w/heat  Position:  Location:    [] Estim: []Att    []TENS instruct  []NMES                    []Other:  []w/US   []w/ice   []w/heat  Position:  Location:    []  Traction: [] Cervical       []Lumbar                       [] Prone          []Supine                       []Intermittent   []Continuous Lbs:  [] before manual  [] after manual    []  Ultrasound: []Continuous   [] Pulsed                           []1MHz   []3MHz W/cm2:  Location:    []  Iontophoresis with dexamethasone         Location: [] Take home patch   [] In clinic    []  Ice     []  heat  []  Ice massage  []  Laser   []  Anodyne Position:  Location:    []  Laser with stim  []  Other:  Position:  Location:    []  Vasopneumatic Device Pressure:       [] lo [] med [] hi   Temperature: [] lo [] med [] hi   [] Skin assessment post-treatment:  []intact []redness- no adverse reaction    []redness - adverse    33 min Therapeutic Exercise:  [] See flow sheet :   Rationale: increase ROM and increase strength to improve the patients ability to perform ADLs      8 min Manual Therapy:  Patellar mobs, knee flex/ext mobs, manual stretching and scar massage   Rationale: increase ROM and increase tissue extensibility to perform ADLs          With   [] TE   [] TA   [] neuro   [] other: Patient Education: [x] Review HEP    [] Progressed/Changed HEP based on:   [] positioning   [] body mechanics   [] transfers   [] heat/ice application    [] other:      Other Objective/Functional Measures: AROM 5-84     Pain Level (0-10 scale) post treatment: 3    ASSESSMENT/Changes in Function: patient tolerated progressed therex well without c/o increased pain post.    Patient will continue to benefit from skilled PT services to modify and progress therapeutic interventions, address functional mobility deficits, address ROM deficits, address strength deficits, analyze and address soft tissue restrictions and analyze and cue movement patterns to attain remaining goals. []  See Plan of Care  []  See progress note/recertification  []  See Discharge Summary         Progress towards goals / Updated goals:  Goals for this certification period to be accomplished in  weeks:  1. The patient will improve FOTO score to predicted value to improve ease of ADLs.  Progressing- HQLD 72 (goal 54) 8/7/19     2. The patient will improve knee flexion to 105 degrees to improve ease of transfers. Improved to 83 degrees 7/31/2019   3. The patient will improve quad strength to 5/5 MMT to maximize stability in stance. Progressing- MMT right quad 4/5 8/9/19    4. The patient will demonstrate reciprocal negotiation of stairs to maximize ease of stair negotiation.  Progressing- Pt negotiates stairs with bilateral HR and step-to pattern 8/9/19    PLAN  []  Upgrade activities as tolerated     [x]  Continue plan of care  []  Update interventions per flow sheet       []  Discharge due to:_  []  Other:_      Anshul Arreola PTA 8/12/2019  7:42 AM    Future Appointments Date Time Provider Shubham Rosana   8/14/2019  7:30 AM Melodie Dowling, PTA [facility]

## 2019-08-14 ENCOUNTER — HOSPITAL ENCOUNTER (OUTPATIENT)
Dept: PHYSICAL THERAPY | Age: 74
Discharge: HOME | End: 2019-08-14
Payer: MEDICARE

## 2019-08-14 PROCEDURE — 97530 THERAPEUTIC ACTIVITIES: CPT

## 2019-08-14 PROCEDURE — 97110 THERAPEUTIC EXERCISES: CPT

## 2019-08-14 PROCEDURE — 97140 MANUAL THERAPY 1/> REGIONS: CPT

## 2019-08-14 NOTE — PROGRESS NOTES
PT DAILY TREATMENT NOTE 10-18    Patient Name: Sabina Ross  Date:2019  : 1945  [x]  Patient  Verified  Payor: Sayra Esquivelnight / Plan: VA MEDICARE PART A & B / Product Type: Medicare /    In time:7:35  Out time: 8:39  Total Treatment Time (min): 59  Visit #: 12 of 12    Medicare/BCBS Only   Total Timed Codes (min):  54 1:1 Treatment Time:  47       Treatment Area: Pain in right knee [M25.561]    SUBJECTIVE  Pain Level (0-10 scale): 0  Any medication changes, allergies to medications, adverse drug reactions, diagnosis change, or new procedure performed?: [x] No    [] Yes (see summary sheet for update)  Subjective functional status/changes:   [] No changes reported  Pt reports she has a hard time finding a good position to sleep in because of pain. OBJECTIVE    Modality rationale: decrease edema, decrease inflammation and decrease pain to improve the patients ability to perform ADL's with ease.    Min Type Additional Details    [] Estim:  []Unatt       []IFC  []Premod                        []Other:  []w/ice   []w/heat  Position:  Location:    [] Estim: []Att    []TENS instruct  []NMES                    []Other:  []w/US   []w/ice   []w/heat  Position:  Location:    []  Traction: [] Cervical       []Lumbar                       [] Prone          []Supine                       []Intermittent   []Continuous Lbs:  [] before manual  [] after manual    []  Ultrasound: []Continuous   [] Pulsed                           []1MHz   []3MHz W/cm2:  Location:    []  Iontophoresis with dexamethasone         Location: [] Take home patch   [] In clinic    []  Ice     []  heat  []  Ice massage  []  Laser   []  Anodyne Position:  Location:    []  Laser with stim  []  Other:  Position:  Location:   10 [x]  Vasopneumatic Device Pressure:       [x] lo [] med [] hi   Temperature: [x] lo [] med [] hi   [x] Skin assessment post-treatment:  [x]intact []redness- no adverse reaction    []redness - adverse reaction:       29 min Therapeutic Exercise:  [x] See flow sheet :   Rationale: increase ROM and increase strength to improve the patients ability to perform ADL's with ease. 18 min Therapeutic Activity:  [x]  See flow sheet :   Rationale: increase ROM, increase strength, improve coordination and improve balance  to improve the patients ability to navigate the community with ease. 8 min Manual Therapy:   Patellar mobs, knee flex/ext mobs, manual stretching (flex/Ext) and scar massage   Rationale: decrease pain, increase ROM and increase tissue extensibility to improve functional mobility. With   [] TE   [] TA   [] neuro   [] other: Patient Education: [x] Review HEP    [] Progressed/Changed HEP based on:   [] positioning   [] body mechanics   [] transfers   [] heat/ice application    [] other:      Other Objective/Functional Measures:      Pain Level (0-10 scale) post treatment: 2    ASSESSMENT/Changes in Function:   Pt remains limited with flex due to pain and swelling in the post right knee. Pt challenged well with therex displaying fatigue with standing exercises. Continued PT to increase right knee ROM and strength. Patient will continue to benefit from skilled PT services to modify and progress therapeutic interventions, address functional mobility deficits, address ROM deficits, address strength deficits, analyze and address soft tissue restrictions, analyze and cue movement patterns, analyze and modify body mechanics/ergonomics and assess and modify postural abnormalities to attain remaining goals. [x]  See Plan of Care  []  See progress note/recertification  []  See Discharge Summary         Progress towards goals / Updated goals:  Goals for this certification period to be accomplished in  weeks:  1. The patient will improve FOTO score to predicted value to improve ease of ADLs.  Progressing- GQCY 47 (goal 54) 8/7/19     2.  The patient will improve knee flexion to 105 degrees to improve ease of transfers. Improved to 83 degrees 7/31/2019, progressing 8/14/19- 86 degrees   3. The patient will improve quad strength to 5/5 MMT to maximize stability in stance. Progressing- MMT right quad 4/5 8/9/19    4. The patient will demonstrate reciprocal negotiation of stairs to maximize ease of stair negotiation.  Progressing- Pt negotiates stairs with bilateral HR and step-to pattern 8/9/19       PLAN  []  Upgrade activities as tolerated     [x]  Continue plan of care  []  Update interventions per flow sheet       []  Discharge due to:_  []  Other:_      Haskell Paget, PTA 8/14/2019  6:47 AM    Future Appointments   Date Time Provider Shubham Wayne   8/14/2019  7:30 AM Alyse Carrero PTA [facility]

## 2019-08-19 ENCOUNTER — HOSPITAL ENCOUNTER (OUTPATIENT)
Dept: PHYSICAL THERAPY | Age: 74
Discharge: HOME | End: 2019-08-19
Payer: MEDICARE

## 2019-08-19 PROCEDURE — 97140 MANUAL THERAPY 1/> REGIONS: CPT

## 2019-08-19 PROCEDURE — 97110 THERAPEUTIC EXERCISES: CPT

## 2019-08-19 NOTE — PROGRESS NOTES
PT DAILY TREATMENT NOTE 10-18    Patient Name: Amanda Wolfe  Date:2019  : 1945  [x]  Patient  Verified  Payor: VA MEDICARE / Plan: VA MEDICARE PART A & B / Product Type: Medicare /    In time:7:34  Out time:8:28  Total Treatment Time (min): 47  Visit #: 4 of     Medicare/BCBS Only   Total Timed Codes (min):  44 1:1 Treatment Time:  26       Treatment Area: Pain in right knee [M25.561]    SUBJECTIVE  Pain Level (0-10 scale): 4  Any medication changes, allergies to medications, adverse drug reactions, diagnosis change, or new procedure performed?: [x] No    [] Yes (see summary sheet for update)  Subjective functional status/changes:   [] No changes reported  Pt reports seeing MD and was told she needs to bend her knee more    OBJECTIVE    Modality rationale: decrease pain to improve the patients ability to perform daily tasks   Min Type Additional Details    [] Estim:  []Unatt       []IFC  []Premod                        []Other:  []w/ice   []w/heat  Position:  Location:    [] Estim: []Att    []TENS instruct  []NMES                    []Other:  []w/US   []w/ice   []w/heat  Position:  Location:    []  Traction: [] Cervical       []Lumbar                       [] Prone          []Supine                       []Intermittent   []Continuous Lbs:  [] before manual  [] after manual    []  Ultrasound: []Continuous   [] Pulsed                           []1MHz   []3MHz W/cm2:  Location:    []  Iontophoresis with dexamethasone         Location: [] Take home patch   [] In clinic   10 [x]  Ice     []  heat  []  Ice massage  []  Laser   []  Anodyne Position: seated  Location: left knee    []  Laser with stim  []  Other:  Position:  Location:    []  Vasopneumatic Device Pressure:       [] lo [] med [] hi   Temperature: [] lo [] med [] hi   [] Skin assessment post-treatment:  []intact []redness- no adverse reaction    []redness - adverse reaction:     34 min Therapeutic Exercise:  [x] See flow sheet : Rationale: increase ROM and increase strength to improve the patients ability to perform ADLs    10 min Manual Therapy:  Patella mobs, knee flex/ext jt mobs, PROM to left knee   Rationale: decrease pain, increase ROM and increase tissue extensibility to improve functional mobility            With   [] TE   [] TA   [] neuro   [] other: Patient Education: [x] Review HEP    [] Progressed/Changed HEP based on:   [] positioning   [] body mechanics   [] transfers   [] heat/ice application    [] other:      Other Objective/Functional Measures:        Pain Level (0-10 scale) post treatment: 1    ASSESSMENT/Changes in Function: Cont's to be limited with left knee ROM. Reports medial knee pain with end range knee flex stretching. Patient will continue to benefit from skilled PT services to modify and progress therapeutic interventions, address functional mobility deficits, address ROM deficits, address strength deficits, analyze and address soft tissue restrictions, analyze and cue movement patterns and analyze and modify body mechanics/ergonomics to attain remaining goals. []  See Plan of Care  []  See progress note/recertification  []  See Discharge Summary         Progress towards goals / Updated goals:  Goals for this certification period to be accomplished in  weeks:  1. The patient will improve FOTO score to predicted value to improve ease of ADLs.  Progressing- DORU 05 (goal 54) 8/7/19     2. The patient will improve knee flexion to 105 degrees to improve ease of transfers. Improved to 83 degrees 7/31/2019, progressing 8/14/19- 86 degrees   3. The patient will improve quad strength to 5/5 MMT to maximize stability in stance. Progressing- MMT right quad 4/5 8/9/19    4. The patient will demonstrate reciprocal negotiation of stairs to maximize ease of stair negotiation.  Progressing- Pt negotiates stairs with bilateral HR and step-to pattern 8/9/19    PLAN  []  Upgrade activities as tolerated     [x]  Continue plan of care  []  Update interventions per flow sheet       []  Discharge due to:_  []  Other:_      Jd Jon, PTA 8/19/2019  7:37 AM    Future Appointments   Date Time Provider Shubham Wayne   8/27/2019  7:30 AM Erick Lima, PTA [facility]   8/30/2019  7:30 AM Gamaliel Grewal, PT [facility]   9/3/2019  7:30 AM Corita Leather, PTA [facility]   9/5/2019  7:30 AM Corita Leather, PTA [facility]   9/10/2019  7:30 AM Corita Leather, PTA [facility]

## 2019-08-27 ENCOUNTER — HOSPITAL ENCOUNTER (OUTPATIENT)
Dept: PHYSICAL THERAPY | Age: 74
Discharge: HOME | End: 2019-08-27
Payer: MEDICARE

## 2019-08-27 PROCEDURE — 97016 VASOPNEUMATIC DEVICE THERAPY: CPT

## 2019-08-27 PROCEDURE — 97110 THERAPEUTIC EXERCISES: CPT

## 2019-08-27 PROCEDURE — 97140 MANUAL THERAPY 1/> REGIONS: CPT

## 2019-08-30 ENCOUNTER — HOSPITAL ENCOUNTER (OUTPATIENT)
Dept: PHYSICAL THERAPY | Age: 74
Discharge: HOME | End: 2019-08-30
Payer: MEDICARE

## 2019-08-30 PROCEDURE — 97110 THERAPEUTIC EXERCISES: CPT

## 2019-08-30 PROCEDURE — 97140 MANUAL THERAPY 1/> REGIONS: CPT

## 2019-08-30 NOTE — PROGRESS NOTES
PT DAILY TREATMENT NOTE 10-18    Patient Name: Lisette Celaya  Date:2019  : 1945  [x]  Patient  Verified  Payor: VA MEDICARE / Plan: VA MEDICARE PART A & B / Product Type: Medicare /    In time: 7:28  Out time:8:16  Total Treatment Time (min): 48  Visit #: 5 of     Medicare/BCBS Only   Total Timed Codes (min):  48 1:1 Treatment Time:  30       Treatment Area: Pain in right knee [M25.561]    SUBJECTIVE  Pain Level (0-10 scale): 0/10  Any medication changes, allergies to medications, adverse drug reactions, diagnosis change, or new procedure performed?: [x] No    [] Yes (see summary sheet for update)  Subjective functional status/changes:   [] No changes reported  The patient reports swelling and stiffness, though no apparent swelling in comparison to left notable by PT upon observation. OBJECTIVE  Modality rationale: decrease edema, decrease inflammation and decrease pain to improve the patients ability to improve ADL ease. Min Type Additional Details   10 [x]  Vasopneumatic Device Pressure:       [x] lo [] med [] hi   Temperature: [x] lo [] med [] hi   [] Skin assessment post-treatment:  []intact []redness- no adverse reaction    []redness - adverse reaction:       30 min Therapeutic Exercise:  [x] See flow sheet :   Rationale: increase ROM and increase strength to improve the patients ability to improve ADL ease. 8 min Manual Therapy:  Right tibiofemoral mobs and scar tissue mobilization in sitting. Rationale: decrease pain, increase ROM and increase tissue extensibility to improve ADL ease.         With   [] TE   [] TA   [] neuro   [] other: Patient Education: [x] Review HEP    [] Progressed/Changed HEP based on:   [] positioning   [] body mechanics   [] transfers   [] heat/ice application    [] other:      Other Objective/Functional Measures:   PROM  Right knee flexion: 82 degrees  Right knee extension: lacking 10 degrees      Pain Level (0-10 scale) post treatment: 0/10    ASSESSMENT/Changes in Function: The patient continues to be significantly limited with regards to passive mobility noting 82 degrees of passive flexion. Thoroughly reviewed with the patient the importance of performing HEP, of which, the patient admits she has not been compliant. Encouraged the patient continue with HEP. Patient will continue to benefit from skilled PT services to modify and progress therapeutic interventions, address functional mobility deficits, address ROM deficits, address strength deficits, analyze and address soft tissue restrictions, analyze and cue movement patterns, analyze and modify body mechanics/ergonomics, assess and modify postural abnormalities, address imbalance/dizziness and instruct in home and community integration to attain remaining goals. []  See Plan of Care  []  See progress note/recertification  []  See Discharge Summary         Progress towards goals / Updated goals:  Goals for this certification period to be accomplished in  weeks:  1. The patient will improve FOTO score to predicted value to improve ease of ADLs.  Progressing- KZLU 84 (goal 54) 8/7/19     2. The patient will improve knee flexion to 105 degrees to improve ease of transfers. Improved to 83 degrees 7/31/2019, progressing 8/14/19- 86 degrees; Regressed to 82 degrees 8/30/2019   3. The patient will improve quad strength to 5/5 MMT to maximize stability in stance. Progressing- MMT right quad 4/5 8/9/19    4. The patient will demonstrate reciprocal negotiation of stairs to maximize ease of stair negotiation.  Progressing- Pt negotiates stairs with bilateral HR and step-to pattern 8/9/19       PLAN  []  Upgrade activities as tolerated     [x]  Continue plan of care  []  Update interventions per flow sheet       []  Discharge due to:_  []  Other:_      Gavin Gore PT 8/30/2019  7:28 AM    Future Appointments   Date Time Provider Shubham Wayne   8/30/2019  7:30 AM Joo Glasgow PT [facility]   9/3/2019  7:30 AM [place], PTA [facility]   9/5/2019  7:30 AM [place], PTA [facility]   9/10/2019  7:30 AM Chari [place], PTA [facility]

## 2019-09-03 ENCOUNTER — HOSPITAL ENCOUNTER (OUTPATIENT)
Dept: PHYSICAL THERAPY | Age: 74
Discharge: HOME | End: 2019-09-03
Payer: MEDICARE

## 2019-09-03 PROCEDURE — 97110 THERAPEUTIC EXERCISES: CPT

## 2019-09-03 PROCEDURE — 97016 VASOPNEUMATIC DEVICE THERAPY: CPT

## 2019-09-03 PROCEDURE — 97112 NEUROMUSCULAR REEDUCATION: CPT

## 2019-09-03 PROCEDURE — 97140 MANUAL THERAPY 1/> REGIONS: CPT

## 2019-09-03 NOTE — PROGRESS NOTES
PT DAILY TREATMENT NOTE 10-18    Patient Name: Sandie Velazco  Date:9/3/2019  : 1945  [x]  Patient  Verified  Payor: [place] MEDICARE / Plan: [place] MEDICARE PART A & B / Product Type: Medicare /    In time:7:30  Out time:8:20  Total Treatment Time (min): 50  Visit #: 6 of -    Medicare/BCBS Only   Total Timed Codes (min):  40 1:1 Treatment Time:  40       Treatment Area: Pain in right knee [M25.561]    SUBJECTIVE  Pain Level (0-10 scale): 0/10  Any medication changes, allergies to medications, adverse drug reactions, diagnosis change, or new procedure performed?: [x] No    [] Yes (see summary sheet for update)  Subjective functional status/changes:   [] No changes reported  Pt reports no new complaints of pain. Pt reports compliance with HEP. OBJECTIVE    Modality rationale: decrease inflammation and decrease pain to improve the patients ability to tolerate ADLs.     Min Type Additional Details    [] Estim:  []Unatt       []IFC  []Premod                        []Other:  []w/ice   []w/heat  Position:  Location:    [] Estim: []Att    []TENS instruct  []NMES                    []Other:  []w/US   []w/ice   []w/heat  Position:  Location:    []  Traction: [] Cervical       []Lumbar                       [] Prone          []Supine                       []Intermittent   []Continuous Lbs:  [] before manual  [] after manual    []  Ultrasound: []Continuous   [] Pulsed                           []1MHz   []3MHz W/cm2:  Location:    []  Iontophoresis with dexamethasone         Location: [] Take home patch   [] In clinic    []  Ice     []  heat  []  Ice massage  []  Laser   []  Anodyne Position:  Location:    []  Laser with stim  []  Other:  Position:  Location:   10 [x]  Vasopneumatic Device Pressure:       [x] lo [] med [] hi   Temperature: [x] lo [] med [] hi   [] Skin assessment post-treatment:  []intact []redness- no adverse reaction    []redness - adverse reaction:       22 min Therapeutic Exercise:  [] See flow sheet :   Rationale: increase ROM and increase strength to improve the patients ability to tolerate ADLs. 10 min Manual Therapy:  PROM to right knee   Rationale: decrease pain, increase ROM and increase tissue extensibility to tolerate ADLs. 8 min Gait Trainin feet with no device on level surfaces with SBA level of assist   Rationale: With   [] TE   [] TA   [] neuro   [] other: Patient Education: [x] Review HEP    [] Progressed/Changed HEP based on:   [] positioning   [] body mechanics   [] transfers   [] heat/ice application    [] other:      Other Objective/Functional Measures: Continued limited right knee flexion when performing PROM. Pain Level (0-10 scale) post treatment: 0/10    ASSESSMENT/Changes in Function: Pt has minimal progress toward goals due to limited compliance with HEP. Discussed importance of HEP compliance. Patient will continue to benefit from skilled PT services to modify and progress therapeutic interventions, address functional mobility deficits, address ROM deficits, address strength deficits, analyze and address soft tissue restrictions, analyze and cue movement patterns and analyze and modify body mechanics/ergonomics to attain remaining goals. []  See Plan of Care  []  See progress note/recertification  []  See Discharge Summary         Progress towards goals / Updated goals:  Goals for this certification period to be accomplished in  weeks:  1. The patient will improve FOTO score to predicted value to improve ease of ADLs.  Progressing- UHPP 39 (goal 54) 19     2. The patient will improve knee flexion to 105 degrees to improve ease of transfers. Improved to 83 degrees 2019, progressing 19- 86 degrees; Regressed to 82 degrees 2019   3. The patient will improve quad strength to 5/5 MMT to maximize stability in stance. Progressing- MMT right quad 4/5 19    4.  The patient will demonstrate reciprocal negotiation of stairs to maximize ease of stair negotiation.  Progressing- Pt negotiates stairs with bilateral HR and step-to pattern 8/9/19    PLAN  []  Upgrade activities as tolerated     [x]  Continue plan of care  []  Update interventions per flow sheet       []  Discharge due to:_  []  Other:_      Mely Dolan PTA 9/3/2019  7:46 AM    Future Appointments   Date Time Provider Shubham Wayne   9/5/2019  7:30 AM JAMIE Zepeda [facility]   9/10/2019  7:30 AM JAMIE Zepeda [facility]

## 2019-09-05 ENCOUNTER — APPOINTMENT (OUTPATIENT)
Dept: PHYSICAL THERAPY | Age: 74
End: 2019-09-05
Payer: MEDICARE

## 2019-09-10 ENCOUNTER — HOSPITAL ENCOUNTER (OUTPATIENT)
Dept: PHYSICAL THERAPY | Age: 74
Discharge: HOME | End: 2019-09-10
Payer: MEDICARE

## 2019-09-10 PROCEDURE — 97016 VASOPNEUMATIC DEVICE THERAPY: CPT

## 2019-09-10 PROCEDURE — 97116 GAIT TRAINING THERAPY: CPT

## 2019-09-10 PROCEDURE — 97110 THERAPEUTIC EXERCISES: CPT

## 2019-09-10 NOTE — PROGRESS NOTES
[facility]  [address]  [place], [address]  [phone] fax    Medicare Progress Report    Patient name: Maris Walls Start of Care: 2019   Referral Kyler Herron MD : 1945               Medical Diagnosis: Pain in right knee [M25.561]  Payor: [place] MEDICARE / Plan: VA MEDICARE PART A & B / Product Type: Medicare /  Onset Date:pain has worsened in the last year               Treatment Diagnosis: right knee pain   Prior Hospitalization: see medical history Provider#: [id number]   Medications: Verified on Patient summary List    Comorbidities: Arthritis, Back pain, BMI over 27, DM, HTN, kidney/bladder/urination problems, Prior Surgery- kidney removed, shoulder surgery, hysterectomy, .    Prior Level of Function: Patient has been utilizing a SPC for the last 3 years, and is able to do light household management.   Visits from Start of Care: 17    Missed Visits: 0    Reporting Period: 2019 to 9/10/2019    Subjective Reports: The patient reports compliance with her exercises. She states she feels her knee is stiff. Current Status/ treatment goals Objective measures   Goals for this certification period to be accomplished in  weeks:  1. The patient will improve FOTO score to predicted value to improve ease of ADLs.  Progressing- FOTO 41. 9/10/19   2. The patient will improve knee flexion to 105 degrees to improve ease of transfers. Regressed to 80. 9/10/19   3. The patient will improve quad strength to 5/5 MMT to maximize stability in stance. Progressing- MMT right quad 4+/5 9/10/19    4. The patient will demonstrate reciprocal negotiation of stairs to maximize ease of stair negotiation. Progressing- Pt negotiates stairs with bilateral HR and step-to pattern 19; No change 9/10/19    Key functional changes:  The patient has improved her stance phase of gait and stability through her right, noting a greater degree of limited gait stemming from her left knee/LE. Her FOTO score has been inconsistent, and decreased slightly since last assessment. Her flexion ROM has been significantly limited, causing difficulty with stair negotiation. She will continue to benefit from 2 additional weeks of PT in order to progress functional stair negotiation and ambulation efficiency. Problems/ barriers to goal attainment: elevated BMI. Assessment / Recommendations: The patient will benefit from an additional 2 weeks of PT to progress functional ambulation and stair negotiation for improved ease of home navigation. Problem List: pain affecting function, decrease ROM, decrease strength, impaired gait/ balance, decrease ADL/ functional abilitiies, decrease activity tolerance, decrease flexibility/ joint mobility and decrease transfer abilities   Treatment Plan: Therapeutic exercise, Therapeutic activities, Neuromuscular re-education, Physical agent/modality, Gait/balance training, Manual therapy, Patient education, Functional mobility training, Home safety training and Stair training    Patient Goal (s) has been updated and includes: less stiffness     Updated Goals to be accomplished in 2 weeks:  1. The patient will navigate 5 stairs with reciprocal gait pattern utilizing 1 HR to maximize ease of stair negotiation. 2. The patient will improve FOTO score to 55 to maximize quality of life. 3. The patient will improve flexion ROM to 90 degrees to improve ease of transfers.      Frequency / Duration: Patient to be seen 2 times per week for 2 weeks:      Brayan Hanson, PT 9/10/2019 5:52 PM

## 2019-09-10 NOTE — PROGRESS NOTES
PT DAILY TREATMENT NOTE 10-18    Patient Name: Lisette Celaya  Date:9/10/2019  : 1945  [x]  Patient  Verified  Payor: VA MEDICARE / Plan: VA MEDICARE PART A & B / Product Type: Medicare /    In time:7:30  Out time:8:15  Total Treatment Time (min): 45  Visit #: 7 of     Medicare/BCBS Only   Total Timed Codes (min):  35 1:1 Treatment Time:  35       Treatment Area: Pain in right knee [M25.561]    SUBJECTIVE  Pain Level (0-10 scale): 3/10  Any medication changes, allergies to medications, adverse drug reactions, diagnosis change, or new procedure performed?: [x] No    [] Yes (see summary sheet for update)  Subjective functional status/changes:   [] No changes reported  Pt reports no new complaints of pain. Pt reports compliance with    OBJECTIVE    Modality rationale: decrease inflammation and decrease pain to improve the patients ability to tolerate ADLs.    Min Type Additional Details    [] Estim:  []Unatt       []IFC  []Premod                        []Other:  []w/ice   []w/heat  Position:  Location:    [] Estim: []Att    []TENS instruct  []NMES                    []Other:  []w/US   []w/ice   []w/heat  Position:  Location:    []  Traction: [] Cervical       []Lumbar                       [] Prone          []Supine                       []Intermittent   []Continuous Lbs:  [] before manual  [] after manual    []  Ultrasound: []Continuous   [] Pulsed                           []1MHz   []3MHz W/cm2:  Location:    []  Iontophoresis with dexamethasone         Location: [] Take home patch   [] In clinic    []  Ice     []  heat  []  Ice massage  []  Laser   []  Anodyne Position:  Location:    []  Laser with stim  []  Other:  Position:  Location:   10 [x]  Vasopneumatic Device Pressure:       [x] lo [] med [] hi   Temperature: [x] lo [] med [] hi   [] Skin assessment post-treatment:  []intact []redness- no adverse reaction    []redness - adverse reaction:     27 min Therapeutic Exercise:  [x] See flow sheet :   Rationale: increase ROM and increase strength to improve the patients ability to tolerate ADLs. 8 min Gait Training:  Stairs x3; 120 feet with no device on level surfaces with SBA level of assist   Rationale: With   [] TE   [] TA   [] neuro   [] other: Patient Education: [x] Review HEP    [] Progressed/Changed HEP based on:   [] positioning   [] body mechanics   [] transfers   [] heat/ice application    [] other:      Other Objective/Functional Measures: added step taps to 8 inch step. AROM knee flexion 75 degrees  PROM knee flexion 80 degrees  Right knee MMT 4+/5  FOTO 41     Pain Level (0-10 scale) post treatment: 0/10    ASSESSMENT/Changes in Function: Pt continues to demonstrate limited right knee flexion due to soft tissue approximation and other soft tissue restrictions. Patient will continue to benefit from skilled PT services to modify and progress therapeutic interventions, address functional mobility deficits, address ROM deficits, address strength deficits, analyze and address soft tissue restrictions, analyze and cue movement patterns, analyze and modify body mechanics/ergonomics and assess and modify postural abnormalities to attain remaining goals. []  See Plan of Care  []  See progress note/recertification  []  See Discharge Summary         Progress towards goals / Updated goals:  Goals for this certification period to be accomplished in  weeks:  1. The patient will improve FOTO score to predicted value to improve ease of ADLs.  Progressing- FOTO 41. 9/10/19   2. The patient will improve knee flexion to 105 degrees to improve ease of transfers. Regressed to 80. 9/10/19   3. The patient will improve quad strength to 5/5 MMT to maximize stability in stance. Progressing- MMT right quad 4+/5 9/10/19    4. The patient will demonstrate reciprocal negotiation of stairs to maximize ease of stair negotiation.  Progressing- Pt negotiates stairs with bilateral HR and step-to pattern 8/9/19;  No change 9/10/19    PLAN  []  Upgrade activities as tolerated     [x]  Continue plan of care  []  Update interventions per flow sheet       []  Discharge due to:_  []  Other:_      Delgado Pichardo PTA 9/10/2019  7:37 AM    Future Appointments   Date Time Provider Shubham Wayne   9/13/2019  7:30 AM Nell Kussmaul, PTA [facility]

## 2019-09-13 ENCOUNTER — HOSPITAL ENCOUNTER (OUTPATIENT)
Dept: PHYSICAL THERAPY | Age: 74
Discharge: HOME | End: 2019-09-13
Payer: MEDICARE

## 2019-09-13 PROCEDURE — 97530 THERAPEUTIC ACTIVITIES: CPT

## 2019-09-13 PROCEDURE — 97016 VASOPNEUMATIC DEVICE THERAPY: CPT

## 2019-09-13 PROCEDURE — 97110 THERAPEUTIC EXERCISES: CPT

## 2019-09-13 NOTE — PROGRESS NOTES
PT DAILY TREATMENT NOTE 10-18    Patient Name: Arianne Aus  Date:2019  : 1945  [x]  Patient  Verified  Payor: VA MEDICARE / Plan: VA MEDICARE PART A & B / Product Type: Medicare /    In time:7:22  Out time:8:10  Total Treatment Time (min): 48  Visit #: 1 of 4    Medicare/BCBS Only   Total Timed Codes (min):  38 1:1 Treatment Time:  38       Treatment Area: Pain in right knee [M25.561]    SUBJECTIVE  Pain Level (0-10 scale): 3  Any medication changes, allergies to medications, adverse drug reactions, diagnosis change, or new procedure performed?: [x] No    [] Yes (see summary sheet for update)  Subjective functional status/changes:   [] No changes reported  Pt reports feeling tired today    OBJECTIVE    Modality rationale: decrease pain to improve the patients ability to perform daily tasks   Min Type Additional Details    [] Estim:  []Unatt       []IFC  []Premod                        []Other:  []w/ice   []w/heat  Position:  Location:    [] Estim: []Att    []TENS instruct  []NMES                    []Other:  []w/US   []w/ice   []w/heat  Position:  Location:    []  Traction: [] Cervical       []Lumbar                       [] Prone          []Supine                       []Intermittent   []Continuous Lbs:  [] before manual  [] after manual    []  Ultrasound: []Continuous   [] Pulsed                           []1MHz   []3MHz W/cm2:  Location:    []  Iontophoresis with dexamethasone         Location: [] Take home patch   [] In clinic    []  Ice     []  heat  []  Ice massage  []  Laser   []  Anodyne Position:  Location:    []  Laser with stim  []  Other:  Position:  Location:   10 [x]  Vasopneumatic Device Pressure:       [x] lo [] med [] hi   Temperature: [x] lo [] med [] hi   [] Skin assessment post-treatment:  []intact []redness- no adverse reaction    []redness - adverse reaction:     30 min Therapeutic Exercise:  [x] See flow sheet :   Rationale: increase ROM, increase strength and increase proprioception to improve the patients ability to perform ADLs    8 min Therapeutic Activity:  [x]  See flow sheet :    Rationale: increase ROM, increase strength, improve coordination and increase proprioception  to improve the patients ability to perform functional tasks           With   [] TE   [] TA   [] neuro   [] other: Patient Education: [x] Review HEP    [] Progressed/Changed HEP based on:   [] positioning   [] body mechanics   [] transfers   [] heat/ice application    [] other:      Other Objective/Functional Measures:   Pt amb without SPC for the duration of PT appt   Added dynamic gait: marches and sidestepping    Pain Level (0-10 scale) post treatment: 0    ASSESSMENT/Changes in Function: Pt cont's to be limited with knee ROM, mayur flex. Pt amb in clinic without AD with excessive lateral sway, reports fear of left knee buckling. Notes right knee pain with descending stairs. Patient will continue to benefit from skilled PT services to modify and progress therapeutic interventions, address functional mobility deficits, address ROM deficits, address strength deficits, analyze and address soft tissue restrictions, analyze and cue movement patterns and analyze and modify body mechanics/ergonomics to attain remaining goals. []  See Plan of Care  []  See progress note/recertification  []  See Discharge Summary         Progress towards goals / Updated goals:  Updated Goals to be accomplished in 2 weeks:  1. The patient will navigate 5 stairs with reciprocal gait pattern utilizing 1 HR to maximize ease of stair negotiation. 2. The patient will improve FOTO score to 55 to maximize quality of life. 3. The patient will improve flexion ROM to 90 degrees to improve ease of transfers.      PLAN  []  Upgrade activities as tolerated     [x]  Continue plan of care  []  Update interventions per flow sheet       []  Discharge due to:_  []  Other:_      Nayely Jon, JAMIE 9/13/2019  7:23 AM    Future Appointments Date Time Provider Shubham Rosana   9/13/2019  7:30 AM Diana Oneill, PTA [facility]

## 2019-09-16 ENCOUNTER — APPOINTMENT (OUTPATIENT)
Dept: PHYSICAL THERAPY | Age: 74
End: 2019-09-16
Payer: MEDICARE

## 2019-09-18 ENCOUNTER — HOSPITAL ENCOUNTER (OUTPATIENT)
Dept: PHYSICAL THERAPY | Age: 74
Discharge: HOME | End: 2019-09-18
Payer: MEDICARE

## 2019-09-18 PROCEDURE — 97110 THERAPEUTIC EXERCISES: CPT

## 2019-09-18 PROCEDURE — 97530 THERAPEUTIC ACTIVITIES: CPT

## 2019-09-18 NOTE — PROGRESS NOTES
PT DAILY TREATMENT NOTE 10-18    Patient Name: Aki Fee  Date:2019  : 1945  [x]  Patient  Verified  Payor: VA MEDICARE / Plan: VA MEDICARE PART A & B / Product Type: Medicare /    In time:7:33  Out time:8:26  Total Treatment Time (min): 48  Visit #: 2 of 4    Medicare/BCBS Only   Total Timed Codes (min):  43 1:1 Treatment Time:  43       Treatment Area: Pain in right knee [M25.561]    SUBJECTIVE  Pain Level (0-10 scale): 4  Any medication changes, allergies to medications, adverse drug reactions, diagnosis change, or new procedure performed?: [x] No    [] Yes (see summary sheet for update)  Subjective functional status/changes:   [] No changes reported  Pt reports she was trying to do the exercises at home but it was causing her pain in the right knee so she stopped and put ice on it. OBJECTIVE    Modality rationale: decrease pain to improve the patients ability to improve ADL's with ease.    Min Type Additional Details    [] Estim:  []Unatt       []IFC  []Premod                        []Other:  []w/ice   []w/heat  Position:  Location:    [] Estim: []Att    []TENS instruct  []NMES                    []Other:  []w/US   []w/ice   []w/heat  Position:  Location:    []  Traction: [] Cervical       []Lumbar                       [] Prone          []Supine                       []Intermittent   []Continuous Lbs:  [] before manual  [] after manual    []  Ultrasound: []Continuous   [] Pulsed                           []1MHz   []3MHz W/cm2:  Location:    []  Iontophoresis with dexamethasone         Location: [] Take home patch   [] In clinic    []  Ice     []  heat  []  Ice massage  []  Laser   []  Anodyne Position:  Location:    []  Laser with stim  []  Other:  Position:  Location:   10 [x]  Vasopneumatic Device Pressure:       [] lo [x] med [] hi   Temperature: [] lo [x] med [] hi   [x] Skin assessment post-treatment:  [x]intact []redness- no adverse reaction    []redness - adverse reaction: 33 min Therapeutic Exercise:  [x] See flow sheet :   Rationale: increase ROM, increase strength and improve balance to improve the patients ability to perform functional task with ease. 10 min Therapeutic Activity:  [x]  See flow sheet :   Rationale: increase ROM, increase strength, improve coordination and improve balance  to improve the patients ability to perform functional activities with ease. With   [] TE   [] TA   [] neuro   [] other: Patient Education: [x] Review HEP    [] Progressed/Changed HEP based on:   [] positioning   [] body mechanics   [] transfers   [] heat/ice application    [] other:      Other Objective/Functional Measures:   1# added to LAQ     Pain Level (0-10 scale) post treatment: 2    ASSESSMENT/Changes in Function:   Circumduction of the right hip noted during step-ups with poor carry over after correction. Pt continues to display difficulty with stair negotiation due to decreased right knee flex and pain. Pt navigated steps in a step over step pattern utilizing max UE support. . Pt cued to decrease UE pull when negotiating stairs. Patient will continue to benefit from skilled PT services to modify and progress therapeutic interventions, address functional mobility deficits, address ROM deficits, address strength deficits, analyze and address soft tissue restrictions, analyze and cue movement patterns, analyze and modify body mechanics/ergonomics and assess and modify postural abnormalities to attain remaining goals. [x]  See Plan of Care  []  See progress note/recertification  []  See Discharge Summary         Progress towards goals / Updated goals:  Updated Goals to be accomplished in 2 weeks:  1. The patient will navigate 5 stairs with reciprocal gait pattern utilizing 1 HR to maximize ease of stair negotiation. Not Met 9/18/19  2. The patient will improve FOTO score to 55 to maximize quality of life.   3. The patient will improve flexion ROM to 90 degrees to improve ease of transfers    PLAN  []  Upgrade activities as tolerated     [x]  Continue plan of care  []  Update interventions per flow sheet       []  Discharge due to:_  []  Other:_      Brain Sims, JAMIE 9/18/2019  6:45 AM    Future Appointments   Date Time Provider Shubham Wayne   9/18/2019  7:30 AM Toy Redd PTA [facility]   9/19/2019  7:30 AM Rebel Grimaldo PTA [facility]   9/25/2019  7:30 AM Toy Redd PTA [facility]   9/27/2019  7:30 AM Marlyn Roland PTA [facility]   10/1/2019  7:30 AM Mae Block, PT [facility]

## 2019-09-19 ENCOUNTER — HOSPITAL ENCOUNTER (OUTPATIENT)
Dept: PHYSICAL THERAPY | Age: 74
Discharge: HOME | End: 2019-09-19
Payer: MEDICARE

## 2019-09-19 PROCEDURE — 97110 THERAPEUTIC EXERCISES: CPT

## 2019-09-19 PROCEDURE — 97016 VASOPNEUMATIC DEVICE THERAPY: CPT

## 2019-09-19 NOTE — PROGRESS NOTES
PT DAILY TREATMENT NOTE 10-18    Patient Name: Aki Fee  Date:2019  : 1945  [x]  Patient  Verified  Payor: Stephen Salamanca / Plan: [place] MED[place] & B / Product Type: Medicare /    In time:7:33  Out time:8:20  Total Treatment Time (min): 47  Visit #: 3 of 4    Medicare/BCBS Only   Total Timed Codes (min):  37 1:1 Treatment Time:  37       Treatment Area: Pain in right knee [M25.561]    SUBJECTIVE  Pain Level (0-10 scale):  2  Any medication changes, allergies to medications, adverse drug reactions, diagnosis change, or new procedure performed?: [x] No    [] Yes (see summary sheet for update)  Subjective functional status/changes:   [x] No changes reported      OBJECTIVE    Modality rationale: decrease inflammation and decrease pain to improve the patients ability to perform daily tasks   Min Type Additional Details    [] Estim:  []Unatt       []IFC  []Premod                        []Other:  []w/ice   []w/heat  Position:  Location:    [] Estim: []Att    []TENS instruct  []NMES                    []Other:  []w/US   []w/ice   []w/heat  Position:  Location:    []  Traction: [] Cervical       []Lumbar                       [] Prone          []Supine                       []Intermittent   []Continuous Lbs:  [] before manual  [] after manual    []  Ultrasound: []Continuous   [] Pulsed                           []1MHz   []3MHz W/cm2:  Location:    []  Iontophoresis with dexamethasone         Location: [] Take home patch   [] In clinic    []  Ice     []  heat  []  Ice massage  []  Laser   []  Anodyne Position:  Location:    []  Laser with stim  []  Other:  Position:  Location:   10 [x]  Vasopneumatic Device Pressure:       [x] lo [] med [] hi   Temperature: [x] lo [] med [] hi   [] Skin assessment post-treatment:  []intact []redness- no adverse reaction    []redness - adverse reaction:       37 min Therapeutic Exercise:  [x] See flow sheet :   Rationale: increase ROM, increase strength, improve balance and increase proprioception to improve the patients ability to perform ADLs            With   [] TE   [] TA   [] neuro   [] other: Patient Education: [x] Review HEP    [] Progressed/Changed HEP based on:   [] positioning   [] body mechanics   [] transfers   [] heat/ice application    [] other:      Other Objective/Functional Measures: Added lateral step up       Pain Level (0-10 scale) post treatment: 0    ASSESSMENT/Changes in Function: Pt cont's to be challenged with stair negotiation due to lack of right knee flex and left knee pain. Also challenged with dynamic gait due to left knee instability and pain. Cont progressing ROM and strength as tolerated to ease with functional tasks. Patient will continue to benefit from skilled PT services to modify and progress therapeutic interventions, address functional mobility deficits, address ROM deficits, address strength deficits, analyze and address soft tissue restrictions, analyze and cue movement patterns and analyze and modify body mechanics/ergonomics to attain remaining goals. []  See Plan of Care  []  See progress note/recertification  []  See Discharge Summary         Progress towards goals / Updated goals:  Updated Goals to be accomplished in 2 weeks:  1. The patient will navigate 5 stairs with reciprocal gait pattern utilizing 1 HR to maximize ease of stair negotiation. Not Met 9/18/19  2. The patient will improve FOTO score to 55 to maximize quality of life.   3. The patient will improve flexion ROM to 90 degrees to improve ease of transfers    PLAN  []  Upgrade activities as tolerated     [x]  Continue plan of care  []  Update interventions per flow sheet       []  Discharge due to:_  []  Other:_      Malika Jon PTA 9/19/2019  7:36 AM    Future Appointments   Date Time Provider Shubham Wayne   9/25/2019  7:30 AM Grupo Koch PTA [facility]   9/27/2019  7:30 AM Gracie Badillo PTA [facility]   10/1/2019  7:30 AM [facility]

## 2019-09-25 ENCOUNTER — APPOINTMENT (OUTPATIENT)
Dept: PHYSICAL THERAPY | Age: 74
End: 2019-09-25
Payer: MEDICARE

## 2019-09-27 ENCOUNTER — APPOINTMENT (OUTPATIENT)
Dept: PHYSICAL THERAPY | Age: 74
End: 2019-09-27
Payer: MEDICARE

## 2019-10-01 ENCOUNTER — APPOINTMENT (OUTPATIENT)
Dept: PHYSICAL THERAPY | Age: 74
End: 2019-10-01
Payer: MEDICARE

## 2019-10-02 ENCOUNTER — HOSPITAL ENCOUNTER (OUTPATIENT)
Dept: PHYSICAL THERAPY | Age: 74
Discharge: HOME | End: 2019-10-02
Payer: MEDICARE

## 2019-10-02 PROCEDURE — 97530 THERAPEUTIC ACTIVITIES: CPT

## 2019-10-02 PROCEDURE — 97110 THERAPEUTIC EXERCISES: CPT

## 2019-10-02 NOTE — PROGRESS NOTES
Physical Therapy Discharge Instructions      [facility]  [address] [place] [address] [address]  [phone] fax    Patient: Marvia Seip  : 1945      Continue Home Exercise Program 2-3 times per day for 6-8 weeks, then decrease to 1-2 times per week.       Continue with    [x] Ice  as needed 2-3 times per day     [] Heat           Follow up with MD:     [x] Upon completion of therapy     [x] As needed      Recommendations:     [x]   Return to activity with home program    []   Return to activity with the following modifications:       []Post Rehab Program    []Join Independent aquatic program     []Return to/join local gym        Additional Comments:           Sixto Heck PTA 10/2/2019 8:09 AM

## 2019-10-02 NOTE — PROGRESS NOTES
PT DAILY TREATMENT NOTE 10-18    Patient Name: Evelia Prince  Date:10/2/2019  : 1945  [x]  Patient  Verified  Payor: [place] MEDICARE / Plan: [place] MEDICARE PART A & B / Product Type: Medicare /    In time:7:30  Out time:8:28  Total Treatment Time (min): 62  Visit #: 4 of 4    Medicare/BCBS Only   Total Timed Codes (min):  48 1:1 Treatment Time:  48       Treatment Area: Pain in right knee [M25.561]    SUBJECTIVE  Pain Level (0-10 scale): 4  Any medication changes, allergies to medications, adverse drug reactions, diagnosis change, or new procedure performed?: [x] No    [] Yes (see summary sheet for update)  Subjective functional status/changes:   [] No changes reported  PT reports she is not having the best day today and her knee aches. OBJECTIVE    Modality rationale: decrease inflammation and decrease pain to improve the patients ability to perform functional task with ease.    Min Type Additional Details    [] Estim:  []Unatt       []IFC  []Premod                        []Other:  []w/ice   []w/heat  Position:  Location:    [] Estim: []Att    []TENS instruct  []NMES                    []Other:  []w/US   []w/ice   []w/heat  Position:  Location:    []  Traction: [] Cervical       []Lumbar                       [] Prone          []Supine                       []Intermittent   []Continuous Lbs:  [] before manual  [] after manual    []  Ultrasound: []Continuous   [] Pulsed                           []1MHz   []3MHz W/cm2:  Location:    []  Iontophoresis with dexamethasone         Location: [] Take home patch   [] In clinic    []  Ice     []  heat  []  Ice massage  []  Laser   []  Anodyne Position:  Location:    []  Laser with stim  []  Other:  Position:  Location:   10 [x]  Vasopneumatic Device Pressure:       [x] lo [] med [] hi   Temperature: [x] lo [] med [] hi   [x] Skin assessment post-treatment:  [x]intact []redness- no adverse reaction    []redness - adverse reaction:         30 min Therapeutic Exercise:  [x] See flow sheet :   Rationale: increase ROM and increase strength to improve the patients ability to tolerate functional task. 18 min Therapeutic Activity:  [x]  See flow sheet :   Rationale: increase ROM, increase strength and improve coordination  to improve the patients ability to perform functional task with ease. With   [] TE   [] TA   [] neuro   [] other: Patient Education: [x] Review HEP    [] Progressed/Changed HEP based on:   [] positioning   [] body mechanics   [] transfers   [] heat/ice application    [] other:      Other Objective/Functional Measures:      Pain Level (0-10 scale) post treatment: 1    ASSESSMENT/Changes in Function:   Pt reports 70% improvement in the right knee since [facility] with remaining difficulties with some anterior knee pain with and without movement. Pt continues to lack right knee flexion limiting stair negotiation. Pt continues to navigate stairs with a step to step pattern requiring 1 HR ascending and 2 HR descending with noted pain in the anterior right knee. Pt continues to ambulate with an antalgic gate and reports pain with end range knee flexion. Pt discharged to [facility] to manage self-care at home. Patient will continue to benefit from skilled PT services to modify and progress therapeutic interventions, address functional mobility deficits, address ROM deficits, address strength deficits, analyze and address soft tissue restrictions, analyze and cue movement patterns, analyze and modify body mechanics/ergonomics and assess and modify postural abnormalities to attain remaining goals. [x]  See Plan of Care  []  See progress note/recertification  []  See Discharge Summary         Progress towards goals / Updated goals:  Updated Goals to be accomplished in 2 weeks:  1.  The patient will navigate 5 stairs with reciprocal gait pattern utilizing 1 HR to maximize ease of stair negotiation. Not Met 9/18/19, No change 10/2/19-Pt contiues to terri stairs in a step to step pattern with 1 HR ascending and 2 HR descending. 2. The patient will improve FOTO score to 55 to maximize quality of life. 3. The patient will improve flexion ROM to 90 degrees to improve ease of transfers.  Progressing 10/2/19- right knee flex ROM 88 deg       PLAN  []  Upgrade activities as tolerated     []  Continue plan of care  []  Update interventions per flow sheet       [x]  Discharge due to:_  []  Other:_      Kathleen Barth PTA 10/2/2019  7:22 AM    Future Appointments   Date Time Provider Shubham Wayne   10/4/2019  7:30 AM Christian Pollard PTA [facility]   10/9/2019  7:30 AM [facility]

## 2019-10-04 ENCOUNTER — APPOINTMENT (OUTPATIENT)
Dept: PHYSICAL THERAPY | Age: 74
End: 2019-10-04
Payer: MEDICARE

## 2019-10-09 ENCOUNTER — APPOINTMENT (OUTPATIENT)
Dept: PHYSICAL THERAPY | Age: 74
End: 2019-10-09
Payer: MEDICARE

## 2019-12-05 NOTE — PERIOP NOTES
PAT - SURGICAL PRE-ADMISSION INSTRUCTIONS    NAME:  Aneudy Walls                                                          TODAY'S DATE:  12/5/2019    SURGERY DATE:  12/9/2019                                  SURGERY ARRIVAL TIME:   0530    1. Do NOT eat or drink anything, including candy or gum, after MIDNIGHT on 12/8/19 , unless you have specific instructions from your Surgeon or Anesthesia Provider to do so. 2. No smoking on the day of surgery. 3. No alcohol 24 hours prior to the day of surgery. 4. No recreational drugs for one week prior to the day of surgery. 5. Leave all valuables, including money/purse, at home. 6. Remove all jewelry, nail polish, makeup (including mascara); no lotions, powders, deodorant, or perfume/cologne/after shave. 7. Glasses/Contact lenses and Dentures may be worn to the hospital.  They will be removed prior to surgery. 8. Call your doctor if symptoms of a cold or illness develop within 24 ours prior to surgery. 9. AN ADULT MUST DRIVE YOU HOME AFTER OUTPATIENT SURGERY. 10. If you are having an OUTPATIENT procedure, please make arrangements for a responsible adult to be with you for 24 hours after your surgery. 11. If you are admitted to the hospital, you will be assigned to a bed after surgery is complete. Normally a family member will not be able to see you until you are in your assigned bed. 15. Family is encouraged to accompany you to the hospital.  We ask visitors in the treatment area to be limited to ONE person at a time to ensure patient privacy. EXCEPTIONS WILL BE MADE AS NEEDED. 15. Children under 12 are discouraged from entering the treatment area and need to be supervised by an adult when in the waiting room.     Special Instructions:    STOP anticoagulants AT LEAST 1 WEEK PRIOR to your surgery or, follow other MD instructions:  ASPIRIN    Patient Prep:    shower with anti-bacterial soap    These surgical instructions were reviewed with PATIENT'S DAUGHTER during the PAT PHONE CALL. Directions: On the morning of surgery, please go to the [facility]. Enter the building from the [facility] entrance, 1st floor (next to the Emergency Room entrance). Take the elevator to the 2nd floor. Sign in at the Registration Desk.     If you have any questions and/or concerns, please do not hesitate to call:  (Prior to the day of surgery)  [facility] unit:  [phone]  (Day of surgery)   unit:  [phone]

## 2019-12-06 ENCOUNTER — ANESTHESIA EVENT (OUTPATIENT)
Dept: SURGERY | Age: 74
DRG: 470 | End: 2019-12-06
Payer: MEDICARE

## 2019-12-09 ENCOUNTER — ANESTHESIA (OUTPATIENT)
Dept: SURGERY | Age: 74
DRG: 470 | End: 2019-12-09
Payer: MEDICARE

## 2019-12-09 ENCOUNTER — HOSPITAL ENCOUNTER (INPATIENT)
Age: 74
LOS: 3 days | Discharge: NURSING, REHAB OR LONG-TERM CARE | DRG: 470 | End: 2019-12-12
Attending: ORTHOPAEDIC SURGERY | Admitting: ORTHOPAEDIC SURGERY
Payer: MEDICARE

## 2019-12-09 DIAGNOSIS — M17.0 TRICOMPARTMENT OSTEOARTHRITIS OF KNEES, BILATERAL: Primary | ICD-10-CM

## 2019-12-09 PROBLEM — M17.12 LEFT KNEE DJD: Status: ACTIVE | Noted: 2019-12-09

## 2019-12-09 LAB
GLUCOSE BLD STRIP.AUTO-MCNC: 101 MG/DL (ref 70–110)
GLUCOSE BLD STRIP.AUTO-MCNC: 183 MG/DL (ref 70–110)

## 2019-12-09 PROCEDURE — 77010033678 HC OXYGEN DAILY

## 2019-12-09 PROCEDURE — 77030019557 HC ELECTRD VES SEAL MEDT -F: Performed by: ORTHOPAEDIC SURGERY

## 2019-12-09 PROCEDURE — 74011250636 HC RX REV CODE- 250/636: Performed by: NURSE ANESTHETIST, CERTIFIED REGISTERED

## 2019-12-09 PROCEDURE — L1830 KO IMMOB CANVAS LONG PRE OTS: HCPCS | Performed by: ORTHOPAEDIC SURGERY

## 2019-12-09 PROCEDURE — 77030034075 HC SYR ASPIR ACDA INCL EXAC -G: Performed by: ORTHOPAEDIC SURGERY

## 2019-12-09 PROCEDURE — C1713 ANCHOR/SCREW BN/BN,TIS/BN: HCPCS | Performed by: ORTHOPAEDIC SURGERY

## 2019-12-09 PROCEDURE — 77030038269 HC DRN EXT URIN PURWCK BARD -A

## 2019-12-09 PROCEDURE — 65270000029 HC RM PRIVATE

## 2019-12-09 PROCEDURE — 76942 ECHO GUIDE FOR BIOPSY: CPT | Performed by: ANESTHESIOLOGY

## 2019-12-09 PROCEDURE — 77030003028 HC SUT VCRL J&J -A: Performed by: ORTHOPAEDIC SURGERY

## 2019-12-09 PROCEDURE — 77030006835 HC BLD SAW SAG STRY -B: Performed by: ORTHOPAEDIC SURGERY

## 2019-12-09 PROCEDURE — 74011250636 HC RX REV CODE- 250/636: Performed by: ORTHOPAEDIC SURGERY

## 2019-12-09 PROCEDURE — 76060000037 HC ANESTHESIA 3 TO 3.5 HR: Performed by: ORTHOPAEDIC SURGERY

## 2019-12-09 PROCEDURE — 74011000250 HC RX REV CODE- 250: Performed by: ORTHOPAEDIC SURGERY

## 2019-12-09 PROCEDURE — 77030021678 HC GLIDESCP STAT DISP VERT -B: Performed by: ANESTHESIOLOGY

## 2019-12-09 PROCEDURE — 74011250637 HC RX REV CODE- 250/637: Performed by: ORTHOPAEDIC SURGERY

## 2019-12-09 PROCEDURE — 77030018673: Performed by: ORTHOPAEDIC SURGERY

## 2019-12-09 PROCEDURE — 77030032490 HC SLV COMPR SCD KNE COVD -B: Performed by: ORTHOPAEDIC SURGERY

## 2019-12-09 PROCEDURE — 74011000250 HC RX REV CODE- 250: Performed by: NURSE ANESTHETIST, CERTIFIED REGISTERED

## 2019-12-09 PROCEDURE — 77030013708 HC HNDPC SUC IRR PULS STRY –B: Performed by: ORTHOPAEDIC SURGERY

## 2019-12-09 PROCEDURE — 77030002933 HC SUT MCRYL J&J -A: Performed by: ORTHOPAEDIC SURGERY

## 2019-12-09 PROCEDURE — 0SRD0J9 REPLACEMENT OF LEFT KNEE JOINT WITH SYNTHETIC SUBSTITUTE, CEMENTED, OPEN APPROACH: ICD-10-PCS | Performed by: ORTHOPAEDIC SURGERY

## 2019-12-09 PROCEDURE — 77030008477 HC STYL SATN SLP COVD -A: Performed by: ANESTHESIOLOGY

## 2019-12-09 PROCEDURE — 76010000133 HC OR TIME 3 TO 3.5 HR: Performed by: ORTHOPAEDIC SURGERY

## 2019-12-09 PROCEDURE — 74011000272 HC RX REV CODE- 272: Performed by: ORTHOPAEDIC SURGERY

## 2019-12-09 PROCEDURE — 77030006822 HC BLD SAW SAG BRSM -B: Performed by: ORTHOPAEDIC SURGERY

## 2019-12-09 PROCEDURE — 74011250637 HC RX REV CODE- 250/637: Performed by: NURSE ANESTHETIST, CERTIFIED REGISTERED

## 2019-12-09 PROCEDURE — 77030035236 HC SUT PDS STRATFX BARB J&J -B: Performed by: ORTHOPAEDIC SURGERY

## 2019-12-09 PROCEDURE — 82962 GLUCOSE BLOOD TEST: CPT

## 2019-12-09 PROCEDURE — 77030033138 HC SUT PGA STRATFX J&J -B: Performed by: ORTHOPAEDIC SURGERY

## 2019-12-09 PROCEDURE — 76210000017 HC OR PH I REC 1.5 TO 2 HR: Performed by: ORTHOPAEDIC SURGERY

## 2019-12-09 PROCEDURE — 74011250636 HC RX REV CODE- 250/636: Performed by: ANESTHESIOLOGY

## 2019-12-09 PROCEDURE — 77030020753 HC CUF TRNQT 1BLA STRY -B: Performed by: ORTHOPAEDIC SURGERY

## 2019-12-09 PROCEDURE — 77030034479 HC ADH SKN CLSR PRINEO J&J -B: Performed by: ORTHOPAEDIC SURGERY

## 2019-12-09 PROCEDURE — 77030008462 HC STPLR SKN PROX J&J -A: Performed by: ORTHOPAEDIC SURGERY

## 2019-12-09 PROCEDURE — 64450 NJX AA&/STRD OTHER PN/BRANCH: CPT | Performed by: ANESTHESIOLOGY

## 2019-12-09 PROCEDURE — C1776 JOINT DEVICE (IMPLANTABLE): HCPCS | Performed by: ORTHOPAEDIC SURGERY

## 2019-12-09 PROCEDURE — 77030008683 HC TU ET CUF COVD -A: Performed by: ANESTHESIOLOGY

## 2019-12-09 PROCEDURE — 77030031139 HC SUT VCRL2 J&J -A: Performed by: ORTHOPAEDIC SURGERY

## 2019-12-09 PROCEDURE — 74011636637 HC RX REV CODE- 636/637: Performed by: NURSE ANESTHETIST, CERTIFIED REGISTERED

## 2019-12-09 PROCEDURE — 77030018836 HC SOL IRR NACL ICUM -A: Performed by: ORTHOPAEDIC SURGERY

## 2019-12-09 PROCEDURE — 77030013079 HC BLNKT BAIR HGGR 3M -A: Performed by: ANESTHESIOLOGY

## 2019-12-09 DEVICE — CEMENT BONE 70GM FULL DOSE POLYMETHYLMETHACRYLATE W/O: Type: IMPLANTABLE DEVICE | Site: KNEE | Status: FUNCTIONAL

## 2019-12-09 DEVICE — IMPLANTABLE DEVICE
Type: IMPLANTABLE DEVICE | Site: KNEE | Status: FUNCTIONAL
Brand: TRULIANT

## 2019-12-09 DEVICE — IMPLANTABLE DEVICE
Type: IMPLANTABLE DEVICE | Site: KNEE | Status: FUNCTIONAL
Brand: OPTETRAK

## 2019-12-09 DEVICE — COMPONENT KNEE CEM POLYETH PREMIER: Type: IMPLANTABLE DEVICE | Status: FUNCTIONAL

## 2019-12-09 RX ORDER — FENTANYL CITRATE 50 UG/ML
100 INJECTION, SOLUTION INTRAMUSCULAR; INTRAVENOUS ONCE
Status: COMPLETED | OUTPATIENT
Start: 2019-12-09 | End: 2019-12-09

## 2019-12-09 RX ORDER — SUCCINYLCHOLINE CHLORIDE 100 MG/5ML
SYRINGE (ML) INTRAVENOUS AS NEEDED
Status: DISCONTINUED | OUTPATIENT
Start: 2019-12-09 | End: 2019-12-09 | Stop reason: HOSPADM

## 2019-12-09 RX ORDER — HYDROMORPHONE HYDROCHLORIDE 1 MG/ML
0.5 INJECTION, SOLUTION INTRAMUSCULAR; INTRAVENOUS; SUBCUTANEOUS
Status: DISCONTINUED | OUTPATIENT
Start: 2019-12-09 | End: 2019-12-09 | Stop reason: HOSPADM

## 2019-12-09 RX ORDER — SODIUM CHLORIDE, SODIUM LACTATE, POTASSIUM CHLORIDE, CALCIUM CHLORIDE 600; 310; 30; 20 MG/100ML; MG/100ML; MG/100ML; MG/100ML
125 INJECTION, SOLUTION INTRAVENOUS CONTINUOUS
Status: DISCONTINUED | OUTPATIENT
Start: 2019-12-09 | End: 2019-12-09 | Stop reason: HOSPADM

## 2019-12-09 RX ORDER — LATANOPROST 50 UG/ML
1 SOLUTION/ DROPS OPHTHALMIC
Status: DISCONTINUED | OUTPATIENT
Start: 2019-12-09 | End: 2019-12-12 | Stop reason: HOSPADM

## 2019-12-09 RX ORDER — LANOLIN ALCOHOL/MO/W.PET/CERES
325 CREAM (GRAM) TOPICAL 2 TIMES DAILY WITH MEALS
Status: DISCONTINUED | OUTPATIENT
Start: 2019-12-09 | End: 2019-12-12 | Stop reason: HOSPADM

## 2019-12-09 RX ORDER — MIDAZOLAM HYDROCHLORIDE 1 MG/ML
2 INJECTION, SOLUTION INTRAMUSCULAR; INTRAVENOUS ONCE
Status: COMPLETED | OUTPATIENT
Start: 2019-12-09 | End: 2019-12-09

## 2019-12-09 RX ORDER — CEFAZOLIN SODIUM 2 G/50ML
2 SOLUTION INTRAVENOUS EVERY 8 HOURS
Status: COMPLETED | OUTPATIENT
Start: 2019-12-09 | End: 2019-12-09

## 2019-12-09 RX ORDER — MORPHINE SULFATE 2 MG/ML
2 INJECTION, SOLUTION INTRAMUSCULAR; INTRAVENOUS
Status: DISCONTINUED | OUTPATIENT
Start: 2019-12-09 | End: 2019-12-12 | Stop reason: HOSPADM

## 2019-12-09 RX ORDER — FACIAL-BODY WIPES
10 EACH TOPICAL DAILY PRN
Status: DISCONTINUED | OUTPATIENT
Start: 2019-12-09 | End: 2019-12-12 | Stop reason: HOSPADM

## 2019-12-09 RX ORDER — FENTANYL CITRATE 50 UG/ML
INJECTION, SOLUTION INTRAMUSCULAR; INTRAVENOUS
Status: COMPLETED | OUTPATIENT
Start: 2019-12-09 | End: 2019-12-09

## 2019-12-09 RX ORDER — ONDANSETRON 2 MG/ML
4 INJECTION INTRAMUSCULAR; INTRAVENOUS
Status: DISCONTINUED | OUTPATIENT
Start: 2019-12-09 | End: 2019-12-12 | Stop reason: HOSPADM

## 2019-12-09 RX ORDER — ONDANSETRON 2 MG/ML
4 INJECTION INTRAMUSCULAR; INTRAVENOUS ONCE
Status: DISCONTINUED | OUTPATIENT
Start: 2019-12-09 | End: 2019-12-09 | Stop reason: HOSPADM

## 2019-12-09 RX ORDER — HYDROMORPHONE HYDROCHLORIDE 1 MG/ML
INJECTION, SOLUTION INTRAMUSCULAR; INTRAVENOUS; SUBCUTANEOUS AS NEEDED
Status: DISCONTINUED | OUTPATIENT
Start: 2019-12-09 | End: 2019-12-09 | Stop reason: HOSPADM

## 2019-12-09 RX ORDER — CEFAZOLIN SODIUM 2 G/50ML
2 SOLUTION INTRAVENOUS
Status: COMPLETED | OUTPATIENT
Start: 2019-12-09 | End: 2019-12-09

## 2019-12-09 RX ORDER — ONDANSETRON 2 MG/ML
INJECTION INTRAMUSCULAR; INTRAVENOUS AS NEEDED
Status: DISCONTINUED | OUTPATIENT
Start: 2019-12-09 | End: 2019-12-09 | Stop reason: HOSPADM

## 2019-12-09 RX ORDER — ENOXAPARIN SODIUM 100 MG/ML
30 INJECTION SUBCUTANEOUS EVERY 12 HOURS
Status: DISCONTINUED | OUTPATIENT
Start: 2019-12-09 | End: 2019-12-12 | Stop reason: HOSPADM

## 2019-12-09 RX ORDER — SODIUM CHLORIDE 0.9 % (FLUSH) 0.9 %
5-40 SYRINGE (ML) INJECTION AS NEEDED
Status: DISCONTINUED | OUTPATIENT
Start: 2019-12-09 | End: 2019-12-12 | Stop reason: HOSPADM

## 2019-12-09 RX ORDER — FENTANYL CITRATE 50 UG/ML
INJECTION, SOLUTION INTRAMUSCULAR; INTRAVENOUS AS NEEDED
Status: DISCONTINUED | OUTPATIENT
Start: 2019-12-09 | End: 2019-12-09 | Stop reason: HOSPADM

## 2019-12-09 RX ORDER — FAMOTIDINE 20 MG/1
20 TABLET, FILM COATED ORAL ONCE
Status: COMPLETED | OUTPATIENT
Start: 2019-12-09 | End: 2019-12-09

## 2019-12-09 RX ORDER — AMOXICILLIN 250 MG
1 CAPSULE ORAL 2 TIMES DAILY
Status: DISCONTINUED | OUTPATIENT
Start: 2019-12-09 | End: 2019-12-12 | Stop reason: HOSPADM

## 2019-12-09 RX ORDER — MAGNESIUM SULFATE 100 %
4 CRYSTALS MISCELLANEOUS AS NEEDED
Status: DISCONTINUED | OUTPATIENT
Start: 2019-12-09 | End: 2019-12-09 | Stop reason: HOSPADM

## 2019-12-09 RX ORDER — NALOXONE HYDROCHLORIDE 0.4 MG/ML
0.4 INJECTION, SOLUTION INTRAMUSCULAR; INTRAVENOUS; SUBCUTANEOUS AS NEEDED
Status: DISCONTINUED | OUTPATIENT
Start: 2019-12-09 | End: 2019-12-12 | Stop reason: HOSPADM

## 2019-12-09 RX ORDER — MELATONIN
1000 DAILY
Status: DISCONTINUED | OUTPATIENT
Start: 2019-12-10 | End: 2019-12-12 | Stop reason: HOSPADM

## 2019-12-09 RX ORDER — LOSARTAN POTASSIUM 50 MG/1
50 TABLET ORAL DAILY
Status: DISCONTINUED | OUTPATIENT
Start: 2019-12-10 | End: 2019-12-12 | Stop reason: HOSPADM

## 2019-12-09 RX ORDER — HYDROCODONE BITARTRATE AND ACETAMINOPHEN 10; 325 MG/1; MG/1
1 TABLET ORAL
Status: DISCONTINUED | OUTPATIENT
Start: 2019-12-09 | End: 2019-12-12 | Stop reason: HOSPADM

## 2019-12-09 RX ORDER — OXYCODONE AND ACETAMINOPHEN 5; 325 MG/1; MG/1
1 TABLET ORAL
Status: DISCONTINUED | OUTPATIENT
Start: 2019-12-09 | End: 2019-12-12 | Stop reason: HOSPADM

## 2019-12-09 RX ORDER — INSULIN LISPRO 100 [IU]/ML
INJECTION, SOLUTION INTRAVENOUS; SUBCUTANEOUS ONCE
Status: COMPLETED | OUTPATIENT
Start: 2019-12-09 | End: 2019-12-09

## 2019-12-09 RX ORDER — LIDOCAINE HYDROCHLORIDE 10 MG/ML
0.1 INJECTION, SOLUTION EPIDURAL; INFILTRATION; INTRACAUDAL; PERINEURAL AS NEEDED
Status: DISCONTINUED | OUTPATIENT
Start: 2019-12-09 | End: 2019-12-09 | Stop reason: HOSPADM

## 2019-12-09 RX ORDER — ROPIVACAINE HYDROCHLORIDE 5 MG/ML
INJECTION, SOLUTION EPIDURAL; INFILTRATION; PERINEURAL
Status: COMPLETED | OUTPATIENT
Start: 2019-12-09 | End: 2019-12-09

## 2019-12-09 RX ORDER — HYDROCHLOROTHIAZIDE 25 MG/1
12.5 TABLET ORAL DAILY
Status: DISCONTINUED | OUTPATIENT
Start: 2019-12-10 | End: 2019-12-12 | Stop reason: HOSPADM

## 2019-12-09 RX ORDER — PROPOFOL 10 MG/ML
INJECTION, EMULSION INTRAVENOUS AS NEEDED
Status: DISCONTINUED | OUTPATIENT
Start: 2019-12-09 | End: 2019-12-09 | Stop reason: HOSPADM

## 2019-12-09 RX ORDER — SODIUM CHLORIDE 0.9 % (FLUSH) 0.9 %
5-40 SYRINGE (ML) INJECTION EVERY 8 HOURS
Status: DISCONTINUED | OUTPATIENT
Start: 2019-12-09 | End: 2019-12-12 | Stop reason: HOSPADM

## 2019-12-09 RX ORDER — PROMETHAZINE HYDROCHLORIDE 25 MG/ML
12.5 INJECTION, SOLUTION INTRAMUSCULAR; INTRAVENOUS ONCE
Status: DISCONTINUED | OUTPATIENT
Start: 2019-12-09 | End: 2019-12-09 | Stop reason: HOSPADM

## 2019-12-09 RX ORDER — ASCORBIC ACID 250 MG
500 TABLET ORAL DAILY
Status: DISCONTINUED | OUTPATIENT
Start: 2019-12-10 | End: 2019-12-12 | Stop reason: HOSPADM

## 2019-12-09 RX ORDER — DEXAMETHASONE SODIUM PHOSPHATE 4 MG/ML
INJECTION, SOLUTION INTRA-ARTICULAR; INTRALESIONAL; INTRAMUSCULAR; INTRAVENOUS; SOFT TISSUE AS NEEDED
Status: DISCONTINUED | OUTPATIENT
Start: 2019-12-09 | End: 2019-12-09 | Stop reason: HOSPADM

## 2019-12-09 RX ORDER — LIDOCAINE HYDROCHLORIDE 20 MG/ML
INJECTION, SOLUTION EPIDURAL; INFILTRATION; INTRACAUDAL; PERINEURAL AS NEEDED
Status: DISCONTINUED | OUTPATIENT
Start: 2019-12-09 | End: 2019-12-09 | Stop reason: HOSPADM

## 2019-12-09 RX ORDER — SODIUM CHLORIDE, SODIUM LACTATE, POTASSIUM CHLORIDE, CALCIUM CHLORIDE 600; 310; 30; 20 MG/100ML; MG/100ML; MG/100ML; MG/100ML
50 INJECTION, SOLUTION INTRAVENOUS CONTINUOUS
Status: DISPENSED | OUTPATIENT
Start: 2019-12-10 | End: 2019-12-10

## 2019-12-09 RX ORDER — CALCIUM CARBONATE/VITAMIN D3 600MG-5MCG
1 TABLET ORAL
Status: DISCONTINUED | OUTPATIENT
Start: 2019-12-09 | End: 2019-12-12 | Stop reason: HOSPADM

## 2019-12-09 RX ORDER — POLYETHYLENE GLYCOL 3350 17 G/17G
17 POWDER, FOR SOLUTION ORAL DAILY
Status: DISCONTINUED | OUTPATIENT
Start: 2019-12-10 | End: 2019-12-12 | Stop reason: HOSPADM

## 2019-12-09 RX ORDER — MIDAZOLAM HYDROCHLORIDE 1 MG/ML
INJECTION, SOLUTION INTRAMUSCULAR; INTRAVENOUS
Status: COMPLETED | OUTPATIENT
Start: 2019-12-09 | End: 2019-12-09

## 2019-12-09 RX ORDER — FENTANYL CITRATE 50 UG/ML
50 INJECTION, SOLUTION INTRAMUSCULAR; INTRAVENOUS AS NEEDED
Status: DISCONTINUED | OUTPATIENT
Start: 2019-12-09 | End: 2019-12-09 | Stop reason: HOSPADM

## 2019-12-09 RX ADMIN — FENTANYL CITRATE 50 MCG: 50 INJECTION, SOLUTION INTRAMUSCULAR; INTRAVENOUS at 07:24

## 2019-12-09 RX ADMIN — HYDROCODONE BITARTRATE AND ACETAMINOPHEN 1 TABLET: 10; 325 TABLET ORAL at 19:01

## 2019-12-09 RX ADMIN — PROPOFOL 150 MG: 10 INJECTION, EMULSION INTRAVENOUS at 07:42

## 2019-12-09 RX ADMIN — FENTANYL CITRATE 100 MCG: 50 INJECTION, SOLUTION INTRAMUSCULAR; INTRAVENOUS at 07:42

## 2019-12-09 RX ADMIN — SENNOSIDES AND DOCUSATE SODIUM 1 TABLET: 8.6; 5 TABLET ORAL at 12:44

## 2019-12-09 RX ADMIN — SODIUM CHLORIDE, SODIUM LACTATE, POTASSIUM CHLORIDE, AND CALCIUM CHLORIDE 50 ML/HR: 600; 310; 30; 20 INJECTION, SOLUTION INTRAVENOUS at 12:45

## 2019-12-09 RX ADMIN — LIDOCAINE HYDROCHLORIDE 50 MG: 20 INJECTION, SOLUTION INTRAVENOUS at 07:42

## 2019-12-09 RX ADMIN — CEFAZOLIN 2 G: 10 INJECTION, POWDER, FOR SOLUTION INTRAVENOUS at 12:44

## 2019-12-09 RX ADMIN — DEXAMETHASONE SODIUM PHOSPHATE 8 MG: 4 INJECTION, SOLUTION INTRA-ARTICULAR; INTRALESIONAL; INTRAMUSCULAR; INTRAVENOUS; SOFT TISSUE at 07:55

## 2019-12-09 RX ADMIN — MIDAZOLAM 2 MG: 1 INJECTION INTRAMUSCULAR; INTRAVENOUS at 07:24

## 2019-12-09 RX ADMIN — ENOXAPARIN SODIUM 30 MG: 30 INJECTION, SOLUTION INTRAVENOUS; SUBCUTANEOUS at 22:40

## 2019-12-09 RX ADMIN — HYDROMORPHONE HYDROCHLORIDE 0.5 MG: 1 INJECTION, SOLUTION INTRAMUSCULAR; INTRAVENOUS; SUBCUTANEOUS at 11:40

## 2019-12-09 RX ADMIN — MIDAZOLAM 2 MG: 1 INJECTION INTRAMUSCULAR; INTRAVENOUS at 07:22

## 2019-12-09 RX ADMIN — CEFAZOLIN SODIUM 2 G: 2 SOLUTION INTRAVENOUS at 07:40

## 2019-12-09 RX ADMIN — ROPIVACAINE HYDROCHLORIDE 30 ML: 5 INJECTION, SOLUTION EPIDURAL; INFILTRATION; PERINEURAL at 07:24

## 2019-12-09 RX ADMIN — FAMOTIDINE 20 MG: 20 TABLET, FILM COATED ORAL at 06:43

## 2019-12-09 RX ADMIN — SODIUM CHLORIDE, SODIUM LACTATE, POTASSIUM CHLORIDE, AND CALCIUM CHLORIDE 50 ML/HR: 600; 310; 30; 20 INJECTION, SOLUTION INTRAVENOUS at 07:03

## 2019-12-09 RX ADMIN — FENTANYL CITRATE 100 MCG: 50 INJECTION, SOLUTION INTRAMUSCULAR; INTRAVENOUS at 07:23

## 2019-12-09 RX ADMIN — HYDROCODONE BITARTRATE AND ACETAMINOPHEN 1 TABLET: 10; 325 TABLET ORAL at 12:44

## 2019-12-09 RX ADMIN — INSULIN LISPRO 3 UNITS: 100 INJECTION, SOLUTION INTRAVENOUS; SUBCUTANEOUS at 10:53

## 2019-12-09 RX ADMIN — ONDANSETRON 4 MG: 2 SOLUTION INTRAMUSCULAR; INTRAVENOUS at 07:55

## 2019-12-09 RX ADMIN — SENNOSIDES AND DOCUSATE SODIUM 1 TABLET: 8.6; 5 TABLET ORAL at 19:01

## 2019-12-09 RX ADMIN — HYDROMORPHONE HYDROCHLORIDE 1 MG: 1 INJECTION, SOLUTION INTRAMUSCULAR; INTRAVENOUS; SUBCUTANEOUS at 08:52

## 2019-12-09 RX ADMIN — Medication 100 MG: at 07:42

## 2019-12-09 RX ADMIN — CEFAZOLIN 2 G: 10 INJECTION, POWDER, FOR SOLUTION INTRAVENOUS at 20:47

## 2019-12-09 RX ADMIN — FERROUS SULFATE TAB 325 MG (65 MG ELEMENTAL FE) 325 MG: 325 (65 FE) TAB at 19:01

## 2019-12-09 NOTE — H&P
Surgery History and Physical    Subjective:      Olvin Benjamin is a 68 y.o.  female who presents with Left knee DJD. 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     Hypertension     Morbid obesity ([address]     Sleep apnea     CPAP machine    Use of cane as ambulatory aid       Past Surgical History:   Procedure Laterality Date    HX CATARACT REMOVAL Bilateral     HX  SECTION          HX COLONOSCOPY      HX HYSTERECTOMY      HX KNEE ARTHROSCOPY Right 06/10/2019    right total knee arthroplasty    HX NEPHRECTOMY Right     HX ROTATOR CUFF REPAIR Right     shoulder surgery      Social History     Tobacco Use    Smoking status: Never Smoker    Smokeless tobacco: Never Used   Substance Use Topics    Alcohol use: No      Family History   Problem Relation Age of Onset    Diabetes Mother     Diabetes Sister       Prior to Admission medications    Medication Sig Start Date End Date Taking? Authorizing Provider   ferrous sulfate 325 mg (65 mg iron) tablet Take 1 Tab by mouth two (2) times daily (with meals). 19  Yes Yunior CONCEPCION NP   ascorbic acid, vitamin C, (VITAMIN C) 500 mg tablet Take 1 Tab by mouth daily. 19  Yes Yunior CONCEPCION NP   losartan-hydroCHLOROthiazide (HYZAAR) 50-12.5 mg per tablet Take 1 Tab by mouth daily.  3/5/19  Yes Provider, Historical   latanoprost (XALATAN) 0.005 % ophthalmic solution Administer 1 Drop to right eye nightly. Yes Provider, Historical   cholecalciferol (VITAMIN D3) 1,000 unit tablet 1,000 Units. Yes Provider, Historical   calcium-cholecalciferol, d3, (CALCIUM 600 + D) 600-125 mg-unit tab Take  by mouth daily. Yes Provider, Historical   polyethylene glycol (MIRALAX) 17 gram packet Take 1 Packet by mouth daily. Patient taking differently: Take 17 g by mouth daily as needed. 19   Mayank CONCEPCION NP   diclofenac (VOLTAREN) 1 % gel Apply 2 g to affected area as needed. Indications: OSTEOARTHRITIS    Provider, Historical     No Known Allergies      Review of Systems:    A comprehensive review of systems was negative except for that written in the History of Present Illness.     Objective:     Patient Vitals for the past 8 hrs:   BP Temp Pulse Resp SpO2 Weight   19 0733 115/65 -- (!) 57 16 100 % --   19 0728 -- -- (!) 54 -- 100 % --   19 0722 -- -- 63 16 100 % --   19 0629 136/85 97.1 °F (36.2 °C) 70 16 98 % 90.7 kg (200 lb)       Temp (24hrs), Av.1 °F (36.2 °C), Min:97.1 °F (36.2 °C), Max:97.1 °F (36.2 °C)      Physical Exam:  GENERAL: alert, cooperative, no distress, appears stated age, LUNG: clear to auscultation bilaterally, HEART: regular rate and rhythm, S1, S2 normal, no murmur, click, rub or gallop, EXTREMITIES:  extremities normal, atraumatic, no cyanosis or edema    Labs:   Recent Results (from the past 24 hour(s))   GLUCOSE, POC    Collection Time: 19  6:58 AM   Result Value Ref Range    Glucose (POC) 101 70 - 110 mg/dL       Data Review:    CBC:   Lab Results   Component Value Date/Time    WBC 10.7 2019 03:50 AM    RBC 3.21 (L) 2019 03:50 AM    HGB 8.7 (L) 2019 03:50 AM    HCT 28.2 (L) 2019 03:50 AM    PLATELET 107 (H)  03:50 AM      BMP:   Lab Results   Component Value Date/Time    Glucose 95 2019 03:50 AM    Sodium 138 2019 03:50 AM    Potassium 4.2 06/24/2019 03:50 AM    Chloride 103 06/24/2019 03:50 AM    CO2 31 06/24/2019 03:50 AM    BUN 21 (H) 06/24/2019 03:50 AM    Creatinine 1.07 06/24/2019 03:50 AM    Calcium 9.0 06/24/2019 03:50 AM     per anesthesia    Assessment:     LEft knee DJD    Plan:     LEft TKA  Admit s/p surgery

## 2019-12-09 NOTE — ANESTHESIA POSTPROCEDURE EVALUATION
Procedure(s):  Left total knee arthroplasty with PRP  femoral.    general    Anesthesia Post Evaluation      Multimodal analgesia: multimodal analgesia used between 6 hours prior to anesthesia start to PACU discharge  Patient location during evaluation: bedside  Patient participation: complete - patient participated  Level of consciousness: awake  Pain score: 0  Pain management: adequate  Airway patency: patent  Anesthetic complications: no  Cardiovascular status: stable  Respiratory status: acceptable  Hydration status: acceptable  Post anesthesia nausea and vomiting:  none      Vitals Value Taken Time   /70 12/9/2019 12:24 PM   Temp 36.9 °C (98.5 °F) 12/9/2019 10:42 AM   Pulse 81 12/9/2019 12:25 PM   Resp 16 12/9/2019 12:25 PM   SpO2 98 % 12/9/2019 12:25 PM   Vitals shown include unvalidated device data.

## 2019-12-09 NOTE — PERIOP NOTES
Pre-Op Summary    Pt arrived via car with family/friend and is oriented to time, place, person and situation. Patient with unsteady gait with cane assistive devices. Visit Vitals  /85 (BP 1 Location: Right arm, BP Patient Position: At rest)   Pulse 70   Temp 97.1 °F (36.2 °C)   Resp 16   Ht 4' 9\" (1.448 m)   Wt 90.7 kg (200 lb)   SpO2 98%   BMI 43.28 kg/m²       Peripheral IV located on Left antecubital .    Patients belongings are located with daughter. Patient's point of contact is Yasmeen Walker, marilu and their contact number is: [phone] They will be in the waiting room. They are able to receive medication information. They will be admitted.

## 2019-12-09 NOTE — PROGRESS NOTES
Problem: Falls - Risk of  Goal: *Absence of Falls  Description  Document Radha Mae Fall Risk and appropriate interventions in the flowsheet. Outcome: Progressing Towards Goal  Note: Fall Risk Interventions:  Mobility Interventions: Patient to call before getting OOB         Medication Interventions: Patient to call before getting OOB    Elimination Interventions: Call light in reach, Patient to call for help with toileting needs              Problem: Pressure Injury - Risk of  Goal: *Prevention of pressure injury  Description  Document Juno Scale and appropriate interventions in the flowsheet.   Outcome: Progressing Towards Goal  Note: Pressure Injury Interventions:  Sensory Interventions: Keep linens dry and wrinkle-free    Moisture Interventions: Absorbent underpads, Minimize layers    Activity Interventions: PT/OT evaluation    Mobility Interventions: Pressure redistribution bed/mattress (bed type)    Nutrition Interventions: Document food/fluid/supplement intake    Friction and Shear Interventions: Minimize layers                Problem: Patient Education: Go to Patient Education Activity  Goal: Patient/Family Education  Outcome: Progressing Towards Goal

## 2019-12-09 NOTE — BRIEF OP NOTE
BRIEF OPERATIVE NOTE    Date of Procedure: 12/9/2019   Preoperative Diagnosis: left knee osteoarthritis  m17.12  Postoperative Diagnosis: left knee osteoarthritis  m17.12    Procedure(s):  Left total knee arthroplasty with PRP    Surgeon(s) and Role:     Tomy Fitzpatrick MD - Primary         Surgical Assistant: Rangel Burnett    Surgical Staff:  Circ-1: Magdaleno Bose RN  Scrub Tech-1: Cheyenne Jung  Surg Asst-1: Eduardo Pepe  Event Time In Time Out   Incision Start 3563    Incision Close 1010      Anesthesia: General and block  Estimated Blood Loss: 150cc  Specimens: bone  Findings: left knee djd [id number]  Complications: none  Implants:   Implant Name Type Inv.  Item Serial No.  Lot No. LRB No. Used Action   CEMENT BNE FAST LV CEMEX 70GM --  - [id number]  CEMENT BNE FAST LV CEMEX 70GM --   [employer] [id number] Left 1 Implanted   INSERT 3-PEG PAT 29MM --  - [id number]  INSERT 3-PEG PAT 29MM --  [id number] [employer]  Left 1 Implanted   truliant fit tibial tray   [id number] [employer]  Left 1 Implanted   FEM PS KEITH LT SZ 4 -- TRULIANT - [id number]  FEM PS KEITH LT SZ 4 -- TRULIANT [id number] [employer]  Left 1 Implanted   truliant tubial insert   [id number] [employer]  Left 1 Implanted

## 2019-12-09 NOTE — PROGRESS NOTES
conducted a pre-surgery visit with Ty Pablo, who is a 68 y.o.,female. The  provided the following Interventions:  Initiated a relationship of care and support. Offered prayer and assurance of continued prayers on patient's behalf. Plan:  Chaplains will continue to follow and will provide pastoral care on an as needed/requested basis.  recommends bedside caregivers page  on duty if patient shows signs of acute spiritual or emotional distress.     Rani Smith   Spiritual Care   [phone]

## 2019-12-09 NOTE — PERIOP NOTES
80 - Notified surgeon in operating room - no ADT ordered. 1126 -Pt had a bout of emesis. Denies nausea at this time and would not like the phenergan. Will continue to monitor. Verified and updated daughter on cell phone - no one answered in waiting room. 1225 - TRANSFER - OUT REPORT:    Verbal report given to Brent Jackson RN on Guero Marks  being transferred to [address] [place] [address] for routine post - op       Report consisted of patients Situation, Background, Assessment and   Recommendations(SBAR). Information from the following report(s) ED Summary, Intake/Output and MAR was reviewed with the receiving nurse. Lines:   Peripheral IV 12/09/19 Left Antecubital (Active)   Site Assessment Clean, dry, & intact 12/9/2019 10:47 AM   Phlebitis Assessment 0 12/9/2019 10:47 AM   Infiltration Assessment 0 12/9/2019 10:47 AM   Dressing Status Clean, dry, & intact 12/9/2019 10:47 AM   Dressing Type Transparent;Tape 12/9/2019 10:47 AM   Hub Color/Line Status Infusing;Pink 12/9/2019 10:47 AM   Alcohol Cap Used Yes 12/9/2019  7:02 AM        Opportunity for questions and clarification was provided.       Patient transported with:   [employer]

## 2019-12-09 NOTE — ANESTHESIA PROCEDURE NOTES
Peripheral Block    Start time: 12/9/2019 7:20 AM  End time: 12/9/2019 7:27 AM  Performed by: Morgan Snyder MD  Authorized by: Morgan Snyder MD       Pre-procedure:    Indications: at surgeon's request, post-op pain management and procedure for pain    Preanesthetic Checklist: patient identified, risks and benefits discussed, site marked, timeout performed, anesthesia consent given and patient being monitored      Block Type:   Block Type:  Femoral single shot  Laterality:  Left  Monitoring:  Continuous pulse ox, frequent vital sign checks, oxygen, heart rate and responsive to questions  Injection Technique:  Single shot  Procedures: ultrasound guided and nerve stimulator    Patient Position: supine  Prep: chlorhexidine    Location:  Upper thigh  Needle Type:  Stimuplex  Needle Gauge:  21 G  Needle Localization:  Ultrasound guidance and nerve stimulator    Assessment:  Number of attempts:  1  Injection Assessment:  No paresthesia, incremental injection every 5 mL, ultrasound image on chart, no intravascular symptoms, negative aspiration for blood and local visualized surrounding nerve on ultrasound  Patient tolerance:  Patient tolerated the procedure well with no immediate complications  Location:  PREOP HOLDING      12/9/2019     9:38 AM     Mark Peck MD

## 2019-12-09 NOTE — ANESTHESIA PREPROCEDURE EVALUATION
Relevant Problems   No relevant active problems       Anesthetic History   No history of anesthetic complications            Review of Systems / Medical History  Patient summary reviewed and pertinent labs reviewed    Pulmonary        Sleep apnea: No treatment           Neuro/Psych   Within defined limits           Cardiovascular    Hypertension              Exercise tolerance: >4 METS     GI/Hepatic/Renal  Within defined limits              Endo/Other    Diabetes: type 2    Morbid obesity and arthritis     Other Findings              Physical Exam    Airway  Mallampati: II  TM Distance: 4 - 6 cm  Neck ROM: normal range of motion   Mouth opening: Normal     Cardiovascular  Regular rate and rhythm,  S1 and S2 normal,  no murmur, click, rub, or gallop  Rhythm: regular  Rate: normal         Dental    Dentition: Lower dentition intact, Upper dentition intact and Caps/crowns  Comments: Chipped L upper incisor   Pulmonary  Breath sounds clear to auscultation               Abdominal  GI exam deferred       Other Findings            Anesthetic Plan    ASA: 3  Anesthesia type: general          Induction: Intravenous  Anesthetic plan and risks discussed with: Patient

## 2019-12-10 LAB
ANION GAP SERPL CALC-SCNC: 3 MMOL/L (ref 3–18)
BUN SERPL-MCNC: 21 MG/DL (ref 7–18)
BUN/CREAT SERPL: 17 (ref 12–20)
CALCIUM SERPL-MCNC: 9.1 MG/DL (ref 8.5–10.1)
CHLORIDE SERPL-SCNC: 108 MMOL/L (ref 100–111)
CO2 SERPL-SCNC: 29 MMOL/L (ref 21–32)
CREAT SERPL-MCNC: 1.23 MG/DL (ref 0.6–1.3)
GLUCOSE SERPL-MCNC: 118 MG/DL (ref 74–99)
HCT VFR BLD AUTO: 32 % (ref 35–45)
HGB BLD-MCNC: 10 G/DL (ref 12–16)
INR PPP: 0.9 (ref 0.8–1.2)
PLATELET # BLD AUTO: 189 K/UL (ref 135–420)
POTASSIUM SERPL-SCNC: 4.8 MMOL/L (ref 3.5–5.5)
PROTHROMBIN TIME: 12.2 SEC (ref 11.5–15.2)
SODIUM SERPL-SCNC: 140 MMOL/L (ref 136–145)

## 2019-12-10 PROCEDURE — 36415 COLL VENOUS BLD VENIPUNCTURE: CPT

## 2019-12-10 PROCEDURE — 74011250637 HC RX REV CODE- 250/637: Performed by: ORTHOPAEDIC SURGERY

## 2019-12-10 PROCEDURE — 74011250637 HC RX REV CODE- 250/637: Performed by: HOSPITALIST

## 2019-12-10 PROCEDURE — 74011250636 HC RX REV CODE- 250/636: Performed by: ORTHOPAEDIC SURGERY

## 2019-12-10 PROCEDURE — 97530 THERAPEUTIC ACTIVITIES: CPT

## 2019-12-10 PROCEDURE — 85018 HEMOGLOBIN: CPT

## 2019-12-10 PROCEDURE — 74011000250 HC RX REV CODE- 250: Performed by: ORTHOPAEDIC SURGERY

## 2019-12-10 PROCEDURE — 97162 PT EVAL MOD COMPLEX 30 MIN: CPT

## 2019-12-10 PROCEDURE — 85610 PROTHROMBIN TIME: CPT

## 2019-12-10 PROCEDURE — 80048 BASIC METABOLIC PNL TOTAL CA: CPT

## 2019-12-10 PROCEDURE — 65270000029 HC RM PRIVATE

## 2019-12-10 PROCEDURE — 85049 AUTOMATED PLATELET COUNT: CPT

## 2019-12-10 RX ADMIN — OXYCODONE HYDROCHLORIDE AND ACETAMINOPHEN 1 TABLET: 5; 325 TABLET ORAL at 07:28

## 2019-12-10 RX ADMIN — LATANOPROST 1 DROP: 50 SOLUTION OPHTHALMIC at 22:00

## 2019-12-10 RX ADMIN — LOSARTAN POTASSIUM 50 MG: 50 TABLET ORAL at 09:00

## 2019-12-10 RX ADMIN — SODIUM CHLORIDE 10 ML: 9 INJECTION, SOLUTION INTRAMUSCULAR; INTRAVENOUS; SUBCUTANEOUS at 22:00

## 2019-12-10 RX ADMIN — FERROUS SULFATE TAB 325 MG (65 MG ELEMENTAL FE) 325 MG: 325 (65 FE) TAB at 09:15

## 2019-12-10 RX ADMIN — POLYETHYLENE GLYCOL 3350 17 G: 17 POWDER, FOR SOLUTION ORAL at 09:14

## 2019-12-10 RX ADMIN — Medication 1 TABLET: at 09:14

## 2019-12-10 RX ADMIN — ENOXAPARIN SODIUM 30 MG: 30 INJECTION, SOLUTION INTRAVENOUS; SUBCUTANEOUS at 11:12

## 2019-12-10 RX ADMIN — OXYCODONE HYDROCHLORIDE AND ACETAMINOPHEN 1 TABLET: 5; 325 TABLET ORAL at 17:55

## 2019-12-10 RX ADMIN — Medication 10 ML: at 22:03

## 2019-12-10 RX ADMIN — ENOXAPARIN SODIUM 30 MG: 30 INJECTION, SOLUTION INTRAVENOUS; SUBCUTANEOUS at 22:01

## 2019-12-10 RX ADMIN — SODIUM CHLORIDE 10 ML: 9 INJECTION, SOLUTION INTRAMUSCULAR; INTRAVENOUS; SUBCUTANEOUS at 17:56

## 2019-12-10 RX ADMIN — SENNOSIDES AND DOCUSATE SODIUM 1 TABLET: 8.6; 5 TABLET ORAL at 09:15

## 2019-12-10 RX ADMIN — FERROUS SULFATE TAB 325 MG (65 MG ELEMENTAL FE) 325 MG: 325 (65 FE) TAB at 17:55

## 2019-12-10 RX ADMIN — HYDROCODONE BITARTRATE AND ACETAMINOPHEN 1 TABLET: 10; 325 TABLET ORAL at 00:46

## 2019-12-10 RX ADMIN — SODIUM CHLORIDE, SODIUM LACTATE, POTASSIUM CHLORIDE, AND CALCIUM CHLORIDE 50 ML/HR: 600; 310; 30; 20 INJECTION, SOLUTION INTRAVENOUS at 18:50

## 2019-12-10 RX ADMIN — HYDROCHLOROTHIAZIDE 12.5 MG: 25 TABLET ORAL at 09:16

## 2019-12-10 RX ADMIN — Medication 500 MG: at 09:15

## 2019-12-10 RX ADMIN — MELATONIN 1 TABLET: at 09:15

## 2019-12-10 RX ADMIN — OXYCODONE HYDROCHLORIDE AND ACETAMINOPHEN 1 TABLET: 5; 325 TABLET ORAL at 12:17

## 2019-12-10 RX ADMIN — OXYCODONE HYDROCHLORIDE AND ACETAMINOPHEN 1 TABLET: 5; 325 TABLET ORAL at 22:01

## 2019-12-10 RX ADMIN — SENNOSIDES AND DOCUSATE SODIUM 1 TABLET: 8.6; 5 TABLET ORAL at 17:55

## 2019-12-10 NOTE — PROGRESS NOTES
Progress Note      Post Operative Day: 1    Assessment:    1. Status post LEFT  TOTAL KNEE ARTHROPLASTY [12508] (KNEE ARTHROPLASTY TOTAL) for Left knee DJD [M17.12] 12/9/2019,  Progressing. PLAN:    1. Mobilize. Continue P.T.  2. 50% to full in knee immobilizer  3. Lovenox for DVT prophylaxis   4. Discharge Planning. HPI: Elroy Beth is a 68 y.o. female patient without new orthopaedic changes. Blood pressure 126/75, pulse 72, temperature 98.8 °F (37.1 °C), resp. rate 16, height 4' 9\" (1.448 m), weight 90.7 kg (200 lb), SpO2 98 %. CBC w/Diff   Lab Results   Component Value Date/Time    WBC 10.7 06/24/2019 03:50 AM    RBC 3.21 (L) 06/24/2019 03:50 AM    HGB 10.0 (L) 12/10/2019 05:42 AM    HCT 32.0 (L) 12/10/2019 05:42 AM    MCV 87.9 06/24/2019 03:50 AM    MCH 27.1 06/24/2019 03:50 AM    MCHC 30.9 (L) 06/24/2019 03:50 AM    RDW 18.2 (H) 06/24/2019 03:50 AM    Lab Results   Component Value Date/Time    MONOS 11 (H) 06/24/2019 03:50 AM    EOS 2 06/24/2019 03:50 AM    BASOS 0 06/24/2019 03:50 AM    RDW 18.2 (H) 06/24/2019 03:50 AM             Physical Assessment:  General: in no apparent distress, well developed and well nourished, non-toxic, in no respiratory distress and acyanotic, alert and oriented times 3   Wound: no drainage, covered with sterile dressing   Extremities:  Neurovascular intact LLE. Compartments soft and NT distal to surgical  site. Able to contract quadriceps. Plantar and dorsiflexion intact. Palpable DP/PT pulses noted. Denies paresthesias    Dressing:  CDI   DVT Exam:   No exam evidence to suggest DVT. Compartments soft and NT.           Radiology: no new ortho studies          - Jeremias Moreno, [address] [place] [address], [facility]  12/10/2019    Office [phone]

## 2019-12-10 NOTE — OP NOTES
[facility]  OPERATIVE REPORT    Name:  John Zazueta  MR#:   [id number]  :  1945  ACCOUNT #:  [de-identified]  DATE OF SERVICE:  2019    PREOPERATIVE DIAGNOSIS:  Left knee degenerative disk disease, severe varus deformity. POSTOPERATIVE DIAGNOSIS:  Left knee degenerative disk disease, severe varus deformity. PROCEDURE PERFORMED:  Left total knee arthroplasty with platelet-rich plasma. SURGEON:  Luis Carlos MD    ASSISTANT:  Selena Cabrales, assisted with surgery and closure. INCISION START:  [id number]. INCISION CLOSED:  1010. ANESTHESIA:  General with block. COMPLICATIONS:  None. SPECIMENS REMOVED:  None. IMPLANTS:  Exactech Truliant system, posterior stabilized femur size 4, size 3 tibia, 11 mm poly, posterior stabilized 29-mm 2-peg patella. ESTIMATED BLOOD LOSS:  150 mL. INDICATIONS FOR PROCEDURE:  The patient is a pleasant 71-year-old female with a history of left knee DJD. She was found to have severe deformity. She was brought to the operative suite for definitive management. Before informed consent was obtained, risks and benefits were explained in full including but not limited to bleeding, infection, neurovascular damage, pain, stiffness, swelling, further surgery, revision surgery, incomplete resolution of symptoms, medical complications. PROCEDURE:  The patient was brought to the operative suite, placed in the supine position. She was intubated with general endotracheal anesthesia. She was given a peripheral leg block. We proceeded to secure left leg. The tourniquet was proximally placed prior to cementation for total of 35 minutes. After this was done, we proceeded to prep and drape the left leg in a normal sterile fashion. She was found to be normally obese. Range of motion was limited, 0 to approximately 70 degrees grossly secondary to deformity with varus malalignment.     After this was done, we proceeded to make a standard midline incision, dissected down through the skin. Bleeders were cauterized. Parapatellar fat was elevated. Median parapatellar incision was made. Inferior and superior fat pad were excised. ACL and PCL were excised. Medial and lateral meniscus were excised. A severe deformity in the medial aspect of the knee was appreciated. After this was done, we proceed to drill and place the IM guidewire within the femur. It was found to be in the central portion of the femur. This was placed at 6 degrees of valgus. This was secured in position. Distal cut was made. This was found to be in adequate position grossly. We then proceeded utilizing the distal sizing guide. Initially, we sized to a 3.5; however, there was concerns for the deformity and such 3.5 did not extend beyond approximately 17 to 19 mm poly and with that in mind, we proceeded to utilizing a 4 which was adequate. There was concern for notching, so we did anteriorize this somewhat. After the standard 4-in-1 block was secured, the anterior, posterior and chamfer cuts were made. There was found to be notch in the anterior femur with some slight hyperflexion and subluxation of the femur. Femoral cyst was appreciated proximally. Secondary to this, we utilized the bone graft at the end of the case and bone graft was fixed proximally at the area of the notching. Once this was done, we proceeded to turn our attention to the tibia. The IM guidewire was placed after drilling in the tibia. This was aligned medial middle-third to the tibial tubercle. This was cut in a standard fashion after securing the tibial cutting block. This was sized to size 3. We then proceeded to adjust the gap spacing. This was found to be 11 to 12 mm. Once this was done, we proceeded cauterizing the four bleeders. Tourniquet was placed at this point with a spacer.   We proceeded to then turn our attention back to tibia, the tibia was sponged, then trial tibia was placed. After this was done, we turned our attention back to the femur. The trial femur was placed. The proximal femur does not impinge with the notching proximally and actually covered up the area of the cyst formation. Once this was done, we proceeded to ream the notch. The notch construct was secured. We then turned our attention to the patella, patella was secured with patellar clips. A 3-in-1 lollipop was utilized. The 3 drill holes were placed with the lollipop. A trial 29-mm 3-peg patella was placed. We then proceeded to trial with a 11, 12, and 13 mm poly. The 13 was noted to be notably tight, particularly laterally. The popliteus was released and was found to be somewhat tight. We then proceeded to drop down to 12 and 11 size, size 11 was found to have adequate range of motion 0 to 110 degrees with limitations secondary to posterior fat in the popliteal aspect of her knee. Extension was excellent, stability was excellent throughout the range of motion. Once this was done, we proceeded to remove all the trial components. The femur was copiously irrigated. Cement was mixed on the back table. Final components were decided upon. We treated them extensively. Bone graft consisted proximal to the femur at the area of the notching. This was found to be essentially packed, full with bone graft from the bone that was cut out of the knee. Once this was done, we proceeded to then place cement in both components into the knee. Once the components were secured, the instruments were removed with wet and hard stage. After this was done, we proceeded to allow the cement to dry. This was compressed in axial load with 11 mm poly. It was felt the 11-mm poly was found to be adequate on secondary testing of range of motion. It was decided to proceed and remove the trial component. Knee was copiously irrigated with pulsatile lavage. Final components was placed.   The knee was taken through range of motion, 0 to 110 range of motion with adequate stability throughout. We proceeded and placed TA within the knee. PRP was injected within the knee and subcutaneous tissue. The fascia was closed with 0-running Quill stitch. Subcutaneous tissue was closed with 2-0 Quill stitch. Skin was closed with Prineo. Dry sterile dressing was placed. Tourniquet was taken down. She was placed in a knee immobilizer, awoken from anesthesia, brought to the postop care in stable condition.       MD LYNN Poe/[email or web address]  D:  12/09/2019 10:02  T:  12/09/2019 20:30  JOB #:  [id number]

## 2019-12-10 NOTE — PROGRESS NOTES
Problem: Falls - Risk of  Goal: *Absence of Falls  Description  Document Devere Marshall Fall Risk and appropriate interventions in the flowsheet. Outcome: Progressing Towards Goal  Note: Fall Risk Interventions:  Mobility Interventions: Patient to call before getting OOB         Medication Interventions: Patient to call before getting OOB, Teach patient to arise slowly    Elimination Interventions: Call light in reach, Patient to call for help with toileting needs              Problem: Patient Education: Go to Patient Education Activity  Goal: Patient/Family Education  Outcome: Progressing Towards Goal     Problem: Pressure Injury - Risk of  Goal: *Prevention of pressure injury  Description  Document Juno Scale and appropriate interventions in the flowsheet.   Outcome: Progressing Towards Goal  Note: Pressure Injury Interventions:  Sensory Interventions: Assess changes in LOC    Moisture Interventions: Absorbent underpads, Internal/External urinary devices    Activity Interventions: Increase time out of bed    Mobility Interventions: HOB 30 degrees or less, Pressure redistribution bed/mattress (bed type)    Nutrition Interventions: Document food/fluid/supplement intake    Friction and Shear Interventions: Minimize layers                Problem: Patient Education: Go to Patient Education Activity  Goal: Patient/Family Education  Outcome: Progressing Towards Goal     Problem: Infection - Risk of, Surgical Site Infection  Goal: *Absence of surgical site infection signs and symptoms  Outcome: Progressing Towards Goal     Problem: Patient Education: Go to Patient Education Activity  Goal: Patient/Family Education  Outcome: Progressing Towards Goal     Problem: Pain  Goal: *Control of Pain  Outcome: Progressing Towards Goal     Problem: Patient Education: Go to Patient Education Activity  Goal: Patient/Family Education  Outcome: Progressing Towards Goal

## 2019-12-10 NOTE — PROGRESS NOTES
Problem: Falls - Risk of  Goal: *Absence of Falls  Description  Document Christian Ventura Fall Risk and appropriate interventions in the flowsheet.   Outcome: Progressing Towards Goal  Note: Fall Risk Interventions:  Mobility Interventions: Patient to call before getting OOB, Utilize walker, cane, or other assistive device         Medication Interventions: Patient to call before getting OOB, Teach patient to arise slowly    Elimination Interventions: Call light in reach, Patient to call for help with toileting needs, Toileting schedule/hourly rounds              Problem: Patient Education: Go to Patient Education Activity  Goal: Patient/Family Education  Outcome: Progressing Towards Goal     Problem: Pain  Goal: *Control of Pain  Outcome: Progressing Towards Goal     Problem: Patient Education: Go to Patient Education Activity  Goal: Patient/Family Education  Outcome: Progressing Towards Goal

## 2019-12-10 NOTE — PROGRESS NOTES
EPAS Search    No previous assessments found for this member.     Bairon Olivo RN    Outcomes Manager  [phone]  [phone]

## 2019-12-10 NOTE — PROGRESS NOTES
Bedside shift change report given to [address] [place] [address] Road (oncoming nurse) by Nikki Santiago (offgoing nurse). Report included the following information SBAR, Kardex, Intake/Output and MAR.

## 2019-12-10 NOTE — PROGRESS NOTES
Problem: Mobility Impaired (Adult and Pediatric)  Goal: *Acute Goals and Plan of Care (Insert Text)  Description  Physical Therapy Goals  Initiated 12/10/2019 and to be accomplished within 7 day(s)  1. Patient will move from supine to sit and sit to supine  in bed with modified independence. 2.  Patient will transfer from bed to chair and chair to bed with modified independence using the least restrictive device. 3.  Patient will perform sit to stand with modified independence. 4.  Patient will ambulate with supervision for 50 feet with the least restrictive device. 5.  Patient will ascend/descend 3 stairs with handrail(s) with minimal assistance/contact guard assist.     Outcome: Progressing Towards Goal   PHYSICAL THERAPY EVALUATION    Patient: Javier Mason (74 y.o. female)  Date: 12/10/2019  Primary Diagnosis: Left knee DJD [M17.12]  Procedure(s) (LRB):  Left total knee arthroplasty with PRP  femoral (Left) 1 Day Post-Op   Precautions: Fall, PWB  PLOF: Mod I    ASSESSMENT :  Based on the objective data described below, the patient presents with decreased strength, impaired balance and coordination and decreased functional mobility. Supine to sit, Min, required assistance for LE management and additional time. Demonstrates good sitting balance on EOB. Sit to stand, Min A, cues for hand placement for safety. Ambulated approx 5 ft to chair, w/ WW, CGA; step to gait, shortened step length, decreased raciel, required VC to maintain hand placement on [employer]. Stand to sit, CGA. Scooting, Mod I. Patient left sitting upright in chair with call bell in reach. Education provided on bed mobility, transfers, ADLs, balance, amb, safety, exercise, role of PT, plan of care, cognition, skin integrity, vitals as indicated. Educated on need for RN assistance with mobility; verbalized understanding. Patient will benefit from skilled intervention to address the above impairments.   Patient's rehabilitation potential is considered to be Good  Factors which may influence rehabilitation potential include:   []         None noted  []         Mental ability/status  [x]         Medical condition  []         Home/family situation and support systems  []         Safety awareness  []         Pain tolerance/management  []         Other:      PLAN :  Recommendations and Planned Interventions:   [x]           Bed Mobility Training             [x]    Neuromuscular Re-Education  [x]           Transfer Training                   []    Orthotic/Prosthetic Training  [x]           Gait Training                          []    Modalities  [x]           Therapeutic Exercises           []    Edema Management/Control  [x]           Therapeutic Activities            [x]    Family Training/Education  [x]           Patient Education  []           Other (comment):    Frequency/Duration: Patient will be followed by physical therapy 3-5 times a week to address goals. Discharge Recommendations: Glenroy Lora  Further Equipment Recommendations for Discharge: rolling walker     SUBJECTIVE:   Patient stated my leg felt worse laying in bed because of the stretch position. 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     Hypertension     Morbid obesity ([facility] [address]     Sleep apnea     CPAP machine    Use of cane as ambulatory aid      Past Surgical History:   Procedure Laterality Date    HX CATARACT REMOVAL Bilateral     HX  SECTION          HX COLONOSCOPY      HX HYSTERECTOMY      HX KNEE ARTHROSCOPY Right 06/10/2019    right total knee arthroplasty    HX NEPHRECTOMY Right     HX ROTATOR CUFF REPAIR Right     shoulder surgery     Barriers to Learning/Limitations: None  Compensate with: Visual Cues, Verbal Cues, Tactile Cues and Kinesthetic Cues    Home Situation:  [address] [facility] Environment: Private residence  One/Two Story Residence: Two story(Bedroom on 2nd)  Living Alone: No  Support Systems: Child(vashti)  Patient Expects to be Discharged to[de-identified] Private residence  Current DME Used/Available at Home: murali Ervin    Critical Behavior:  Neurologic State: Alert  Orientation Level: Oriented X4  Cognition: Appropriate decision making; Follows commands  Safety/Judgement: Awareness of environment  Psychosocial  Patient Behaviors: Calm; Cooperative     Strength:    Manual Muscle Testing (LE)         R     L    Hip Flexion:   4+/5  Not tested  Knee EXT:   4+/5  Not tested  Knee FLEX:   4+/5  Not tested  Ankle DF:   4+/5  4/5  _________________________________________________   Tone & Sensation:    Tone and Sensation: intact  Range Of Motion:  Bilateral LE: WFL  Functional Mobility:  Bed Mobility:  Supine to Sit: Minimum assistance  Scooting: Modified independent  Transfers:  Sit to Stand: Minimum assistance  Stand to Sit: Contact guard assistance  Bed to Chair: Contact guard assistance    Balance:   Sitting: Intact  Standing: Impaired  Standing - Static: Good  Standing - Dynamic : Fair  Ambulation/Gait Training:  Distance (ft): 5 Feet (ft)  Assistive Device: Walker, rolling  Ambulation - Level of Assistance: Contact guard assistance    Gait Abnormalities: Step to gait    Stance: Left decreased  Speed/Ginny: Pace decreased (<100 feet/min)  Step Length: Left shortened;Right shortened    Pain:  Pain level pre-treatment: 8/10   Pain level post-treatment: 7/10   Pain Scale 1: Numeric (0 - 10)        Activity Tolerance:   Fair    After treatment:   [x]         Patient left in no apparent distress sitting up in chair  []         Patient left in no apparent distress in bed  [x]         Call bell left within reach  [x]         Nursing notified  []         Caregiver present  []         Bed alarm activated  []         SCDs applied    COMMUNICATION/EDUCATION:   [x]         Role of physical therapy and plan of care in the acute care setting. [x]         Fall prevention education was provided and the patient/caregiver indicated understanding. [x]         Patient/family have participated as able in goal setting and plan of care. []         Patient/family agree to work toward stated goals and plan of care. []         Patient understands intent and goals of therapy, but is neutral about his/her participation. []         Patient is unable to participate in goal setting/plan of care: ongoing with therapy staff.     Thank you for this referral.  Lisa Leon   Time Calculation: 18 mins    Eval Complexity: History: HIGH Complexity :3+ comorbidities / personal factors will impact the outcome/ POC Exam:MEDIUM Complexity : 3 Standardized tests and measures addressing body structure, function, activity limitation and / or participation in recreation  Presentation: MEDIUM Complexity : Evolving with changing characteristics  Clinical Decision Making:Medium Complexity clinical findings, ROM, MMT, functional mobility  Overall Complexity:MEDIUM

## 2019-12-10 NOTE — PROGRESS NOTES
Discharge/Transition Planning    Problem: Discharge Planning  Goal: *Discharge to safe environment  Outcome: Progressing Towards Goal           Reason for Admission:   Osteoarthritis                  RRAT Score:     20             Do you (patient/family) have any concerns for transition/discharge? Bedroom is on 2nd floor with 17 steps              Plan for utilizing home health:   [place] vs SNF    Current Advanced Directive/Advance Care Plan:  none            Transition of Care Plan:        Interviewed patient. Verified demographics listed on face sheet with patient; all information correct. Pt with medicare and [employer] medicaid for insurance Patient stated their PCP is CARRILLO Lenz and saw in 2 weeks ago. Patient lives in 2 story home with daughter. Patient's NOK is daughter. Patient moderately independent with ADLs prior to admission. Bedroom on 2nd floor and had great difficulty ascending and descending stairs prior. DME prior to admission: walker. Discharge plan is Home and [place]. Therapy to eval and may discuss SNF with pt       Patient has designated ___daughter_____________________ to participate in his/her discharge plan and to receive any needed information. Andrae Ritter Daughter [phone]       Care Management Interventions  PCP Verified by CM: Yes(CARRILLO Lenz)  Last Visit to PCP: 11/27/19  Mode of Transport at Discharge:  Other (see comment)  Transition of Care Consult (CM Consult): Discharge Planning  Current Support Network: Relative's Home(daughter)  Confirm Follow Up Transport: Family  Plan discussed with Pt/Family/Caregiver: Yes  Discharge Location  Discharge Placement: Home with home health(vs SNF)

## 2019-12-10 NOTE — PROGRESS NOTES
1945  Received beside report from Raz Pradhan, Patient in bed resting comfortably, vital signs stable. No complaints of pain at this time. Dressing clean dry and intact. 0045  Patient complaining of pain of 7, gave Norco as ordered. Changed patient chucks due to purewick not working, offered patient to get up and use the bathroom but she stated she was tired and wanted to sleep. 0630 Patient resting comfortably, vital signs stable throughout shift. Dressing clean dry and intact. Changed purewick     [id number]  Patient complaining of pain of 6, gave Percocet as ordered.

## 2019-12-10 NOTE — PROGRESS NOTES
Maris Walls Rounded on post total knee arthroplasty. Patient and family educated: Activity:   OOB for all meals,   Walk every hour to prevent blood clots, help move better and lessen stiffness. Bend knee 10 x /hr  Do not put anything under knee. Towel roll under ankle. VTE prophylaxis:   Use SCD pumps except when walking. Ankle pumps 10 times an hour at hospital & home. Take blood thinner medication as ordered by surgeon. Do not skip a dose. Pain Control:  Pain medications side effects discussed. Wean off narcotics ASAP. Use Tylenol ( 3000 mg/24 hours) , ice, distraction, moving, & change position to help with pain. Rest between activity. Don't get nauseated. Eat a snack before taking pain medication    Do not get constipated: take stool softener/mild laxative daily while on narcotics. Incentive Spirometry:    Use of incentive spirometer 10 x/hr. Demonstration  1000 ml x 3  Wound Care: Dressing clean and intact. Educated patient on how to manage dressing and/or incision per MD protocol. Keep dressing and/or dry and intact. No lotions, powders, creams to surgical leg. .    Diet:   Eat for healing. Protein heals bone/muscle. Drink 8 glasses of water a day. Patient Safety:   Call light & belongings in reach. Call for help when want to walk or get OOB. Educational material given. Patient agreed to keep doing everything at home to prevent complications & have a successful recovery. Patient verbalizing the importance of using incentive spirometer, ankle pumping, walking frequently, doing exercised at home twice a day, taking their anticoagulant per physician instruction, taking medication and drinking water to prevent complication, and eating protein for healing. Patient MP verbalized understand. Given the opportunity for asking questions.

## 2019-12-11 LAB
HCT VFR BLD AUTO: 28.6 % (ref 35–45)
HGB BLD-MCNC: 8.8 G/DL (ref 12–16)
INR PPP: 1.4 (ref 0.8–1.2)
PROTHROMBIN TIME: 17.2 SEC (ref 11.5–15.2)

## 2019-12-11 PROCEDURE — 74011250637 HC RX REV CODE- 250/637: Performed by: HOSPITALIST

## 2019-12-11 PROCEDURE — 74011250637 HC RX REV CODE- 250/637: Performed by: ORTHOPAEDIC SURGERY

## 2019-12-11 PROCEDURE — 74011250636 HC RX REV CODE- 250/636: Performed by: ORTHOPAEDIC SURGERY

## 2019-12-11 PROCEDURE — 65270000029 HC RM PRIVATE

## 2019-12-11 PROCEDURE — 36415 COLL VENOUS BLD VENIPUNCTURE: CPT

## 2019-12-11 PROCEDURE — 85018 HEMOGLOBIN: CPT

## 2019-12-11 PROCEDURE — 85610 PROTHROMBIN TIME: CPT

## 2019-12-11 PROCEDURE — 97110 THERAPEUTIC EXERCISES: CPT

## 2019-12-11 PROCEDURE — 97530 THERAPEUTIC ACTIVITIES: CPT

## 2019-12-11 RX ADMIN — LATANOPROST 1 DROP: 50 SOLUTION OPHTHALMIC at 23:15

## 2019-12-11 RX ADMIN — ENOXAPARIN SODIUM 30 MG: 30 INJECTION, SOLUTION INTRAVENOUS; SUBCUTANEOUS at 21:11

## 2019-12-11 RX ADMIN — HYDROCODONE BITARTRATE AND ACETAMINOPHEN 1 TABLET: 10; 325 TABLET ORAL at 21:04

## 2019-12-11 RX ADMIN — POLYETHYLENE GLYCOL 3350 17 G: 17 POWDER, FOR SOLUTION ORAL at 09:06

## 2019-12-11 RX ADMIN — SENNOSIDES AND DOCUSATE SODIUM 1 TABLET: 8.6; 5 TABLET ORAL at 09:05

## 2019-12-11 RX ADMIN — FERROUS SULFATE TAB 325 MG (65 MG ELEMENTAL FE) 325 MG: 325 (65 FE) TAB at 17:28

## 2019-12-11 RX ADMIN — Medication 500 MG: at 09:05

## 2019-12-11 RX ADMIN — SODIUM CHLORIDE 10 ML: 9 INJECTION, SOLUTION INTRAMUSCULAR; INTRAVENOUS; SUBCUTANEOUS at 14:00

## 2019-12-11 RX ADMIN — FERROUS SULFATE TAB 325 MG (65 MG ELEMENTAL FE) 325 MG: 325 (65 FE) TAB at 09:05

## 2019-12-11 RX ADMIN — LOSARTAN POTASSIUM 50 MG: 50 TABLET ORAL at 09:00

## 2019-12-11 RX ADMIN — OXYCODONE HYDROCHLORIDE AND ACETAMINOPHEN 1 TABLET: 5; 325 TABLET ORAL at 06:00

## 2019-12-11 RX ADMIN — Medication 1 TABLET: at 09:05

## 2019-12-11 RX ADMIN — SENNOSIDES AND DOCUSATE SODIUM 1 TABLET: 8.6; 5 TABLET ORAL at 17:28

## 2019-12-11 RX ADMIN — SODIUM CHLORIDE 10 ML: 9 INJECTION, SOLUTION INTRAMUSCULAR; INTRAVENOUS; SUBCUTANEOUS at 06:02

## 2019-12-11 RX ADMIN — HYDROCHLOROTHIAZIDE 12.5 MG: 25 TABLET ORAL at 09:06

## 2019-12-11 RX ADMIN — MELATONIN 1 TABLET: at 09:06

## 2019-12-11 RX ADMIN — ENOXAPARIN SODIUM 30 MG: 30 INJECTION, SOLUTION INTRAVENOUS; SUBCUTANEOUS at 10:13

## 2019-12-11 NOTE — PROGRESS NOTES
Problem: Falls - Risk of  Goal: *Absence of Falls  Description  Document Yomairamarilin Powers Fall Risk and appropriate interventions in the flowsheet. Outcome: Progressing Towards Goal  Note: Fall Risk Interventions:  Mobility Interventions: Patient to call before getting OOB         Medication Interventions: Patient to call before getting OOB    Elimination Interventions: Call light in reach              Problem: Pressure Injury - Risk of  Goal: *Prevention of pressure injury  Description  Document Juno Scale and appropriate interventions in the flowsheet.   Outcome: Progressing Towards Goal  Note: Pressure Injury Interventions:  Sensory Interventions: Assess changes in LOC    Moisture Interventions: Offer toileting Q_hr    Activity Interventions: Increase time out of bed    Mobility Interventions: Pressure redistribution bed/mattress (bed type)    Nutrition Interventions: Document food/fluid/supplement intake    Friction and Shear Interventions: Minimize layers                Problem: Patient Education: Go to Patient Education Activity  Goal: Patient/Family Education  Outcome: Progressing Towards Goal

## 2019-12-11 NOTE — PROGRESS NOTES
Bedside shift change report given to Stephanie Gray (oncoming nurse) by Saul Krueger (offgoing nurse). Report included the following information SBAR, Kardex, Intake/Output and MAR.

## 2019-12-11 NOTE — PROGRESS NOTES
Problem: Mobility Impaired (Adult and Pediatric)  Goal: *Acute Goals and Plan of Care (Insert Text)  Description  Physical Therapy Goals  Initiated 12/10/2019 and to be accomplished within 7 day(s)  1. Patient will move from supine to sit and sit to supine  in bed with modified independence. 2.  Patient will transfer from bed to chair and chair to bed with modified independence using the least restrictive device. 3.  Patient will perform sit to stand with modified independence. 4.  Patient will ambulate with supervision for 50 feet with the least restrictive device. 5.  Patient will ascend/descend 3 stairs with handrail(s) with minimal assistance/contact guard assist.     Outcome: Progressing Towards Goal   PHYSICAL THERAPY TREATMENT    Patient: Anusha Rogers (74 y.o. female)  Date: 12/11/2019  Diagnosis: Left knee DJD [M17.12]   <principal problem not specified>  Procedure(s) (LRB):  Left total knee arthroplasty with PRP  femoral (Left) 2 Days Post-Op  Precautions: Fall, PWB  PLOF: Independent    ASSESSMENT:  Pt progressing well today, supervision for bed mobility, transfers and gait. Instructed pt in LE exercises for strength and ROM to improve functional mobility. Pt SBA for gait X 30 ft with RW. Pt left in chair, nurse notified, pt encouraged to perform exercises throughout the day. Progression toward goals:   [x]      Improving appropriately and progressing toward goals  []      Improving slowly and progressing toward goals  []      Not making progress toward goals and plan of care will be adjusted     PLAN:  Patient continues to benefit from skilled intervention to address the above impairments. Continue treatment per established plan of care. Discharge Recommendations:  Danielle Briscoe pending stair training  Further Equipment Recommendations for Discharge:  N/A     SUBJECTIVE:   Patient stated Jose Martin Ready had my other knee done in June.     OBJECTIVE DATA SUMMARY:   Critical Behavior:  Neurologic State: Alert  Orientation Level: Oriented X4  Cognition: Follows commands  Safety/Judgement: Awareness of environment  Functional Mobility Training:  Bed Mobility:  Supine to Sit: Supervision  Scooting: Modified independent  Transfers:  Sit to Stand: Supervision  Stand to Sit: Supervision  Balance:  Sitting: Intact  Standing: Impaired  Standing - Static: Good;Constant support  Standing - Dynamic : Good;Constant support     Ambulation/Gait Training:  Distance (ft): 30 Feet (ft)  Assistive Device: Brace/Splint; Walker, rolling  Ambulation - Level of Assistance: Stand-by assistance  Gait Abnormalities: Step to gait  Speed/Ginny: Pace decreased (<100 feet/min)  Step Length: Left shortened;Right shortened  Therapeutic Exercises:         EXERCISE   Sets   Reps   Active Active Assist   Passive Self ROM   Comments   Ankle Pumps 1 10  [x] [] [] []    Quad Sets/Glut Sets 1 20  [x] [] [] []    Hamstring Sets   [] [] [] []    Short Arc Quads   [] [] [] []    Heel Slides   [] [] [] []    Straight Leg Raises   [] [] [] []    Hip Add 1 5 [] [x] [] []    Long Arc Quads   [] [] [] []    Seated Marching   [] [] [] []    Standing Marching   [] [] [] []       [] [] [] []        Pain:  Pain level pre-treatment: 6/10  Pain level post-treatment: 6/10   Pain Intervention(s): position, brace removed      Activity Tolerance:   Good   Please refer to the flowsheet for vital signs taken during this treatment. After treatment:   [x] Patient left in no apparent distress sitting up in chair  [] Patient left in no apparent distress in bed  [x] Call bell left within reach  [x] Nursing notified  [] Caregiver present  [] Bed alarm activated  [] SCDs applied      COMMUNICATION/EDUCATION:   []           []         Fall prevention education was provided and the patient/caregiver indicated understanding. []         Patient/family have participated as able in working toward goals and plan of care.   []         Patient/family agree to work toward stated goals and plan of care. []         Patient understands intent and goals of therapy, but is neutral about his/her participation. []         Patient is unable to participate in stated goals/plan of care: ongoing with therapy staff. [x]         Role of Physical Therapy in the acute care setting.         Marcus Vieira, PTA   Time Calculation: 32 mins

## 2019-12-11 NOTE — ROUTINE PROCESS
1930 Bedside and Verbal shift change  Received from Maurisio Mata RN (outgoing nurse), to LAURITA Mckeon (oncoming)  Pt. Is AOX 4. IV patent and zac trevizo, Pt. With tolerable  pain at this time. Report included the following information SBAR, Kardex, Procedure Summary, Intake/Output, MAR, Recent Lab Results. Will resume care and monitor Pt. Condition. Pt. Educated on call bell when in need of help and assistance. Pt. verbalized understanding. Pt. Head to toe Assessment Done and documented. Pt. OOB to bathroom per walker and x1 max assist.  Pt. Voided clear yellow urine. 2030  Pt. In bed, denies discomfort. 2200 Pt. Made no complaints. 0000  Pt. Resting in bed, eyes closed, easily awaken. 0200  Pt. Denies pain. 0400  Pt. Able to rest and sleep well throughout the shift.    0600  Pt. Given pain meds per MAR. Given complete care, bath, back care, in continent care, linen changed and gown. 0700  Pt. Verbalized relief from pain. Verbal and bedside Shift changed report given to Miranda Wyatt RN (oncoming RN) on Pt. Condition. Report consisted of patients Situation, History, Activities, intake/output,  Background, Assessment and Recommendations(SBAR). Information from the following report(s) Kardex, order Summary, Lab results and MAR was reviewed with the receiving nurse. Opportunity for questions and clarification was provided.

## 2019-12-11 NOTE — PROGRESS NOTES
Plan changed to snf, therapy recommends. Pt will be alone during day. Plan snf. Spoke with pt and dtr. Pt agrees to snf.left list in room for dtr to review, [facility]. they agree for me to send to [facility] facilities in case LT does not have bed. Posted in [place] and Zoe Majeste. [facility] (SNF) Provider list has been given to the patient and/or patient representative. Patient and/or patient representative has signed the [place] of Choice selecting _________________________ as their preference facility and a copy given. Both SNF Provider list and [place] of Choice have been placed on the chart.

## 2019-12-11 NOTE — PROGRESS NOTES
Progress Note      Post Operative Day: 2    Assessment:    1. Status post LEFT  TOTAL KNEE ARTHROPLASTY [00489] (KNEE ARTHROPLASTY TOTAL) for Left knee DJD [M17.12] 12/9/2019,  Progressing. PLAN:    1. Mobilize. Continue P.T.  2. 50% to full in knee immobilizer  3. Lovenox for DVT prophylaxis   4. Discharge Planning home tomorrow if doing well           HPI: Maritza Vicente is a 68 y.o. female patient without new orthopaedic changes. Blood pressure 121/71, pulse 82, temperature 97.7 °F (36.5 °C), resp. rate 16, height 4' 9\" (1.448 m), weight 90.7 kg (200 lb), SpO2 98 %. CBC w/Diff   Lab Results   Component Value Date/Time    WBC 10.7 06/24/2019 03:50 AM    RBC 3.21 (L) 06/24/2019 03:50 AM    HGB 8.8 (L) 12/11/2019 03:58 AM    HCT 28.6 (L) 12/11/2019 03:58 AM    MCV 87.9 06/24/2019 03:50 AM    MCH 27.1 06/24/2019 03:50 AM    MCHC 30.9 (L) 06/24/2019 03:50 AM    RDW 18.2 (H) 06/24/2019 03:50 AM    Lab Results   Component Value Date/Time    MONOS 11 (H) 06/24/2019 03:50 AM    EOS 2 06/24/2019 03:50 AM    BASOS 0 06/24/2019 03:50 AM    RDW 18.2 (H) 06/24/2019 03:50 AM             Physical Assessment:  General: in no apparent distress, well developed and well nourished, non-toxic, in no respiratory distress and acyanotic, alert and oriented times 3   Wound: no drainage, covered with sterile dressing   Extremities:  Neurovascular intact LLE. Compartments soft and NT distal to surgical  site. Able to contract quadriceps. Plantar and dorsiflexion intact. Palpable DP/PT pulses noted. Denies paresthesias    Dressing:  CDI   DVT Exam:   No exam evidence to suggest DVT. Compartments soft and NT.           Radiology: no new ortho studies          Office [phone]

## 2019-12-11 NOTE — PROGRESS NOTES
Problem: Falls - Risk of  Goal: *Absence of Falls  Description  Document Yisel Navarrete Fall Risk and appropriate interventions in the flowsheet. Outcome: Progressing Towards Goal  Note: Fall Risk Interventions:  Mobility Interventions: Patient to call before getting OOB, Utilize walker, cane, or other assistive device         Medication Interventions: Patient to call before getting OOB, Teach patient to arise slowly    Elimination Interventions: Call light in reach, Patient to call for help with toileting needs, Toileting schedule/hourly rounds              Problem: Patient Education: Go to Patient Education Activity  Goal: Patient/Family Education  Outcome: Progressing Towards Goal     Problem: Pressure Injury - Risk of  Goal: *Prevention of pressure injury  Description  Document Juno Scale and appropriate interventions in the flowsheet.   Outcome: Progressing Towards Goal  Note: Pressure Injury Interventions:  Sensory Interventions: Assess changes in LOC, Keep linens dry and wrinkle-free    Moisture Interventions: Absorbent underpads, Offer toileting Q_hr    Activity Interventions: Increase time out of bed    Mobility Interventions: HOB 30 degrees or less, Pressure redistribution bed/mattress (bed type)    Nutrition Interventions: Document food/fluid/supplement intake    Friction and Shear Interventions: Minimize layers                Problem: Patient Education: Go to Patient Education Activity  Goal: Patient/Family Education  Outcome: Progressing Towards Goal     Problem: Infection - Risk of, Surgical Site Infection  Goal: *Absence of surgical site infection signs and symptoms  Outcome: Progressing Towards Goal     Problem: Patient Education: Go to Patient Education Activity  Goal: Patient/Family Education  Outcome: Progressing Towards Goal     Problem: Pain  Goal: *Control of Pain  Outcome: Progressing Towards Goal     Problem: Patient Education: Go to Patient Education Activity  Goal: Patient/Family Education  Outcome: Progressing Towards Goal     Problem: Discharge Planning  Goal: *Discharge to safe environment  Outcome: Progressing Towards Goal     Problem: Patient Education: Go to Patient Education Activity  Goal: Patient/Family Education  Outcome: Progressing Towards Goal     Problem: Discharge Planning  Goal: *Discharge to safe environment  Outcome: Progressing Towards Goal

## 2019-12-11 NOTE — PROGRESS NOTES
0745:Patient care assumed at this time. Patient is alert and oriented x4. IV site is clean dry and intact with no redness or swelling. Left knee is in immobilizer, clean dry and intact dressing. 0900:Patient helped to bedside commode and back to bed, tolerated well.        1100:Patient sitting in chair, denies complaints. 1420:Patient sitting in chair, denies complaints. 1720: Denies complaints, call light in reach.

## 2019-12-11 NOTE — ROUTINE PROCESS
Bedside and Verbal shift change report given to Baron Burt RN (oncoming nurse) by Ivone Echeverria RN (offgoing nurse). Report included the following information SBAR, Kardex, Intake/Output, MAR and Recent Results.

## 2019-12-11 NOTE — PROGRESS NOTES
UAI/95/96/97 completed and submitted in [facility] website for [facility] LTSS service

## 2019-12-12 VITALS
RESPIRATION RATE: 18 BRPM | DIASTOLIC BLOOD PRESSURE: 88 MMHG | HEIGHT: 57 IN | SYSTOLIC BLOOD PRESSURE: 136 MMHG | BODY MASS INDEX: 43.15 KG/M2 | OXYGEN SATURATION: 97 % | WEIGHT: 200 LBS | TEMPERATURE: 97.7 F | HEART RATE: 90 BPM

## 2019-12-12 LAB
INR PPP: 1.4 (ref 0.8–1.2)
PROTHROMBIN TIME: 16.5 SEC (ref 11.5–15.2)

## 2019-12-12 PROCEDURE — 36415 COLL VENOUS BLD VENIPUNCTURE: CPT

## 2019-12-12 PROCEDURE — 74011250636 HC RX REV CODE- 250/636: Performed by: ORTHOPAEDIC SURGERY

## 2019-12-12 PROCEDURE — 85610 PROTHROMBIN TIME: CPT

## 2019-12-12 PROCEDURE — 77030012893

## 2019-12-12 PROCEDURE — 74011250637 HC RX REV CODE- 250/637: Performed by: HOSPITALIST

## 2019-12-12 PROCEDURE — 97116 GAIT TRAINING THERAPY: CPT

## 2019-12-12 PROCEDURE — 74011250637 HC RX REV CODE- 250/637: Performed by: ORTHOPAEDIC SURGERY

## 2019-12-12 RX ORDER — HYDROCODONE BITARTRATE AND ACETAMINOPHEN 10; 300 MG/1; MG/1
1 TABLET ORAL
Qty: 40 TAB | Refills: 0 | Status: SHIPPED | OUTPATIENT
Start: 2019-12-12 | End: 2019-12-19

## 2019-12-12 RX ORDER — NALOXONE HYDROCHLORIDE 4 MG/.1ML
SPRAY NASAL
Qty: 1 EACH | Refills: 1 | Status: SHIPPED | OUTPATIENT
Start: 2019-12-12

## 2019-12-12 RX ADMIN — POLYETHYLENE GLYCOL 3350 17 G: 17 POWDER, FOR SOLUTION ORAL at 09:23

## 2019-12-12 RX ADMIN — Medication 1 TABLET: at 09:24

## 2019-12-12 RX ADMIN — SENNOSIDES AND DOCUSATE SODIUM 1 TABLET: 8.6; 5 TABLET ORAL at 09:23

## 2019-12-12 RX ADMIN — OXYCODONE HYDROCHLORIDE AND ACETAMINOPHEN 1 TABLET: 5; 325 TABLET ORAL at 11:54

## 2019-12-12 RX ADMIN — SODIUM CHLORIDE 10 ML: 9 INJECTION, SOLUTION INTRAMUSCULAR; INTRAVENOUS; SUBCUTANEOUS at 14:00

## 2019-12-12 RX ADMIN — HYDROCHLOROTHIAZIDE 12.5 MG: 25 TABLET ORAL at 09:23

## 2019-12-12 RX ADMIN — ENOXAPARIN SODIUM 30 MG: 30 INJECTION, SOLUTION INTRAVENOUS; SUBCUTANEOUS at 09:32

## 2019-12-12 RX ADMIN — LOSARTAN POTASSIUM 50 MG: 50 TABLET ORAL at 09:30

## 2019-12-12 RX ADMIN — FERROUS SULFATE TAB 325 MG (65 MG ELEMENTAL FE) 325 MG: 325 (65 FE) TAB at 09:23

## 2019-12-12 RX ADMIN — MELATONIN 1 TABLET: at 09:23

## 2019-12-12 RX ADMIN — Medication 500 MG: at 09:30

## 2019-12-12 NOTE — PROGRESS NOTES
Received call from [address] [place] [address]. lovenox mentioned in dc summary without dose. Called dar, left message for her to please add lovenox with dose and frequency to dc summary.

## 2019-12-12 NOTE — PROGRESS NOTES
Problem: Falls - Risk of  Goal: *Absence of Falls  Description  Document Doris Gray Fall Risk and appropriate interventions in the flowsheet. Outcome: Progressing Towards Goal  Note: Fall Risk Interventions:  Mobility Interventions: Patient to call before getting OOB         Medication Interventions: Patient to call before getting OOB    Elimination Interventions: Patient to call for help with toileting needs              Problem: Patient Education: Go to Patient Education Activity  Goal: Patient/Family Education  Outcome: Progressing Towards Goal     Problem: Pressure Injury - Risk of  Goal: *Prevention of pressure injury  Description  Document Juno Scale and appropriate interventions in the flowsheet.   Outcome: Progressing Towards Goal  Note: Pressure Injury Interventions:  Sensory Interventions: Assess changes in LOC    Moisture Interventions: Absorbent underpads    Activity Interventions: Pressure redistribution bed/mattress(bed type)    Mobility Interventions: HOB 30 degrees or less    Nutrition Interventions: Document food/fluid/supplement intake    Friction and Shear Interventions: Apply protective barrier, creams and emollients

## 2019-12-12 NOTE — ROUTINE PROCESS
Bedside and Verbal shift change report given to [id number] (oncoming nurse) by Akin Colin RN   (offgoing nurse). Report included the following information SBAR, Kardex, Intake/Output, MAR and Recent Results.

## 2019-12-12 NOTE — PROGRESS NOTES
UAI successfully processed in EPAS. Printed. Will get MD to sign DMAS 96 before making copies. 1135--VMM left for Roxie Wright,  to Dr Radha Delacruz, requesting his signature on the DMAS-96 document since she is Medicaid patient going to SNF. Requested call back.     Bairon Olivo RN    Outcomes Manager  [phone]  [phone]

## 2019-12-12 NOTE — PROGRESS NOTES
Pt has a snf bed at [facility] for today. Therapy has recommended and pt prefers to go to snf. Notified pt and her dtr, ms Ritter. Left message for dar to call me.

## 2019-12-12 NOTE — PROGRESS NOTES
Shift Summary Note:  Assumed are of patient in bed awake & quiet. Ambulating with minimal assist to bathroom with walker. Uneventful night VSS, call bell within reach.   Patient Vitals for the past 12 hrs:   Temp Pulse Resp BP SpO2   12/12/19 0515 98.7 °F (37.1 °C) 89 18 136/68 98 %   12/11/19 2355 98.7 °F (37.1 °C) 92 16 116/67 99 %   12/11/19 2101 99.2 °F (37.3 °C) 90 18 145/79 98 %

## 2019-12-12 NOTE — PROGRESS NOTES
0730:Patient care assumed at this time. Patient is alert and oriented x4. Denies complaints. IV site is clean dry and intact with no redness or swelling. Left leg is in precautions, with clean dry and intact dressing, knee immobilizer in place. Patient ambulated to the bathroom, denies complaints. 1000:Patient sitting in chair, denies complaints. Per , bed at [facility]. Waiting discharge orders. Dasia made aware per . 1100: MD paged regarding discharge order, and medical not following the patient. Message left. 1115: Dasia called and stated she would make the changes in the chart. 1325: Report called to Yesy Garcia at [facility]. Stated he would call back. 1330:Dasia called regarding no prescriptions in the patient's chart. 1450: MD Shuff to write prescriptions per allerga. Placed in chart. 1545: [facility] called regarding report. Per , lovenox is missing dose and for how many days from discharge summary. Message left. Report left. 1559: Dasia called, stated she was fixing the discharge summary. 1615: D/C summary fixed, Discharge instructions given. Patient voiced understanding. Packet given to patient.

## 2019-12-12 NOTE — PROGRESS NOTES
completed follow up visit with patient in room 97 70 84 and a Spiritual assessment of patient and offered Pastoral care support .  Chaplains will continue to follow and will provide pastoral care on an as needed/requested basis    Chaplain Diego Adames   Board Certified [address] [place] [address]   [phone]

## 2019-12-12 NOTE — DISCHARGE INSTRUCTIONS
DISCHARGE SUMMARY from Nurse    PATIENT INSTRUCTIONS:    After general anesthesia or intravenous sedation, for 24 hours or while taking prescription Narcotics:  · Limit your activities  · Do not drive and operate hazardous machinery  · Do not make important personal or business decisions  · Do  not drink alcoholic beverages  · If you have not urinated within 8 hours after discharge, please contact your surgeon on call. Report the following to your surgeon:  · Excessive pain, swelling, redness or odor of or around the surgical area  · Temperature over 100.5  · Nausea and vomiting lasting longer than 4 hours or if unable to take medications  · Any signs of decreased circulation or nerve impairment to extremity: change in color, persistent  numbness, tingling, coldness or increase pain  · Any questions    What to do at Home:  Recommended activity: Activity as tolerated and no driving for today. If you experience any of the following symptoms increasing pain or swelling. , please follow up with Dr. Navarro Tyler    *  Please give a list of your current medications to your Primary Care Provider. *  Please update this list whenever your medications are discontinued, doses are      changed, or new medications (including over-the-counter products) are added. *  Please carry medication information at all times in case of emergency situations. These are general instructions for a healthy lifestyle:    No smoking/ No tobacco products/ Avoid exposure to second hand smoke  Surgeon General's Warning:  Quitting smoking now greatly reduces serious risk to your health.     Obesity, smoking, and sedentary lifestyle greatly increases your risk for illness    A healthy diet, regular physical exercise & weight monitoring are important for maintaining a healthy lifestyle    You may be retaining fluid if you have a history of heart failure or if you experience any of the following symptoms:  Weight gain of 3 pounds or more overnight or 5 pounds in a week, increased swelling in our hands or feet or shortness of breath while lying flat in bed. Please call your doctor as soon as you notice any of these symptoms; do not wait until your next office visit. Patient armband removed and shredded    MyChart Activation    Thank you for requesting access to [facility]. Please follow the instructions below to securely access and download your online medical record. [employer] allows you to send messages to your doctor, view your test results, renew your prescriptions, schedule appointments, and more. How Do I Sign Up? 1. In your internet browser, go to [email or web address]  2. Click on the First Time User? Click Here link in the Sign In box. You will be redirect to the New Member Sign Up page. 3. Enter your [employer] Access Code exactly as it appears below. You will not need to use this code after youve completed the sign-up process. If you do not sign up before the expiration date, you must request a new code. [employer] Access Code: [email or web address]  Expires: 2020  3:30 PM (This is the date your [employer] access code will )    4. Enter the last four digits of your Social Security Number (xxxx) and Date of Birth (mm/dd/yyyy) as indicated and click Submit. You will be taken to the next sign-up page. 5. Create a [employer] ID. This will be your [employer] login ID and cannot be changed, so think of one that is secure and easy to remember. 6. Create a [employer] password. You can change your password at any time. 7. Enter your Password Reset Question and Answer. This can be used at a later time if you forget your password. 8. Enter your e-mail address. You will receive e-mail notification when new information is available in [address] 9. Click Sign Up. You can now view and download portions of your medical record. 10. Click the Download Summary menu link to download a portable copy of your medical information.     Additional Information    If you have questions, please visit the Frequently Asked Questions section of the [email or web address] website at [email or web address] Remember, [facility] is NOT to be used for urgent needs. For medical emergencies, dial 911. The discharge information has been reviewed with the patient. The patient verbalized understanding. Discharge medications reviewed with the patient and appropriate educational materials and side effects teaching were provided.   ___________________________________________________________________________________________________________________________________

## 2019-12-12 NOTE — DISCHARGE SUMMARY
Discharge Summary    Admit Date: 2019  Discharge Date:  2019     Patient ID:   Name:  Radha Valente      Age:  68 y.o.    :  1945    Admitting Diagnosis: Left knee DJD [M17.12]     Post Operative Day: 3    Operative Procedures:  TOTAL KNEE ARTHROPLASTY [95848] (KNEE ARTHROPLASTY TOTAL)      Isolation Precautions:   Not required. Patient is not currently contagious. Physical Exam on Discharge:  Visit Vitals  /68 (BP 1 Location: Right arm, BP Patient Position: Sitting)   Pulse 89   Temp 98.7 °F (37.1 °C)   Resp 18   Ht 4' 9\" (1.448 m)   Wt 90.7 kg (200 lb)   SpO2 98%   BMI 43.28 kg/m²         General: in no apparent distress   Wound: clean, dry   Extremities:  Neurovascular intact    Dressing:  dry   DVT Exam:   No evidence of DVT seen on physical exam;  compartments soft and NT. Relevant labs within last 72 hours:    CBC w/Diff    Lab Results   Component Value Date/Time    WBC 10.7 2019 03:50 AM    RBC 3.21 (L) 2019 03:50 AM    HCT 28.6 (L) 2019 03:58 AM    MCV 87.9 2019 03:50 AM    MCH 27.1 2019 03:50 AM    MCHC 30.9 (L) 2019 03:50 AM    RDW 18.2 (H) 2019 03:50 AM    Lab Results   Component Value Date/Time    MONOS 11 (H) 2019 03:50 AM    EOS 2 2019 03:50 AM    BASOS 0 2019 03:50 AM    RDW 18.2 (H) 2019 03:50 AM          BMP   Lab Results   Component Value Date     12/10/2019    CO2 29 12/10/2019    BUN 21 (H) 12/10/2019          Coagulation   Lab Results   Component Value Date    INR 1.4 (H) 2019    APTT 33.9 2019              Condition at discharge: Afebrile  Ambulating  Eating, Drinking, Voiding  Stable    Current Discharge Medication List      CONTINUE these medications which have NOT CHANGED    Details   polyethylene glycol (MIRALAX) 17 gram packet Take 1 Packet by mouth daily.   Qty: 30 Packet, Refills: 0      ferrous sulfate 325 mg (65 mg iron) tablet Take 1 Tab by mouth two (2) times daily (with meals). Qty: 60 Tab, Refills: 0      ascorbic acid, vitamin C, (VITAMIN C) 500 mg tablet Take 1 Tab by mouth daily. Qty: 30 Tab, Refills: 0      latanoprost (XALATAN) 0.005 % ophthalmic solution Administer 1 Drop to right eye nightly. cholecalciferol (VITAMIN D3) 1,000 unit tablet 1,000 Units. calcium-cholecalciferol, d3, (CALCIUM 600 + D) 600-125 mg-unit tab Take  by mouth daily. losartan-hydroCHLOROthiazide (HYZAAR) 50-12.5 mg per tablet Take 1 Tab by mouth daily. diclofenac (VOLTAREN) 1 % gel Apply 2 g to affected area as needed. Indications: OSTEOARTHRITIS      Lovenox 30mg subcutaneous bid for 14 days          PCP:  Aparna Lenz PA        Disposition:  Clear for discharge to rehab. Follow-up in the office in 2 weeks with Dr. Yara Barrett; call [phone] to schedule appointment. Wound Care: dressing down DOD 10-14    DVT prophylaxis:  Lovenox ortho per ortho for 14 days.     Weightbearing Status: Vandana Montanez PA-C  12/12/2019  Office [phone]  Cell [phone]

## 2019-12-12 NOTE — PROGRESS NOTES
Problem: Mobility Impaired (Adult and Pediatric)  Goal: *Acute Goals and Plan of Care (Insert Text)  Description  Physical Therapy Goals  Initiated 12/10/2019 and to be accomplished within 7 day(s)  1. Patient will move from supine to sit and sit to supine  in bed with modified independence. 2.  Patient will transfer from bed to chair and chair to bed with modified independence using the least restrictive device. 3.  Patient will perform sit to stand with modified independence. 4.  Patient will ambulate with supervision for 50 feet with the least restrictive device. 5.  Patient will ascend/descend 3 stairs with handrail(s) with minimal assistance/contact guard assist.     Outcome: Progressing Towards Goal   PHYSICAL THERAPY TREATMENT    Patient: Anusha Rogers (74 y.o. female)  Date: 12/12/2019  Diagnosis: Left knee DJD [M17.12]   <principal problem not specified>  Procedure(s) (LRB):  Left total knee arthroplasty with PRP  femoral (Left) 3 Days Post-Op  Precautions: Fall, PWB  PLOF: Mod I    ASSESSMENT:  Pt progressing well this morning; able to participate in gait training without knee immobilizer with good stance and balance, no buckling. Pt instructed in LE exercises for strength and ROM. Progression toward goals:   [x]      Improving appropriately and progressing toward goals  []      Improving slowly and progressing toward goals  []      Not making progress toward goals and plan of care will be adjusted     PLAN:  Patient continues to benefit from skilled intervention to address the above impairments. Continue treatment per established plan of care. Discharge Recommendations: To Be Determined  Further Equipment Recommendations for Discharge:  N/A     SUBJECTIVE:   Patient stated I dont really have any pain.     OBJECTIVE DATA SUMMARY:   Critical Behavior:  Neurologic State: Alert  Orientation Level: Oriented X4  Cognition: Follows commands  Safety/Judgement: Awareness of environment  Functional Mobility Training:  Transfers:  Sit to Stand: Modified independent  Stand to Sit: Modified independent  Balance:  Sitting: Intact  Standing: Impaired  Standing - Static: Good;Constant support  Standing - Dynamic : Good;Constant support     Ambulation/Gait Training:  Ambulation - Level of Assistance: Supervision  Gait Abnormalities: Step to gait  Speed/Ginny: Pace decreased (<100 feet/min)  Step Length: Left shortened;Right shortened  Distance: 120 ft  Therapeutic Exercises:         EXERCISE   Sets   Reps   Active Active Assist   Passive Self ROM   Comments   Ankle Pumps 1 20  [x] [] [] []    Quad Sets/Glut Sets 1 10  [x] [] [] []    Hamstring Sets   [] [] [] []    Short Arc Quads   [] [] [] []    Heel Slides 1 10 [] [] [] [x] With sheet   Straight Leg Raises   [] [] [] []    Hip Add   [] [] [] []    Long Arc Quads   [] [] [] []    Seated Marching   [] [] [] []    Standing Marching   [] [] [] []       [] [] [] []        Pain:  Pain level pre-treatment: 0/10  Pain level post-treatment: 0/10   Pain Intervention(s): n/a      Activity Tolerance:   Good   Please refer to the flowsheet for vital signs taken during this treatment. After treatment:   [x] Patient left in no apparent distress sitting up in chair  [] Patient left in no apparent distress in bed  [] Call bell left within reach  [x] Nursing notified  [] Caregiver present  [] Bed alarm activated  [] SCDs applied      COMMUNICATION/EDUCATION:   []           []         Fall prevention education was provided and the patient/caregiver indicated understanding. []         Patient/family have participated as able in working toward goals and plan of care. []         Patient/family agree to work toward stated goals and plan of care. []         Patient understands intent and goals of therapy, but is neutral about his/her participation. []         Patient is unable to participate in stated goals/plan of care: ongoing with therapy staff.   [x] Role of Physical Therapy in the acute care setting.         Yajaira Rosales PTA   Time Calculation: 10 mins

## 2019-12-12 NOTE — PROGRESS NOTES
[facility]'d [facility] to [facility] [phone]. Called and notified devyn there. Confirmation received. Placed scripts in envelope. Reminded nurse, sven to give packet to pt's dtr to take to [place].

## 2019-12-12 NOTE — PROGRESS NOTES
Problem: Discharge Planning  Goal: *Discharge to safe environment  Outcome: resolved/met    [facility]'d [facility]. Dc summary placed in envelope. Pt's dtr will transport after work at [address] ok with time of transport. Notified sven, nurse, notified pt. Care Management Interventions  PCP Verified by CM: Yes  Last Visit to PCP: 11/27/19  Palliative Care Criteria Met (RRAT>21 & CHF Dx)?: No  Mode of Transport at Discharge: Other (see comment)  Transition of Care Consult (CM Consult): Discharge Planning  Discharge Durable Medical Equipment: No  Physical Therapy Consult: Yes  Occupational Therapy Consult: Yes  Speech Therapy Consult: No  Current Support Network: Relative's Home  Confirm Follow Up Transport: Family  Plan discussed with Pt/Family/Caregiver: Yes  Freedom of Choice Offered: Yes  Discharge Location  Discharge Placement: Skilled nursing facility      Transition of care to SNF: [facility]     Communication to Patient/Family:  Met with patient and family, and they are agreeable to the transition plan. The Plan for Transition of Care is related to the following treatment goals: PT/OT    The Patient and/or patient representative Meryle Fallow was provided with a choice of provider and agrees   with the discharge plan. Yes [x] No []    Freedom of choice list was provided with basic dialogue that supports the patient's individualized plan of care/goals and shares the quality data associated with the providers. Yes [x] No []    SNF/Rehab Transition:  Patient has been accepted to [place], [place] , and meets criteria for admission. Patient will transported by dtrand expected to leave at [phone]. Communication to SNF/Rehab:  Bedside RN, sven, has been notified to update the transition plan to the facility and call report Report to [phone].      Discharge information has been updated on the AVS and sent via [facility] and/or CC link.    Discharge instructions to be fax'd to facility at [facility] [id number] [phone] #). Please include all hard scripts for controlled substances, med rec and dc summary, and AVS in packet. Please medicate for pain prior to dc if possible and needed to help offset delay when patient first arrives to facility. Reviewed and confirmed with facility, [facility] can manage the patient care needs for the following:     Mee Huber with (X) only those applicable:  Medication:  [x]Medications are available at the facility  []IV Antibiotics    []Controlled Substance - hard copies available sent. []Weekly Labs    Equipment:  []CPAP/BiPAP  []Wound Vacuum  []Early or Urinary Device  []PICC/Central Line  []Nebulizer  []Ventilator    Treatment:  []Isolation (for MRSA, VRE, etc.)  []Surgical Drain Management  []Tracheostomy Care  []Dressing Changes  []Dialysis with transportation  []PEG Care  []Oxygen  []Daily Weights for Heart Failure    Dietary:  []Any diet limitations  []Tube Feedings   []Total Parenteral Management (TPN)    Financial Resources:  []Medicaid Application Completed    [x]UAI Completed and copy given to pt/family    []A screening has previously been completed. []Level II Completed    [] Private pay individual who will not become financially eligible for Medicaid within 6 months from admission to a [address] [place] [address]. [] Individual refused to have screening conducted. []Medicaid Application Completed    []The screening denied because it was determined individual did not need/did not qualify for nursing facility level of care. [] Out of state residents seeking direct admission to a [address] facility.   [] Individuals who are inpatients of an out of state hospital, or in state or out of state veterans/ hospital and seek direct admission to a [address] facility  [] Individuals who are pateints or residents of a state owned/operated facility that is licensed by Department of Behavioral Services (DBHDS) and seek direct admission to [address] [place] [address]  [] A screening not required for enrollment in [facility] Hospice services as set out in [facility] 30-  [] [facility]) staff shall perform screenings of the [facility] clients. Advanced Care Plan:  []Surrogate Decision Maker of Care  []POA  []Communicated Code Status and copy sent.     Other:

## 2019-12-16 NOTE — PROGRESS NOTES
12/16/19 1055- In receipt of [id number] signed by MD. Patient discharged to [facility]. SNF on 12/12/19.  Eletha Peabody in admissions at [facility] and advised that faxing UAI to [phone] with receipt confirmation    Allan Sands RN    Outcomes Manager  [phone]  [phone]

## 2020-01-02 ENCOUNTER — PATIENT OUTREACH (OUTPATIENT)
Dept: CASE MANAGEMENT | Age: 75
End: 2020-01-02

## 2020-01-02 NOTE — PROGRESS NOTES
Community Care Team Documentation for Patient in [facility]     Patient discharged from [facility] [facility] to [address] [facility], on 12/12/19. Hospital Discharge diagnosis:       Total Knee Arthroplasty    SNF Attending Provider:       Anticipated discharge date from SNF:  12/21/19    PCP : CARRILLO Rdz    CTN:     [facility] Team rounds completed, updates provided by facility. Brief Summary of Care:    Patient was receiving PT/OT and made good progress. She was d/c home on 12/21/19 with [facility]. RRAT:  Low Risk            11       Total Score        3 Has Seen PCP in Last 6 Months (Yes=3, No=0)    4 IP Visits Last 12 Months (1-3=4, 4=9, >4=11)    4 Charlson Comorbidity Score (Age + Comorbid Conditions)        Criteria that do not apply:    . Living with Significant Other. Assisted Living. LTAC. SNF. or   Rehab    Patient Length of Stay (>5 days = 3)    Pt.  Coverage (Medicare=5 , Medicaid, or Self-Pay=4)        Active Ambulatory Problems     Diagnosis Date Noted    Obesity, morbid ([address] 11/14/2018    Absent kidney, acquired 03/28/2019    Essential hypertension 12/31/2016    Obstructive sleep apnea 01/05/2016    Tricompartment osteoarthritis of knees, bilateral 01/01/2016    Type 2 diabetes mellitus without complication ([address] 09/69/0031    Morbid obesity ([address] 01/05/2016    Osteoarthritis of knee 06/10/2019    Right knee DJD 06/10/2019    Acute blood loss as cause of postoperative anemia 06/13/2019    Left knee DJD 12/09/2019     Resolved Ambulatory Problems     Diagnosis Date Noted    No Resolved Ambulatory Problems     Past Medical History:   Diagnosis Date    Arthritis     Diabetes ([address] 08/2016    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

## 2020-01-07 NOTE — PROGRESS NOTES
[facility]  [address]  [place], [address]  [phone] fax    Physical Therapy Discharge Summary    Patient name: Maris Walls Start of Care: 2019   Referral Veda Jeans, MD : 1945               Medical Diagnosis: Pain in right knee [M25.561]  Payor: [place] MEDICARE / Plan: VA MEDICARE PART A & B / Product Type: Medicare /  Onset Date:pain has worsened in the last year               Treatment Diagnosis: right knee pain   Prior Hospitalization: see medical history Provider#: [id number]   Medications: Verified on Patient summary List    Comorbidities: Arthritis, Back pain, BMI over 30, DM, HTN, kidney/bladder/urination problems, Prior Surgery- kidney removed, shoulder surgery, hysterectomy, .    Prior Level of Function: Patient has been utilizing a SPC for the last 3 years, and is able to do light household management.   Visits from Start of Care: 11    Missed Visits: 1  Reporting Period : 9/10/2019 to 10/02/2019    Summary of Care:  Updated Goals to be accomplished in 2 weeks:  1. The patient will navigate 5 stairs with reciprocal gait pattern utilizing 1 HR to maximize ease of stair negotiation. Not Met 19, No change 10/2/19-Pt contiues to navagate stairs in a step to step pattern with 1 HR ascending and 2 HR descending. 2. The patient will improve FOTO score to 55 to maximize quality of life. 3. The patient will improve flexion ROM to 90 degrees to improve ease of transfers. Progressing 10/2/19- right knee flex ROM 88 deg    ASSESSMENT/RECOMMENDATIONS: The patient did make some progress over the course of her care, but lacked significant improvement since last PN. Her FOTO score improved to 41, however her ability to navigate stairs and ROM plateaued. She has been issued discharge instructions and educated to continue HEP.        [x]Discontinue therapy: []Patient has reached or is progressing toward set goals      []Patient is non-compliant or has abdicated      [x]Due to lack of appreciable progress towards set [address], PT 1/7/2020 1:49 PM

## 2020-01-27 ENCOUNTER — HOSPITAL ENCOUNTER (OUTPATIENT)
Dept: PHYSICAL THERAPY | Age: 75
Discharge: HOME | End: 2020-01-27
Payer: MEDICARE

## 2020-01-27 PROCEDURE — 97140 MANUAL THERAPY 1/> REGIONS: CPT

## 2020-01-27 PROCEDURE — 97162 PT EVAL MOD COMPLEX 30 MIN: CPT

## 2020-01-27 PROCEDURE — 97110 THERAPEUTIC EXERCISES: CPT

## 2020-01-28 NOTE — PROGRESS NOTES
PT DAILY TREATMENT NOTE 10-18    Patient Name: Kita Cole  Date:2020  : 1945  [x]  Patient  Verified  Payor: VA MEDICARE / Plan: VA MEDICARE PART A & B / Product Type: Medicare /    In time:6:02  Out time:6:46  Total Treatment Time (min): 44  Visit #: 1 of     Medicare/BCBS Only   Total Timed Codes (min):  23 1:1 Treatment Time:  23       Treatment Area: Pain in left knee [M25.562]    SUBJECTIVE  Pain Level (0-10 scale): 9/10  Any medication changes, allergies to medications, adverse drug reactions, diagnosis change, or new procedure performed?: [x] No    [] Yes (see summary sheet for update)  Subjective functional status/changes:   [] No changes reported  The patient has a chief complaint of left knee stiffness and pain. OBJECTIVE  21 min [x]Eval                  []Re-Eval       15 min Therapeutic Exercise:  [] See flow sheet :   Rationale: increase ROM and increase strength to improve the patients ability to improve ADL ease. 8 min Manual Therapy:  Left knee tibiofemoral mobs extension/flexion grade III with patient in supine. Rationale: decrease pain, increase ROM and increase tissue extensibility to improve ADL ease. With   [] TE   [] TA   [] neuro   [] other: Patient Education: [x] Review HEP    [] Progressed/Changed HEP based on:   [] positioning   [] body mechanics   [] transfers   [] heat/ice application    [] other:      Other Objective/Functional Measures: See IE     Pain Level (0-10 scale) post treatment: 6/10    ASSESSMENT/Changes in Function: see POC.     Patient will continue to benefit from skilled PT services to modify and progress therapeutic interventions, address functional mobility deficits, address ROM deficits, address strength deficits, analyze and address soft tissue restrictions, analyze and cue movement patterns, analyze and modify body mechanics/ergonomics, assess and modify postural abnormalities, address imbalance/dizziness and instruct in home and community integration to attain remaining goals. [x]  See Plan of Care  []  See progress note/recertification  []  See Discharge Summary         Progress towards goals / Updated goals:  Short term goals to be accomplished in 2 weeks  1. The patient will demonstrate independence and compliance with HEP to maximize therapeutic benefit. 2. The patient will improve passive left knee flexion ROM to 10 degrees extension to improve ease of transfers. Long term goals to be accomplished in 4 weeks. 1. The patient will improve FOTO score to 50 to maximize quality of life. 2. The patient will demonstrate passive left knee extension ROM 5 to improve ease of ambulation. 3. The patient will demonstrate left knee flexion to 110 degrees flexion to improve ease of transfers. 4. The patient will demonstrate ambulation void of compensations and AD to improve ease of ambulation efficiency. PLAN  []  Upgrade activities as tolerated     [x]  Continue plan of care  []  Update interventions per flow sheet       []  Discharge due to:_  []  Other:_      Rubens Betancur, PT 1/27/2020  7:39 PM    No future appointments.

## 2020-01-28 NOTE — PROGRESS NOTES
[facility]  [address] [facility] [address]  [place], [address]  [phone] fax    Plan of Care/ Statement of Necessity for Physical Therapy Services    Patient name: Jillian Dumont Start of Care: 2020   Referral source: Cheli Sandoval MD : 1945    Medical Diagnosis: Pain in left knee [M25.562]  Payor: VA MEDICARE / Plan: VA MEDICARE PART A & B / Product Type: Medicare /  Onset Date: 2019    Treatment Diagnosis: :Left TKR   Prior Hospitalization: see medical history Provider#: [id number]   Medications: Verified on Patient summary List    Comorbidities: Diabetes, HTN, BMI > 30, right and left TKR, c section, 1 kidney removed   Prior Level of Function: pain with walking and transfers. The Plan of Care and following information is based on the information from the initial evaluation. Assessment/ key information: the patient is a 69year old female s/p left TKR performed on 2019. The patient states that her knee continues to be sore, but so does her right knee. She does report that she is able to get around better, but states she struggles most with extension. Her left knee ROM is 15 - 95 degrees. She is quite slow with transfers and continues to present with a lag upon SLR. The patient presents with impairments consistent with the diagnosis consisting of pain, decreased ROM, decreased flexibility, decreased strength, limited ambulation efficiency, and poor transfer efficiency. Te patient will benefit from skilled PT in order to address the aforementioned impairments.      Evaluation Complexity History MEDIUM  Complexity : 1-2 comorbidities / personal factors will impact the outcome/ POC ; Examination LOW Complexity : 1-2 Standardized tests and measures addressing body structure, function, activity limitation and / or participation in recreation  ;Presentation LOW Complexity : Stable, uncomplicated  ;Clinical Decision Making MEDIUM Complexity : FOTO score of 26-74  Overall Complexity Rating: MEDIUM  Problem List: pain affecting function, decrease ROM, decrease strength, edema affecting function, impaired gait/ balance, decrease ADL/ functional abilitiies, decrease activity tolerance, decrease flexibility/ joint mobility and decrease transfer abilities   Treatment Plan may include any combination of the following: Therapeutic exercise, Therapeutic activities, Neuromuscular re-education, Physical agent/modality, Gait/balance training, Manual therapy, Patient education, Self Care training, Functional mobility training, Home safety training and Stair training  Patient / Family readiness to learn indicated by: asking questions   Persons(s) to be included in education: patient (P)  Barriers to Learning/Limitations: None  Patient Goal (s): more mobility and range of motion.   Patient Self Reported Health Status: good  Rehabilitation Potential: good    Short term goals to be accomplished in 2 weeks  1. The patient will demonstrate independence and compliance with HEP to maximize therapeutic benefit. 2. The patient will improve passive left knee flexion ROM to 10 degrees extension to improve ease of transfers. Long term goals to be accomplished in 4 weeks. 1. The patient will improve FOTO score to 50 to maximize quality of life. 2. The patient will demonstrate passive left knee extension ROM 5 to improve ease of ambulation. 3. The patient will demonstrate left knee flexion to 110 degrees flexion to improve ease of transfers. 4. The patient will demonstrate ambulation void of compensations and AD to improve ease of ambulation efficiency. Frequency / Duration: Patient to be seen 2-3 times per week for 4 weeks.     Patient/ Caregiver education and instruction: Diagnosis, prognosis, self care, activity modification and exercises   [x]  Plan of care has been reviewed with PTA    Certification Period: 1/27/2020 - 2/25/2020  [facility], PT 1/27/2020 7:32 PM    ________________________________________________________________________    I certify that the above Therapy Services are being furnished while the patient is under my care. I agree with the treatment plan and certify that this therapy is necessary.     [address] [place] [address] Signature:____________________  Date:____________Time: _________    Please sign and return to [facility] [address] Khan [address] [place] [address] [address]  [phone] fax

## 2020-02-04 ENCOUNTER — HOSPITAL ENCOUNTER (OUTPATIENT)
Dept: PHYSICAL THERAPY | Age: 75
Discharge: HOME | End: 2020-02-04
Payer: MEDICARE

## 2020-02-04 PROCEDURE — 97140 MANUAL THERAPY 1/> REGIONS: CPT

## 2020-02-04 PROCEDURE — 97110 THERAPEUTIC EXERCISES: CPT

## 2020-02-04 NOTE — PROGRESS NOTES
PT DAILY TREATMENT NOTE 10-18    Patient Name: Juany Graves  Date:2020  : 1945  [x]  Patient  Verified  Payor: [place] MEDICARE / Plan: [place] MEDICARE PART A & B / Product Type: Medicare /    In time:6:01  Out time:6:41  Total Treatment Time (min): 40  Visit #: 2 of     Medicare/BCBS Only   Total Timed Codes (min):  40 1:1 Treatment Time:  40       Treatment Area: Pain in left knee [M25.562]    SUBJECTIVE  Pain Level (0-10 scale): 0/10  Any medication changes, allergies to medications, adverse drug reactions, diagnosis change, or new procedure performed?: [x] No    [] Yes (see summary sheet for update)  Subjective functional status/changes:   [] No changes reported  \"No pain, just pulling. \"    OBJECTIVE    32 min Therapeutic Exercise:  [x] See flow sheet :   Rationale: increase ROM, increase strength and increase proprioception to improve the patients ability to perform ADL's.    8 min Manual Therapy:  Left patellar mobs. Scar massage. Tibiofemoral mobs to promote knee flexion. Left knee PROM. Pt supine. Rationale: decrease pain, increase ROM and increase tissue extensibility to improve ease of ambulation and performing sit to stands. With   [x] TE   [] TA   [] neuro   [] other: Patient Education: [x] Review HEP    [] Progressed/Changed HEP based on:   [] positioning   [] body mechanics   [] transfers   [] heat/ice application    [] other:      Other Objective/Functional Measures: Initiated exercises per flow sheet. PD ice post-treatment. Pain Level (0-10 scale) post treatment: 0/10    ASSESSMENT/Changes in Function: First F/U visit. Limited Left knee flexion ROM. Pt fully participated in treatment and is motivated. Continue PT to increase knee ROM and strength.     Patient will continue to benefit from skilled PT services to modify and progress therapeutic interventions, address functional mobility deficits, address ROM deficits, address strength deficits, analyze and address soft tissue restrictions and analyze and cue movement patterns to attain remaining goals. [x]  See Plan of Care  []  See progress note/recertification  []  See Discharge Summary         Progress towards goals / Updated goals:  Short term goals to be accomplished in 2 weeks  1. The patient will demonstrate independence and compliance with HEP to maximize therapeutic benefit. Current: MEt, pt reports HEP compliance. 2/4/2020  2. The patient will improve passive left knee flexion ROM to 10 degrees extension to improve ease of transfers. Long term goals to be accomplished in 4 weeks. 1. The patient will improve FOTO score to 50 to maximize quality of life. 2. The patient will demonstrate passive left knee extension ROM 5 to improve ease of ambulation. 3. The patient will demonstrate left knee flexion to 110 degrees flexion to improve ease of transfers. 4. The patient will demonstrate ambulation void of compensations and AD to improve ease of ambulation efficiency.     PLAN  []  Upgrade activities as tolerated     [x]  Continue plan of care  []  Update interventions per flow sheet       []  Discharge due to:_  []  Other:_      Martínez Gonzalez PTA 2/4/2020  6:05 PM    Future Appointments   Date Time Provider Shubham Wayne   2/6/2020  6:00 PM Sal Lewis PTA [facility]   2/11/2020  6:00 PM Brady Gaona PTA [facility]   2/13/2020  6:00 PM Sal Lewis PTA [facility]   2/18/2020  6:00 PM Orion Achilles [facility]   2/20/2020  6:00 PM Orion Achilles [facility]   2/25/2020  6:00 PM Orion Achilles [facility]

## 2020-02-06 ENCOUNTER — HOSPITAL ENCOUNTER (OUTPATIENT)
Dept: PHYSICAL THERAPY | Age: 75
Discharge: HOME | End: 2020-02-06
Payer: MEDICARE

## 2020-02-06 PROCEDURE — 97140 MANUAL THERAPY 1/> REGIONS: CPT

## 2020-02-06 PROCEDURE — 97110 THERAPEUTIC EXERCISES: CPT

## 2020-02-06 NOTE — PROGRESS NOTES
PT DAILY TREATMENT NOTE 10-18    Patient Name: Bruno Proper  Date:2020  : 1945  [x]  Patient  Verified  Payor: [place] MEDICARE / Plan: [place] MEDICARE PART A & B / Product Type: Medicare /    In time:6:00  Out time:6:53  Total Treatment Time (min): 48  Visit #: 3 of     Medicare/BCBS Only   Total Timed Codes (min):  43 1:1 Treatment Time:  36       Treatment Area: Pain in left knee [M25.562]    SUBJECTIVE  Pain Level (0-10 scale): 6  Any medication changes, allergies to medications, adverse drug reactions, diagnosis change, or new procedure performed?: [x] No    [] Yes (see summary sheet for update)  Subjective functional status/changes:   [] No changes reported  Pt reports \"my knee is hurting me today\" Pt also states her right knee is hurting her more than her left knee. OBJECTIVE    Modality rationale: decrease edema, decrease inflammation and decrease pain to improve the patients ability to perform functional task with ease.    Min Type Additional Details    [] Estim:  []Unatt       []IFC  []Premod                        []Other:  []w/ice   []w/heat  Position:  Location:    [] Estim: []Att    []TENS instruct  []NMES                    []Other:  []w/US   []w/ice   []w/heat  Position:  Location:    []  Traction: [] Cervical       []Lumbar                       [] Prone          []Supine                       []Intermittent   []Continuous Lbs:  [] before manual  [] after manual    []  Ultrasound: []Continuous   [] Pulsed                           []1MHz   []3MHz W/cm2:  Location:    []  Iontophoresis with dexamethasone         Location: [] Take home patch   [] In clinic   10 [x]  Ice     []  heat  []  Ice massage  []  Laser   []  Anodyne Position: longist  Location: left knee    []  Laser with stim  []  Other:  Position:  Location:    []  Vasopneumatic Device Pressure:       [] lo [] med [] hi   Temperature: [] lo [] med [] hi   [x] Skin assessment post-treatment:  [x]intact []redness- no adverse reaction    []redness - adverse reaction:       35 min Therapeutic Exercise:  [x] See flow sheet :   Rationale: increase ROM and increase strength to improve the patients ability to perform ADL's with ease. 8 min Manual Therapy:   Left patellar mobs. Scar massage. Tibiofemoral mobs to promote knee flexion grades I-II. Left knee PROM. Pt supine. Rationale: decrease pain, increase ROM, increase tissue extensibility and decrease edema  to improve functional mobility. With   [] TE   [] TA   [] neuro   [] other: Patient Education: [x] Review HEP    [] Progressed/Changed HEP based on:   [] positioning   [] body mechanics   [] transfers   [] heat/ice application    [] other:      Other Objective/Functional Measures: some standing exercises held due to increasing pain. Pain Level (0-10 scale) post treatment: 4    ASSESSMENT/Changes in Function:   Therex modified due to c/o increasing pain with WB exercises this visit. Inflammation in the left knee noted with pt demo'ing decreased mobility due to pain. Pt reports some increased pain post treatment but left in no apparent distress. Continued treatment per pt tolerance to improve left knee ROM/mobility and aid with ease of ADL's. Patient will continue to benefit from skilled PT services to modify and progress therapeutic interventions, address functional mobility deficits, address ROM deficits, address strength deficits, analyze and address soft tissue restrictions, analyze and cue movement patterns, analyze and modify body mechanics/ergonomics and assess and modify postural abnormalities to attain remaining goals. [x]  See Plan of Care  []  See progress note/recertification  []  See Discharge Summaryd       Progress towards goals / Updated goals:  Short term goals to be accomplished in 2 weeks  1. The patient will demonstrate independence and compliance with HEP to maximize therapeutic benefit. Current: MEt, pt reports HEP compliance. 2/4/2020  2. The patient will improve passive left knee flexion ROM to 10 degrees extension to improve ease of transfers. Long term goals to be accomplished in 4 weeks. 1. The patient will improve FOTO score to 50 to maximize quality of life. 2. The patient will demonstrate passive left knee extension ROM 5 to improve ease of ambulation. 3. The patient will demonstrate left knee flexion to 110 degrees flexion to improve ease of transfers. 4. The patient will demonstrate ambulation void of compensations and AD to improve ease of ambulation efficiency.     PLAN  []  Upgrade activities as tolerated     [x]  Continue plan of care  []  Update interventions per flow sheet       []  Discharge due to:_  []  Other:_      Albert Fernandez PTA 2/6/2020  6:13 PM    Future Appointments   Date Time Provider Shubham Wayne   2/11/2020  6:00 PM Jeferson Pearson [place] [facility]   2/13/2020  6:00 PM Denita Barros PTA [facility]   2/18/2020  6:00 PM Adilia Spurtommy [facility]   2/20/2020  6:00 PM Guayanilla Spurling [facility]   2/25/2020  6:00 PM Adilia Spurling [facility]

## 2020-02-11 ENCOUNTER — HOSPITAL ENCOUNTER (OUTPATIENT)
Dept: PHYSICAL THERAPY | Age: 75
Discharge: HOME | End: 2020-02-11
Payer: MEDICARE

## 2020-02-11 PROCEDURE — 97140 MANUAL THERAPY 1/> REGIONS: CPT

## 2020-02-11 PROCEDURE — 97110 THERAPEUTIC EXERCISES: CPT

## 2020-02-11 NOTE — PROGRESS NOTES
PT DAILY TREATMENT NOTE 10-18    Patient Name: Preet Mcdaniels  Date:2020  : 1945  [x]  Patient  Verified  Payor: [place] MEDICARE / Plan: [place] MEDICARE PART A & B / Product Type: Medicare /    In time: 5:59   Out time: 6:52   Total Treatment Time (min): 53  Visit #: 4 of     Medicare/BCBS Only   Total Timed Codes (min): 43 1:1 Treatment Time: 29       Treatment Area: Pain in left knee [M25.562]    SUBJECTIVE  Pain Level (0-10 scale): 5  Any medication changes, allergies to medications, adverse drug reactions, diagnosis change, or new procedure performed?: [x] No    [] Yes (see summary sheet for update)  Subjective functional status/changes:   [] No changes reported  Pt continues to report elevated pain levels in the left knee. OBJECTIVE    Modality rationale: decrease edema, decrease inflammation and decrease pain to improve the patients ability to perform functional tasks.    Min Type Additional Details    [] Estim:  []Unatt       []IFC  []Premod                        []Other:  []w/ice   []w/heat  Position:  Location:    [] Estim: []Att    []TENS instruct  []NMES                    []Other:  []w/US   []w/ice   []w/heat  Position:  Location:    []  Traction: [] Cervical       []Lumbar                       [] Prone          []Supine                       []Intermittent   []Continuous Lbs:  [] before manual  [] after manual    []  Ultrasound: []Continuous   [] Pulsed                           []1MHz   []3MHz W/cm2:  Location:    []  Iontophoresis with dexamethasone         Location: [] Take home patch   [] In clinic   10 [x]  Ice     []  heat  []  Ice massage  []  Laser   []  Anodyne Position: reclined  Location: left knee    []  Laser with stim  []  Other:  Position:  Location:    []  Vasopneumatic Device Pressure:       [] lo [] med [] hi   Temperature: [] lo [] med [] hi   [x] Skin assessment post-treatment:  [x]intact []redness- no adverse reaction    []redness - adverse reaction:     31 min Therapeutic Exercise:  [x] See flow sheet :   Rationale: increase ROM and increase strength to improve the patients ability to perform ADLs with ease. 12 min Manual Therapy: in supine: STM around the left knee (with knee flexed and EXT) for swelling, left tibial anterior grade 1-2 mobs with slight ER with posterior femur mobs at end range knee EXT, DTM left popliteus, left posterior tibial grade 1-2 mobs at end range knee flex, left scar massage, PROM left knee flex/EXT after performing above   Rationale: decrease pain, increase ROM, increase tissue extensibility and decrease edema  to improve functional mobility. With   [] TE   [] TA   [] neuro   [] other: Patient Education: [x] Review HEP    [] Progressed/Changed HEP based on:   [] positioning   [] body mechanics   [] transfers   [] heat/ice application    [] other:      Other Objective/Functional Measures: no discoloration noted around the left knee with visual observation. No increased redness and mild increased warmth noted to palpation around the left knee. No pain reported to deep palpation to the left calf. Non-pitting edema noted around the left knee and pt reported that this swelling is improving. Pain Level (0-10 scale) post treatment: 3    ASSESSMENT/Changes in Function: Reported improvement in pain post session today. Educated pt to continuing performing HEP exercises and ambulating around the home as tolerated to improve ROM and mobility. Pt is limited by pain and limited ROM of the left knee. Pt continues to have elevated swelling/inflammation around the left knee and educated pt on symptoms of DVT/infection (no signs of DVT/infection noted today in the clinic). We will plan on continuing therapy to improve the pt's current deficits and improve mobility. Continue POC as tolerated.      Patient will continue to benefit from skilled PT services to modify and progress therapeutic interventions, address functional mobility deficits, address ROM deficits, address strength deficits, analyze and address soft tissue restrictions, analyze and cue movement patterns, analyze and modify body mechanics/ergonomics and assess and modify postural abnormalities to attain remaining goals. []  See Plan of Care  []  See progress note/recertification  []  See Discharge Summary       Progress towards goals / Updated goals:  Short term goals to be accomplished in 2 weeks  1. The patient will demonstrate independence and compliance with HEP to maximize therapeutic benefit. Current: MEt, pt reports HEP compliance. 2/4/2020  2. The patient will improve passive left knee flexion ROM to 10 degrees extension to improve ease of transfers. Long term goals to be accomplished in 4 weeks. 1. The patient will improve FOTO score to 50 to maximize quality of life. 2. The patient will demonstrate passive left knee extension ROM 5 to improve ease of ambulation. 3. The patient will demonstrate left knee flexion to 110 degrees flexion to improve ease of transfers. Limited left knee flex AROM noted with exercises today 2/11/2020  4. The patient will demonstrate ambulation void of compensations and AD to improve ease of ambulation efficiency.     PLAN  [x]  Upgrade activities as tolerated     [x]  Continue plan of care  [x]  Update interventions per flow sheet       []  Discharge due to:_  []  Other:_      Richa Gao, PT 2/11/2020  6:13 PM    Future Appointments   Date Time Provider Shubham Wayne   2/11/2020  6:00 PM [facility]   2/13/2020  6:00 PM Pelon Irwin, PTA [facility]   2/18/2020  6:00 PM Jessica Armstrong [facility]   2/20/2020  6:00 PM Jessicajah Armstrong [facility]   2/25/2020  6:00 PM Jessica Korywl [facility]

## 2020-02-13 ENCOUNTER — HOSPITAL ENCOUNTER (OUTPATIENT)
Dept: PHYSICAL THERAPY | Age: 75
Discharge: HOME | End: 2020-02-13
Payer: MEDICARE

## 2020-02-13 PROCEDURE — 97110 THERAPEUTIC EXERCISES: CPT

## 2020-02-13 PROCEDURE — 97140 MANUAL THERAPY 1/> REGIONS: CPT

## 2020-02-13 NOTE — PROGRESS NOTES
PT DAILY TREATMENT NOTE 10-18    Patient Name: Sin Oh  Date:2020  : 1945  [x]  Patient  Verified  Payor: [place] MEDICARE / Plan: [place] MEDICARE PART A & B / Product Type: Medicare /    In time:5:52  Out time:6:49  Total Treatment Time (min): 62  Visit #: 5 of     Medicare/BCBS Only   Total Timed Codes (min):  47 1:1 Treatment Time:  41       Treatment Area: Pain in left knee [M25.562]    SUBJECTIVE  Pain Level (0-10 scale): 5  Any medication changes, allergies to medications, adverse drug reactions, diagnosis change, or new procedure performed?: [x] No    [] Yes (see summary sheet for update)  Subjective functional status/changes:   [] No changes reported  Pt reports her left knee is doing good but its her right knee that is in pain and limiting her walking. OBJECTIVE    Modality rationale: decrease inflammation and decrease pain to improve the patients ability to tolerate ADL's.    Min Type Additional Details    [] Estim:  []Unatt       []IFC  []Premod                        []Other:  []w/ice   []w/heat  Position:  Location:    [] Estim: []Att    []TENS instruct  []NMES                    []Other:  []w/US   []w/ice   []w/heat  Position:  Location:    []  Traction: [] Cervical       []Lumbar                       [] Prone          []Supine                       []Intermittent   []Continuous Lbs:  [] before manual  [] after manual    []  Ultrasound: []Continuous   [] Pulsed                           []1MHz   []3MHz W/cm2:  Location:    []  Iontophoresis with dexamethasone         Location: [] Take home patch   [] In clinic   10 [x]  Ice     []  heat  []  Ice massage  []  Laser   []  Anodyne Position:reclined  Location: B knee    []  Laser with stim  []  Other:  Position:  Location:    []  Vasopneumatic Device Pressure:       [] lo [] med [] hi   Temperature: [] lo [] med [] hi   [] Skin assessment post-treatment:  []intact []redness- no adverse reaction    []redness - adverse reaction: 37 min Therapeutic Exercise:  [x] See flow sheet :   Rationale: increase ROM and increase strength to improve the patients ability to perform functional task with ease. 10 min Manual Therapy:  Left patellar mobs grades I-II. Scar massage. Tibiofemoral mobs to promote knee flexion grades II-III. Left knee PROM. Pt supine. Rationale: decrease pain, increase ROM and increase tissue extensibility to improve functional mobility with ADL's. With   [] TE   [] TA   [] neuro   [] other: Patient Education: [x] Review HEP    [] Progressed/Changed HEP based on:   [] positioning   [] body mechanics   [] transfers   [] heat/ice application    [] other:      Other Objective/Functional Measures: PROM left knee flex 90 degrees     Pain Level (0-10 scale) post treatment: 2    ASSESSMENT/Changes in Function:  Pt displays mobility limitations due to increased pain in the right knee with WB. Some continued pain in the left knee but pt reports it is of less intensity than her right knee. Improved left knee extension noted this visit. Continue to improve left knee ROM and mobility per pt tolerance to aid with ease of ADL's. Patient will continue to benefit from skilled PT services to modify and progress therapeutic interventions, address functional mobility deficits, address ROM deficits, address strength deficits, analyze and address soft tissue restrictions, analyze and cue movement patterns, analyze and modify body mechanics/ergonomics and assess and modify postural abnormalities to attain remaining goals. [x]  See Plan of Care  []  See progress note/recertification  []  See Discharge Summary         Progress towards goals / Updated goals:  Short term goals to be accomplished in 2 weeks  1. The patient will demonstrate independence and compliance with HEP to maximize therapeutic benefit.   Current: MEt, pt reports HEP compliance. 2/4/2020  2.  The patient will improve passive left knee flexion ROM to 10 degrees extension to improve ease of transfers. Long term goals to be accomplished in 4 weeks. 1. The patient will improve FOTO score to 50 to maximize quality of life. 2. The patient will demonstrate passive left knee extension ROM 5 to improve ease of ambulation. Current: Met 2/13/2020 left knee extension ROM 5 degrees  3. The patient will demonstrate left knee flexion to 110 degrees flexion to improve ease of transfers. Current: Limited left knee flex AROM noted with exercises today 2/11/2020  4.  The patient will demonstrate ambulation void of compensations and AD to improve ease of ambulation efficiency.       PLAN  []  Upgrade activities as tolerated     [x]  Continue plan of care  []  Update interventions per flow sheet       []  Discharge due to:_  []  Other:_      Shaun Reno PTA 2/13/2020  5:12 PM    Future Appointments   Date Time Provider Shubham Wayne   2/13/2020  6:00 PM Radha Iniguez PTA [facility]   2/18/2020  6:00 PM Vaishnavi Faster [facility]   2/20/2020  6:00 PM Vaishnavi Faster [facility]   2/25/2020  6:00 PM Vaishnavi Faster [facility]

## 2020-02-18 ENCOUNTER — HOSPITAL ENCOUNTER (OUTPATIENT)
Dept: PHYSICAL THERAPY | Age: 75
Discharge: HOME | End: 2020-02-18
Payer: MEDICARE

## 2020-02-18 PROCEDURE — 97110 THERAPEUTIC EXERCISES: CPT

## 2020-02-18 PROCEDURE — 97140 MANUAL THERAPY 1/> REGIONS: CPT

## 2020-02-18 NOTE — PROGRESS NOTES
PT DAILY TREATMENT NOTE 10-18    Patient Name: Stephany Green  Date:2020  : 1945  [x]  Patient  Verified  Payor: [place] MEDICARE / Plan: [place] MEDICARE PART A & B / Product Type: Medicare /    In time:5:59pm  Out time:6:41pm  Total Treatment Time (min): 42  Visit #: 6 of 8    Medicare/BCBS Only   Total Timed Codes (min):  32 1:1 Treatment Time:  32       Treatment Area: Pain in left knee [M25.562]    SUBJECTIVE  Pain Level (0-10 scale): 7/10  Any medication changes, allergies to medications, adverse drug reactions, diagnosis change, or new procedure performed?: [x] No    [] Yes (see summary sheet for update)  Subjective functional status/changes:   [] No changes reported  Pt reports her knee pain still limits her. OBJECTIVE    22 min Therapeutic Exercise:  [x] See flow sheet :   Rationale: increase ROM and increase strength to improve the patients ability to improve ease of daily activity. 10 min Manual Therapy:  Left patellar mobs grade I-II, scar massage, tibiofemoral mobs grade 2-3 to improve knee flexion and extension, left knee PROM with end range gentle stretch into flex and ext   Rationale: decrease pain, increase ROM and increase tissue extensibility to improve ease of daily activity. Modality rationale: decrease inflammation and decrease pain to improve the patients ability to improve ease of daily activity.    Min Type Additional Details    [] Estim:  []Unatt       []IFC  []Premod                        []Other:  []w/ice   []w/heat  Position:  Location:    [] Estim: []Att    []TENS instruct  []NMES                    []Other:  []w/US   []w/ice   []w/heat  Position:  Location:    []  Traction: [] Cervical       []Lumbar                       [] Prone          []Supine                       []Intermittent   []Continuous Lbs:  [] before manual  [] after manual    []  Ultrasound: []Continuous   [] Pulsed                           []1MHz   []3MHz Location:  W/cm2:    []  Iontophoresis with dexamethasone         Location: [] Take home patch   [] In clinic   10 [x]  Ice     []  heat  []  Ice massage  []  Laser   []  Anodyne Position: supine with wedge  Location: left knee    []  Laser with stim  []  Other: Position:  Location:    []  Vasopneumatic Device Pressure:       [] lo [] med [] hi   Temperature: [] lo [] med [] hi   [] Skin assessment post-treatment:  []intact []redness- no adverse reaction    []redness - adverse reaction:           With   [] TE   [] TA   [] neuro   [] other: Patient Education: [x] Review HEP    [] Progressed/Changed HEP based on:   [] positioning   [] body mechanics   [] transfers   [] heat/ice application    [] other:      Other Objective/Functional Measures: left knee AROM/PROM: 6-8/5-89 with firm end feel     Pain Level (0-10 scale) post treatment: 4/10    ASSESSMENT/Changes in Function: Pt remains very limited with regards to left knee ROM with firm end feels. Continue emphasis on knee mobility. Patient will continue to benefit from skilled PT services to modify and progress therapeutic interventions, address functional mobility deficits, address ROM deficits, address strength deficits, analyze and address soft tissue restrictions, analyze and cue movement patterns, analyze and modify body mechanics/ergonomics and instruct in home and community integration to attain remaining goals. []  See Plan of Care  []  See progress note/recertification  []  See Discharge Summary         Progress towards goals / Updated goals:  Short term goals to be accomplished in 2 weeks  1. The patient will demonstrate independence and compliance with HEP to maximize therapeutic benefit.   Current: MEt, pt reports HEP compliance. 2/4/2020  2. The patient will improve passive left knee flexion ROM to 10 degrees extension to improve ease of transfers. Long term goals to be accomplished in 4 weeks. 1. The patient will improve FOTO score to 50 to maximize quality of life.   2. The patient will demonstrate passive left knee extension ROM 5 to improve ease of ambulation. Current: Met 2/13/2020 left knee extension ROM 5 degrees  3. The patient will demonstrate left knee flexion to 110 degrees flexion to improve ease of transfers.   Current: 86 degrees 2/18/2020  4. The patient will demonstrate ambulation void of compensations and AD to improve ease of ambulation efficiency.     PLAN  []  Upgrade activities as tolerated     [x]  Continue plan of care  []  Update interventions per flow sheet       []  Discharge due to:_  []  Other:_      Leslye Schulz, PT, DPT, ATC 2/18/2020  6:19 PM    Future Appointments   Date Time Provider Shubham Wayne   2/20/2020  6:00 PM Dominic Sheffield [facility]   2/25/2020  6:00 PM Orion Li [facility]

## 2020-02-20 ENCOUNTER — HOSPITAL ENCOUNTER (OUTPATIENT)
Dept: PHYSICAL THERAPY | Age: 75
Discharge: HOME | End: 2020-02-20
Payer: MEDICARE

## 2020-02-20 PROCEDURE — 97140 MANUAL THERAPY 1/> REGIONS: CPT

## 2020-02-20 PROCEDURE — 97110 THERAPEUTIC EXERCISES: CPT

## 2020-02-20 NOTE — PROGRESS NOTES
PT DAILY TREATMENT NOTE 10-18    Patient Name: Preet Mcdaniels  Date:2020  : 1945  [x]  Patient  Verified  Payor: [place] MEDICARE / Plan: [place] MEDICARE PART A & B / Product Type: Medicare /    In time:4:45  Out time:5:29  Total Treatment Time (min): 44  Visit #: 7 of 8    Medicare/BCBS Only   Total Timed Codes (min):  44 1:1 Treatment Time:  34       Treatment Area: Pain in left knee [M25.562]    SUBJECTIVE  Pain Level (0-10 scale): 6/10  Any medication changes, allergies to medications, adverse drug reactions, diagnosis change, or new procedure performed?: [x] No    [] Yes (see summary sheet for update)  Subjective functional status/changes:   [] No changes reported  The patient states that her left knee is doing quite well, but that her chief complaint is her right knee. OBJECTIVE    Modality rationale:  The patient declined modality post session   Min Type Additional Details    [] Estim:  []Unatt       []IFC  []Premod                        []Other:  []w/ice   []w/heat  Position:  Location:    [] Estim: []Att    []TENS instruct  []NMES                    []Other:  []w/US   []w/ice   []w/heat  Position:  Location:    []  Traction: [] Cervical       []Lumbar                       [] Prone          []Supine                       []Intermittent   []Continuous Lbs:  [] before manual  [] after manual    []  Ultrasound: []Continuous   [] Pulsed                           []1MHz   []3MHz W/cm2:  Location:    []  Iontophoresis with dexamethasone         Location: [] Take home patch   [] In clinic    []  Ice     []  heat  []  Ice massage  []  Laser   []  Anodyne Position:  Location:    []  Laser with stim  []  Other:  Position:  Location:    []  Vasopneumatic Device Pressure:       [] lo [] med [] hi   Temperature: [] lo [] med [] hi   [] Skin assessment post-treatment:  []intact []redness- no adverse reaction    []redness - adverse reaction:     34 min Therapeutic Exercise:  [] See flow sheet :   Rationale: increase ROM and increase strength to improve the patients ability to improve ADL ease. 10 min Manual Therapy:  Tibiofemoral mobs flexion/extension with patient in supine grade III   Rationale: decrease pain, increase ROM and increase tissue extensibility to improve ADL ease. With   [] TE   [] TA   [] neuro   [] other: Patient Education: [x] Review HEP    [] Progressed/Changed HEP based on:   [] positioning   [] body mechanics   [] transfers   [] heat/ice application    [] other:      Other Objective/Functional Measures:      Pain Level (0-10 scale) post treatment: 0/10 left knee    ASSESSMENT/Changes in Function: The patient does continue to demonstrate limitation of passive mobility concerning her left knee, though passively it has surpassed her right TKR outcome and does give her significantly less pain. Patient will continue to benefit from skilled PT services to modify and progress therapeutic interventions, address functional mobility deficits, address ROM deficits, address strength deficits, analyze and address soft tissue restrictions, analyze and cue movement patterns, analyze and modify body mechanics/ergonomics, assess and modify postural abnormalities and instruct in home and community integration to attain remaining goals. []  See Plan of Care  []  See progress note/recertification  []  See Discharge Summary         Progress towards goals / Updated goals:  Short term goals to be accomplished in 2 weeks  1. The patient will demonstrate independence and compliance with HEP to maximize therapeutic benefit.   Current: MEt, pt reports HEP compliance. 2/4/2020  2. The patient will improve passive left knee flexion ROM to 10 degrees extension to improve ease of transfers. Long term goals to be accomplished in 4 weeks. 1. The patient will improve FOTO score to 50 to maximize quality of life.   2. The patient will demonstrate passive left knee extension ROM 5 to improve ease of ambulation. Current: Met 2/13/2020 left knee extension ROM 5 degrees  3. The patient will demonstrate left knee flexion to 110 degrees flexion to improve ease of transfers.   Current: 86 degrees 2/18/2020  4.  The patient will demonstrate ambulation void of compensations and AD to improve ease of ambulation efficiency.       PLAN  []  Upgrade activities as tolerated     [x]  Continue plan of care  []  Update interventions per flow sheet       []  Discharge due to:_  []  Other:_      Tiarra Shoemaker PT 2/20/2020  6:00 PM    Future Appointments   Date Time Provider Shubham Wayne   2/25/2020  6:00 PM [facility]

## 2020-02-25 ENCOUNTER — HOSPITAL ENCOUNTER (OUTPATIENT)
Dept: PHYSICAL THERAPY | Age: 75
Discharge: HOME | End: 2020-02-25
Payer: MEDICARE

## 2020-02-25 PROCEDURE — 97140 MANUAL THERAPY 1/> REGIONS: CPT

## 2020-02-25 PROCEDURE — 97110 THERAPEUTIC EXERCISES: CPT

## 2020-02-25 NOTE — PROGRESS NOTES
PT DAILY TREATMENT NOTE 10-18    Patient Name: Yumiko Conrad  VLWB:  : 1945  [x]  Patient  Verified  Payor: VA MEDICARE / Plan: VA MEDICARE PART A & B / Product Type: Medicare /    In time:5:55pm  Out time:6:42pm  Total Treatment Time (min): 52  Visit #: 8 of 8     Medicare/BCBS Only   Total Timed Codes (min):  37 1:1 Treatment Time:  30       Treatment Area: Pain in left knee [M25.562]    SUBJECTIVE  Pain Level (0-10 scale): 5/10  Any medication changes, allergies to medications, adverse drug reactions, diagnosis change, or new procedure performed?: [x] No    [] Yes (see summary sheet for update)  Subjective functional status/changes:   [] No changes reported  Pt reports her left knee still bothers her, but reports her right knee is bothering her more recently. OBJECTIVE  27 min Therapeutic Exercise:  [x] See flow sheet :   Rationale: increase ROM and increase strength to improve the patients ability to improve ease of daily activity. 10 min Manual Therapy:  Tibiofemoral mobs flexion/extension grade 2-3, gentle end range knee flexion and extension stretch   Rationale: decrease pain, increase ROM and increase tissue extensibility to improve ease of functional mobility. Modality rationale: decrease pain to improve the patients ability to improve ease of daily activity.    Min Type Additional Details    [] Estim:  []Unatt       []IFC  []Premod                        []Other:  []w/ice   []w/heat  Position:  Location:    [] Estim: []Att    []TENS instruct  []NMES                    []Other:  []w/US   []w/ice   []w/heat  Position:  Location:    []  Traction: [] Cervical       []Lumbar                       [] Prone          []Supine                       []Intermittent   []Continuous Lbs:  [] before manual  [] after manual    []  Ultrasound: []Continuous   [] Pulsed                           []1MHz   []3MHz Location:  W/cm2:    []  Iontophoresis with dexamethasone         Location: [] Take home patch   [] In clinic   10 [x]  Ice     []  heat  []  Ice massage  []  Laser   []  Anodyne Position: seated  Location: left knee    []  Laser with stim  []  Other: Position:  Location:    []  Vasopneumatic Device Pressure:       [] lo [] med [] hi   Temperature: [] lo [] med [] hi   [] Skin assessment post-treatment:  []intact []redness- no adverse reaction    []redness - adverse reaction:         With   [] TE   [] TA   [] neuro   [] other: Patient Education: [x] Review HEP    [] Progressed/Changed HEP based on:   [] positioning   [] body mechanics   [] transfers   [] heat/ice application    [] other:      Other Objective/Functional Measures:     Pain Level (0-10 scale) post treatment: 4/10    ASSESSMENT/Changes in Function: Pt continues to demonstrate limited knee flexion and extension ROM, but has improved since start of care. She has noted increased pain in her right knee that seems to limit her more functionally of late. She will continue to benefit from skilled PT to address her impairments. Patient will continue to benefit from skilled PT services to modify and progress therapeutic interventions, address functional mobility deficits, address ROM deficits, address strength deficits, analyze and address soft tissue restrictions, analyze and cue movement patterns and instruct in home and community integration to attain remaining goals. []  See Plan of Care  []  See progress note/recertification  []  See Discharge Summary         Progress towards goals / Updated goals:  Short term goals to be accomplished in 2 weeks  1. The patient will demonstrate independence and compliance with HEP to maximize therapeutic benefit.   Current: MEt, pt reports HEP compliance. 2/4/2020  2. The patient will improve passive left knee flexion ROM to 10 degrees extension to improve ease of transfers. Long term goals to be accomplished in 4 weeks.   1. The patient will improve FOTO score to 50 to maximize quality of life.  Current: will assess NV 2/25/2020  2. The patient will demonstrate passive left knee extension ROM 5 to improve ease of ambulation. Current: Met 2/13/2020 left knee extension ROM 5 degrees  3. The patient will demonstrate left knee flexion to 110 degrees flexion to improve ease of transfers.   Current: 86 degrees 2/18/2020  4. The patient will demonstrate ambulation void of compensations and AD to improve ease of ambulation efficiency.   Current: Not met, continued to ambulate with mild antalgic gait pattern 2/25/2020    PLAN  []  Upgrade activities as tolerated     [x]  Continue plan of care  []  Update interventions per flow sheet       []  Discharge due to:_  []  Other:_      Olive Renee, PT, DPT, ATC 2/25/2020  6:53 PM    Future Appointments   Date Time Provider Shubham Wayne   3/3/2020  6:00 PM Mendel Sprain, [place] [facility]   3/5/2020  6:00 PM Chloé Vergara [facility]   3/10/2020  6:00 PM Mendel Sprain, PTA [facility]   3/12/2020  6:00 PM Libby Blantonh, PTA [facility]   3/17/2020  6:00 PM Mendel Sprain, PTA [facility]   3/19/2020  6:00 PM Libby Blantonh, PTA [facility]   3/24/2020  6:00 PM Mendel Sprain, PTA [facility]   3/26/2020  6:00 PM Arnoldo WILLIS PT [facility]

## 2020-02-25 NOTE — PROGRESS NOTES
[facility]  [address]  [place], [address]  [phone] fax    Physical Therapy Progress Note  Patient name: Melvin Ruvalcaba Start of Care: 2020   Referral source: Enid Quiroz MD : 1945   Medical/Treatment Diagnosis: Pain in left knee [M25.562]  Payor: VA MEDICARE / Plan: VA MEDICARE PART A & B / Product Type: Medicare /  Onset Date:2019     Prior Hospitalization: see medical history Provider#: [id number]   Medications: Verified on Patient Summary List     Comorbidities: Diabetes, HTN, BMI > 30, right and left TKR, c section, 1 kidney removed   Prior Level of Function: pain with walking and transfers. Visits from Start of Care: 8    Missed Visits: 0    Key Functional Changes: left knee extension ROM 5 degrees, left knee flexion 86 degrees    Updated Goals: to be achieved in 4 weeks:  1. The patient will improve FOTO score to 50 to maximize quality of life. PN: will assess NV 2020  2. The patient will demonstrate passive left knee extension ROM 5 to improve ease of ambulation. PN: Met 2020 left knee extension ROM 5 degrees  3. The patient will demonstrate left knee flexion to 110 degrees flexion to improve ease of transfers.   PN: 86 degrees 2020  4. The patient will demonstrate ambulation void of compensations and AD to improve ease of ambulation efficiency. PN: Not met, continued to ambulate with mild antalgic gait pattern 2020    ASSESSMENT/RECOMMENDATIONS: Pt continues to demonstrate limited knee flexion and extension ROM, but has improved since start of care. She has noted increased pain in her right knee that seems to limit her more functionally of late.  She will continue to benefit from skilled PT to address her impairments.     [x]Continue therapy per initial plan/protocol at a frequency of  2 x per week for 4 weeks  []Continue therapy with the following recommended changes:_____________________      _____________________________________________________________________  []Discontinue therapy progressing towards or have reached established goals  []Discontinue therapy due to lack of appreciable progress towards goals  []Discontinue therapy due to lack of attendance or compliance  []Await Physician's recommendations/decisions regarding therapy  []Other:________________________________________________________________    Thank you for this referral.    Sneha Viramontes, PT, DPT, ATC 2/25/2020 6:59 PM  NOTE TO PHYSICIAN:  [address]   FAX TO [facility]: [phone]  If you are unable to process this request in 24 hours please contact our office: [phone]    ? I have read the above report and request that my patient continue as recommended. ? I have read the above report and request that my patient continue therapy with the following changes/special instructions:__________________________________________________________  ? I have read the above report and request that my patient be discharged from therapy.     Physicians signature: ______________________________Date: ______Time:______

## 2020-03-03 ENCOUNTER — HOSPITAL ENCOUNTER (OUTPATIENT)
Dept: PHYSICAL THERAPY | Age: 75
Discharge: HOME | End: 2020-03-03
Payer: MEDICARE

## 2020-03-03 PROCEDURE — 97110 THERAPEUTIC EXERCISES: CPT

## 2020-03-03 PROCEDURE — 97140 MANUAL THERAPY 1/> REGIONS: CPT

## 2020-03-03 NOTE — PROGRESS NOTES
PT DAILY TREATMENT NOTE 10-18    Patient Name: Cayden Stahl  Date:3/3/2020  : 1945  [x]  Patient  Verified  Payor: VA MEDICARE / Plan: VA MEDICARE PART A & B / Product Type: Medicare /    In time:5:02  Out time:5:50  Total Treatment Time (min): 48  Visit #: 1 of 8    Medicare/BCBS Only   Total Timed Codes (min):  38 1:1 Treatment Time:  29       Treatment Area: Pain in left knee [M25.562]    SUBJECTIVE  Pain Level (0-10 scale): 5/10  Any medication changes, allergies to medications, adverse drug reactions, diagnosis change, or new procedure performed?: [x] No    [] Yes (see summary sheet for update)  Subjective functional status/changes:   [] No changes reported  \"Soreness, feels like burning,\" pt touched distal quads. \"    OBJECTIVE    Modality rationale: decrease inflammation and decrease pain to improve the patients ability to perform ADL's.    Min Type Additional Details    [] Estim:  []Unatt       []IFC  []Premod                        []Other:  []w/ice   []w/heat  Position:  Location:    [] Estim: []Att    []TENS instruct  []NMES                    []Other:  []w/US   []w/ice   []w/heat  Position:  Location:    []  Traction: [] Cervical       []Lumbar                       [] Prone          []Supine                       []Intermittent   []Continuous Lbs:  [] before manual  [] after manual    []  Ultrasound: []Continuous   [] Pulsed                           []1MHz   []3MHz W/cm2:  Location:    []  Iontophoresis with dexamethasone         Location: [] Take home patch   [] In clinic   10 [x]  Ice     []  heat  []  Ice massage  []  Laser   []  Anodyne Position: Reclined  Location: Left knee    []  Laser with stim  []  Other:  Position:  Location:    []  Vasopneumatic Device Pressure:       [] lo [] med [] hi   Temperature: [] lo [] med [] hi   [] Skin assessment post-treatment:  []intact []redness- no adverse reaction    []redness - adverse reaction:     30 min Therapeutic Exercise:  [x] See flow sheet :   Rationale: increase ROM, increase strength and increase proprioception to improve the patients ability to perform ADL's.    8 min Manual Therapy:  Left patellar mobs, gentle scar massage, STM left anterior knee and quads. PROM Left knee. Pt supine. Rationale: decrease pain, increase ROM, increase tissue extensibility and decrease trigger points to improve ease of performing functional activities and walking/standing tolerance. With   [x] TE   [] TA   [] neuro   [] other: Patient Education: [x] Review HEP    [] Progressed/Changed HEP based on:   [] positioning   [] body mechanics   [] transfers   [] heat/ice application    [] other:      Other Objective/Functional Measures:      Pain Level (0-10 scale) post treatment: 3/10    ASSESSMENT/Changes in Function: Hypersensitive with touch/STM to left medial quads near proximal incision. Pt reports burning/painful sensation. Encouraged pt to continue stretches and strengthening exercises to increase Left knee ROM/strength. Lacking TKE and limited with end range flexion. Patient will continue to benefit from skilled PT services to modify and progress therapeutic interventions, address functional mobility deficits, address ROM deficits, address strength deficits, analyze and address soft tissue restrictions and analyze and modify body mechanics/ergonomics to attain remaining goals. [x]  See Plan of Care  []  See progress note/recertification  []  See Discharge Summary         Progress towards goals / Updated goals:  Updated Goals: to be achieved in 4 weeks:  1. The patient will improve FOTO score to 50 to maximize quality of life. PN: will assess NV 2/25/2020  2. The patient will demonstrate passive left knee extension ROM 5 to improve ease of ambulation. PN: Met 2/13/2020 left knee extension ROM 5 degrees  3. The patient will demonstrate left knee flexion to 110 degrees flexion to improve ease of transfers.   PN: 86 degrees 2/18/2020  4.  The patient will demonstrate ambulation void of compensations and AD to improve ease of ambulation efficiency.   PN: Not met, continued to ambulate with mild antalgic gait pattern 2/25/2020    PLAN  []  Upgrade activities as tolerated     [x]  Continue plan of care  []  Update interventions per flow sheet       []  Discharge due to:_  []  Other:_      Linda House, JAMIE 3/3/2020  4:59 PM    Future Appointments   Date Time Provider Shubham Wayne   3/3/2020  5:00 PM Raissa Heading, PTA [facility]   3/5/2020  6:00 PM Ross Bai [facility]   3/10/2020  6:00 PM Raissa Heading, PTA [facility]   3/12/2020  6:00 PM Donnell Ambrocio, PTA [facility]   3/17/2020  6:00 PM Raissa Heading, PTA [facility]   3/19/2020  6:00 PM Donnell Ambrocio, PTA [facility]   3/24/2020  6:00 PM Raissa Heading, PTA [facility]   3/26/2020  6:00 PM Ruthie WILLIS, PT [facility]

## 2020-03-05 ENCOUNTER — HOSPITAL ENCOUNTER (OUTPATIENT)
Dept: PHYSICAL THERAPY | Age: 75
Discharge: HOME | End: 2020-03-05
Payer: MEDICARE

## 2020-03-05 PROCEDURE — 97140 MANUAL THERAPY 1/> REGIONS: CPT

## 2020-03-05 PROCEDURE — 97110 THERAPEUTIC EXERCISES: CPT

## 2020-03-05 NOTE — PROGRESS NOTES
PT DAILY TREATMENT NOTE 10-18    Patient Name: Kaia Pimentel  Date:3/5/2020  : 1945  [x]  Patient  Verified  Payor: VA MEDICARE / Plan: VA MEDICARE PART A & B / Product Type: Medicare /    In time: 5:46pm  Out time:6:37pm  Total Treatment Time (min): 51  Visit #: 2 of 8    Medicare/BCBS Only   Total Timed Codes (min):  41 1:1 Treatment Time:  41       Treatment Area: Pain in left knee [M25.562]    SUBJECTIVE  Pain Level (0-10 scale): 5/10  Any medication changes, allergies to medications, adverse drug reactions, diagnosis change, or new procedure performed?: [x] No    [] Yes (see summary sheet for update)  Subjective functional status/changes:   [x] No changes reported    OBJECTIVE    33 min Therapeutic Exercise:  [x] See flow sheet :   Rationale: increase ROM and increase strength to improve the patients ability to improve ease of daily activity. 8 min Manual Therapy:  PROM left knee, Scar massage, patellar inf mobs grade 2-3, distal quad STM   Rationale: decrease pain, increase ROM and increase tissue extensibility to improve ease of daily activity sand ambulation. Modality rationale: decrease inflammation and decrease pain to improve the patients ability to improve ease of daily activity.    Min Type Additional Details    [] Estim:  []Unatt       []IFC  []Premod                        []Other:  []w/ice   []w/heat  Position:  Location:    [] Estim: []Att    []TENS instruct  []NMES                    []Other:  []w/US   []w/ice   []w/heat  Position:  Location:    []  Traction: [] Cervical       []Lumbar                       [] Prone          []Supine                       []Intermittent   []Continuous Lbs:  [] before manual  [] after manual    []  Ultrasound: []Continuous   [] Pulsed                           []1MHz   []3MHz Location:  W/cm2:    []  Iontophoresis with dexamethasone         Location: [] Take home patch   [] In clinic   10 [x]  Ice     []  heat  []  Ice massage  []  Laser   [] Anodyne Position: long sit  Location: left knee    []  Laser with stim  []  Other: Position:  Location:    []  Vasopneumatic Device Pressure:       [] lo [] med [] hi   Temperature: [] lo [] med [] hi   [] Skin assessment post-treatment:  []intact [x]redness- no adverse reaction    []redness - adverse reaction:             With   [] TE   [] TA   [] neuro   [] other: Patient Education: [x] Review HEP    [] Progressed/Changed HEP based on:   [] positioning   [] body mechanics   [] transfers   [] heat/ice application    [] other:      Other Objective/Functional Measures: limited knee flexion ROM    TTP and sensitive to touch along proximal incision with adherent scar tissue noted    Pain Level (0-10 scale) post treatment: 4    ASSESSMENT/Changes in Function: Pt continues to demonstrate limited knee flexion ROM and limited scar mobility, with antalgic gait mechanics. Patient will continue to benefit from skilled PT services to modify and progress therapeutic interventions, address functional mobility deficits, address ROM deficits, address strength deficits, analyze and address soft tissue restrictions, analyze and cue movement patterns, analyze and modify body mechanics/ergonomics and instruct in home and community integration to attain remaining goals. []  See Plan of Care  []  See progress note/recertification  []  See Discharge Summary         Progress towards goals / Updated goals:  Updated Goals: to be achieved in 4 weeks:  1. The patient will improve FOTO score to 50 to maximize quality of life. PN: will assess NV 2/25/2020  2. The patient will demonstrate passive left knee extension ROM 5 to improve ease of ambulation. PN: Met 2/13/2020 left knee extension ROM 5 degrees  3. The patient will demonstrate left knee flexion to 110 degrees flexion to improve ease of transfers.   PN: 86 degrees 2/18/2020  4.  The patient will demonstrate ambulation void of compensations and AD to improve ease of ambulation efficiency.   PN: Not met, continued to ambulate with mild antalgic gait pattern 2/25/2020    PLAN  []  Upgrade activities as tolerated     [x]  Continue plan of care  []  Update interventions per flow sheet       []  Discharge due to:_  []  Other:_      Nury Hurlye, PT, DPT, ATC 3/5/2020  5:55 PM    Future Appointments   Date Time Provider Shubham Wayne   3/5/2020  6:00 PM Keri [facility]   3/10/2020  6:00 PM Christiane Dwyer, PTA [facility]   3/12/2020  6:00 PM Nicole Smith, PTA [facility]   3/17/2020  6:00 PM Christiaen Dwyer, PTA [facility]   3/19/2020  6:00 PM Nicole Smith, PTA [facility]   3/24/2020  6:00 PM Christiane Dwyer, PTA [facility]   3/26/2020  6:00 PM Moira WILLIS, PT [facility]

## 2020-03-10 ENCOUNTER — HOSPITAL ENCOUNTER (OUTPATIENT)
Dept: PHYSICAL THERAPY | Age: 75
Discharge: HOME | End: 2020-03-10
Payer: MEDICARE

## 2020-03-10 PROCEDURE — 97140 MANUAL THERAPY 1/> REGIONS: CPT

## 2020-03-10 PROCEDURE — 97110 THERAPEUTIC EXERCISES: CPT

## 2020-03-10 NOTE — PROGRESS NOTES
PT DAILY TREATMENT NOTE 10-18    Patient Name: Krys Block  Date:3/10/2020  : 1945  [x]  Patient  Verified  Payor: [place] MEDICARE / Plan: [place] MEDICARE PART A & B / Product Type: Medicare /    In time:5:59  Out time:6:43  Total Treatment Time (min): 44  Visit #: 3 of 8    Medicare/BCBS Only   Total Timed Codes (min):  44 1:1 Treatment Time:  34       Treatment Area: Pain in left knee [M25.562]    SUBJECTIVE  Pain Level (0-10 scale): 5/10  Any medication changes, allergies to medications, adverse drug reactions, diagnosis change, or new procedure performed?: [x] No    [] Yes (see summary sheet for update)  Subjective functional status/changes:   [x] No changes reported    OBJECTIVE    36 min Therapeutic Exercise:  [x] See flow sheet :   Rationale: increase ROM, increase strength and increase proprioception to improve the patients ability to perform ADL's.    8 min Manual Therapy:  Left patellar mobs, gentle scar massage, STM left anterior knee and quads. PROM Left knee. Pt supine. Rationale: decrease pain, increase ROM, increase tissue extensibility and decrease trigger points to improve ease of ambulation and negotiating steps/stairs. With   [x] TE   [] TA   [] neuro   [] other: Patient Education: [x] Review HEP    [] Progressed/Changed HEP based on:   [] positioning   [] body mechanics   [] transfers   [] heat/ice application    [] other:      Other Objective/Functional Measures: AROM Left knee flexion 90 degrees. Pain Level (0-10 scale) post treatment: 0/10    ASSESSMENT/Changes in Function: Knee flexion improved by 4 degrees. Lots of soft tissue restrictions in anterior Left knee. Pt denied pain post-treatment. Continue PT to increase knee ROM flexion to improve ease of performing functional activities.     Patient will continue to benefit from skilled PT services to modify and progress therapeutic interventions, address functional mobility deficits, address ROM deficits, address strength deficits, analyze and address soft tissue restrictions and analyze and cue movement patterns to attain remaining goals. [x]  See Plan of Care  []  See progress note/recertification  []  See Discharge Summary         Progress towards goals / Updated goals:  Updated Goals: to be achieved in 4 weeks:  1. The patient will improve FOTO score to 50 to maximize quality of life. PN: will assess NV 2/25/2020  2. The patient will demonstrate passive left knee extension ROM 5 to improve ease of ambulation. PN: Met 2/13/2020 left knee extension ROM 5 degrees  3. The patient will demonstrate left knee flexion to 110 degrees flexion to improve ease of transfers.   PN: 86 degrees 2/18/2020  Current: Progressing, 90 degrees. 3/10/2020  4. The patient will demonstrate ambulation void of compensations and AD to improve ease of ambulation efficiency.   PN: Not met, continued to ambulate with mild antalgic gait pattern 2/25/2020    PLAN  []  Upgrade activities as tolerated     [x]  Continue plan of care  []  Update interventions per flow sheet       []  Discharge due to:_  []  Other:_      Crow Prom, PTA 3/10/2020  5:49 PM    Future Appointments   Date Time Provider Shubham Wayne   3/10/2020  6:00 PM Velbartolo Steven, [place] [facility]   3/12/2020  6:00 PM Georgiana Garcia, PTA [facility]   3/17/2020  6:00 PM Velton Dies, PTA [facility]   3/19/2020  6:00 PM Georgiana Garcia, PTA [facility]   3/24/2020  6:00 PM Velton Dies, PTA [facility]   3/26/2020  6:00 PM Sherren Ala S, PT [facility]

## 2020-03-12 ENCOUNTER — HOSPITAL ENCOUNTER (OUTPATIENT)
Dept: PHYSICAL THERAPY | Age: 75
Discharge: HOME | End: 2020-03-12
Payer: MEDICARE

## 2020-03-12 PROCEDURE — 97110 THERAPEUTIC EXERCISES: CPT

## 2020-03-12 PROCEDURE — 97140 MANUAL THERAPY 1/> REGIONS: CPT

## 2020-03-12 NOTE — PROGRESS NOTES
PT DAILY TREATMENT NOTE 10-18    Patient Name: Tori Ashley  Date:3/12/2020  : 1945  [x]  Patient  Verified  Payor: VA MEDICARE / Plan: VA MEDICARE PART A & B / Product Type: Medicare /    In time:5:58  Out time:6:51  Total Treatment Time (min): 48  Visit #: 4 of 8    Medicare/BCBS Only   Total Timed Codes (min):  43 1:1 Treatment Time:  37       Treatment Area: Pain in left knee [M25.562]    SUBJECTIVE  Pain Level (0-10 scale): 6  Any medication changes, allergies to medications, adverse drug reactions, diagnosis change, or new procedure performed?: [x] No    [] Yes (see summary sheet for update)  Subjective functional status/changes:   [] No changes reported  Pt reports her left knee is doing good and that its her right knee that is giving her pain and discomfort. OBJECTIVE    Modality rationale: decrease inflammation and decrease pain to improve the patients ability to perform functional task with ease.    Min Type Additional Details    [] Estim:  []Unatt       []IFC  []Premod                        []Other:  []w/ice   []w/heat  Position:  Location:    [] Estim: []Att    []TENS instruct  []NMES                    []Other:  []w/US   []w/ice   []w/heat  Position:  Location:    []  Traction: [] Cervical       []Lumbar                       [] Prone          []Supine                       []Intermittent   []Continuous Lbs:  [] before manual  [] after manual    []  Ultrasound: []Continuous   [] Pulsed                           []1MHz   []3MHz W/cm2:  Location:    []  Iontophoresis with dexamethasone         Location: [] Take home patch   [] In clinic   10 [x]  Ice     []  heat  []  Ice massage  []  Laser   []  Anodyne Position: reclined  Location: B knees    []  Laser with stim  []  Other:  Position:  Location:    []  Vasopneumatic Device Pressure:       [] lo [] med [] hi   Temperature: [] lo [] med [] hi   [] Skin assessment post-treatment:  []intact []redness- no adverse reaction    []redness - adverse reaction:       *35 min Therapeutic Exercise:  [x] See flow sheet :   Rationale: increase ROM and increase strength to improve the patients ability to ambulate with ease. 8 min Manual Therapy:  PROM left knee, Scar massage, patellar inf mobs grade 2-3, distal quad STM   Rationale: decrease pain, increase ROM and increase tissue extensibility to improve functional mobility with ADL's. With   [] TE   [] TA   [] neuro   [] other: Patient Education: [x] Review HEP    [] Progressed/Changed HEP based on:   [] positioning   [] body mechanics   [] transfers   [] heat/ice application    [] other:      Other Objective/Functional Measures:      Pain Level (0-10 scale) post treatment: 3    ASSESSMENT/Changes in Function:   Continued MM restrictions in the distal quads with pt being TTP near the incision. Continued mobility limitations with pt reporting she is more limited in her right knee than she is in her left. Pt reports good relief following modalities. Patient will continue to benefit from skilled PT services to modify and progress therapeutic interventions, address functional mobility deficits, address ROM deficits, address strength deficits, analyze and address soft tissue restrictions, analyze and cue movement patterns, analyze and modify body mechanics/ergonomics and assess and modify postural abnormalities to attain remaining goals. [x]  See Plan of Care  []  See progress note/recertification  []  See Discharge Summary         Progress towards goals / Updated goals:  Updated Goals: to be achieved in 4 weeks:  1. The patient will improve FOTO score to 50 to maximize quality of life. PN: will assess NV 2/25/2020  2. The patient will demonstrate passive left knee extension ROM 5 to improve ease of ambulation. PN: Met 2/13/2020 left knee extension ROM 5 degrees  3.  The patient will demonstrate left knee flexion to 110 degrees flexion to improve ease of transfers.   PN: 86 degrees 2/18/2020  Current: Progressing, 90 degrees. 3/10/2020  4. The patient will demonstrate ambulation void of compensations and AD to improve ease of ambulation efficiency. PN: Not met, continued to ambulate with mild antalgic gait pattern 2/25/2020  Current: Not met 3/12/2020 Pt continues to ambulate with an antalgic gait.     PLAN  []  Upgrade activities as tolerated     [x]  Continue plan of care  []  Update interventions per flow sheet       []  Discharge due to:_  []  Other:_      Brenna Martin PTA 3/12/2020  6:07 PM    Future Appointments   Date Time Provider Shubham Wayne   [facility]2020  [facility]   3/19/2020  6:00 PM Cassell Kocher, PTA [facility]   3/24/2020  6:00 PM Miguel Harris \[facility] [place] [facility]"" [facility]   3/26/2020  6:00 PM [place] PT [facility]

## 2020-03-19 ENCOUNTER — APPOINTMENT (OUTPATIENT)
Dept: PHYSICAL THERAPY | Age: 75
End: 2020-03-19
Payer: MEDICARE

## 2020-03-24 ENCOUNTER — APPOINTMENT (OUTPATIENT)
Dept: PHYSICAL THERAPY | Age: 75
End: 2020-03-24
Payer: MEDICARE

## 2020-03-26 ENCOUNTER — APPOINTMENT (OUTPATIENT)
Dept: PHYSICAL THERAPY | Age: 75
End: 2020-03-26
Payer: MEDICARE

## 2020-03-31 NOTE — PROGRESS NOTES
[facility]  [address] [facility] [address]  [place], [address]  [phone] fax    Physical Therapy Discharge Summary    Patient name: Christian Alcantar Start of Care: 2020   Referral source: Fabian Price MD : 1945   Medical/Treatment Diagnosis: Pain in left knee [M25.562]  Payor: VA MEDICARE / Plan: VA MEDICARE PART A & B / Product Type: Medicare /  Onset Date:2019      Prior Hospitalization: see medical history Provider#: [id number]   Medications: Verified on Patient Summary List      Comorbidities: Diabetes, HTN, BMI > 30, right and left TKR, c section, 1 kidney removed   Prior Level of Function: pain with walking and transfers. Visits from Start of Care: 12    Missed Visits: 1  Reporting Period : 2020 to 3/12/2020    Summary of Care:  Updated Goals: to be achieved in 4 weeks:  1. The patient will improve FOTO score to 50 to maximize quality of life. PN: will assess NV 2020  2. The patient will demonstrate passive left knee extension ROM 5 to improve ease of ambulation. PN: Met 2020 left knee extension ROM 5 degrees  3. The patient will demonstrate left knee flexion to 110 degrees flexion to improve ease of transfers.   PN: 86 degrees 2020  Current: Progressing, 90 degrees. 3/10/2020  4. The patient will demonstrate ambulation void of compensations and AD to improve ease of ambulation efficiency. PN: Not met, continued to ambulate with mild antalgic gait pattern 2020  Current: Not met 3/12/2020 Pt continues to ambulate with an antalgic gait. ASSESSMENT/RECOMMENDATIONS: The patient's daughter called and stated that the patient's MD requested MD at this time. Slow progress was attained with knee flexion as well as extension. Her ambulation efficiency continued to demonstrate limitations noting an antalgic gait.      [x]Discontinue therapy:     Nadir Chamberlain, PT 3/31/2020 4:38 PM

## 2021-08-03 PROBLEM — E66.01 OBESITY, MORBID (HCC): Status: RESOLVED | Noted: 2018-11-14 | Resolved: 2021-08-03

## 2021-10-22 ENCOUNTER — HOSPITAL ENCOUNTER (OUTPATIENT)
Dept: PHYSICAL THERAPY | Age: 76
Discharge: HOME | End: 2021-10-22
Payer: MEDICARE

## 2021-10-22 PROCEDURE — 97530 THERAPEUTIC ACTIVITIES: CPT

## 2021-10-22 PROCEDURE — 97162 PT EVAL MOD COMPLEX 30 MIN: CPT

## 2021-10-22 PROCEDURE — 97116 GAIT TRAINING THERAPY: CPT

## 2021-10-22 NOTE — PROGRESS NOTES
[facility], [address] [place] [address]  [phone] fax  Plan of Care/ Statement of Necessity for Physical Therapy Services    Patient name: Rajeev Lewis Start of Care: 10/22/2021   Referral source: Adolph Castillo MD : 1945    Medical Diagnosis: Other abnormalities of gait and mobility [R26.89]  Pain in right hip [M25.551]  Pain in left hip [M25.552]  Payor: VA MEDICARE / Plan: VA MEDICARE PART A & B / Product Type: Medicare /  Onset Date:2019    Treatment Diagnosis: right sided knee pain    Prior Hospitalization: see medical history Provider#: [id number]   Medications: Verified on Patient summary List    Comorbidities:  Diabetes, HTN, BMI > 30, right and left TKR, c section, 1 kidney removed   Prior Level of Function: pain with walking and transfers. The Plan of Care and following information is based on the information from the initial evaluation. Assessment/ key information: Pt is a 77 y.o. female who presents to Physical Therapy with c/o right knee pain following a TKR in 2019. Pt reports that her right knee has never fully recovered and feels heavier than her left. Functional deficits include not being able to keep moving and be active with her family. Upon exam, Pt exhibited decreased knee ROM, palpable tightness on medial hamstring attachment, impaired gait mechanics, and decreased functional strength as noted by 5xSTS.     Pt would benefit from skilled PT services for aquatic therapy to address the aforementioned impairments and to improve pt's function and ability to return to PLOF without pain or restriction.     Evaluation Complexity History HIGH Complexity :3+ comorbidities / personal factors will impact the outcome/ POC ; Examination LOW Complexity : 1-2 Standardized tests and measures addressing body structure, function, activity limitation and / or participation in recreation  ;Presentation LOW Complexity : Stable, uncomplicated  ;Clinical Decision Making MEDIUM Complexity : FOTO score of 26-74  Overall Complexity Rating: MEDIUM  Problem List: pain affecting function, decrease ROM, decrease strength, edema affecting function, impaired gait/ balance, decrease ADL/ functional abilitiies, decrease activity tolerance, decrease flexibility/ joint mobility and decrease transfer abilities   Treatment Plan may include any combination of the following: Therapeutic exercise, Therapeutic activities, Neuromuscular re-education, Physical agent/modality, Gait/balance training, Manual therapy, Aquatic therapy, Patient education, Self Care training, Functional mobility training, Home safety training and Stair training  Patient / Family readiness to learn indicated by: asking questions, trying to perform skills and interest  Persons(s) to be included in education: patient (P)  Barriers to Learning/Limitations: yes;  language  Patient Goal (s): \"I want to regain her mobility and independence  Patient Self Reported Health Status: good  Rehabilitation Potential: good      Short Term Goals: To be accomplished in 1 weeks:  Goal: Pt to be compliant with initial HEP to improve functional mobility   Status at last note/certification: Established and reviewed with Pt    Goal: Pt to initiate aquatics program without increased pain to aid in achievement of all LTGs. Status at last note/certification: N/A    Long Term Goals: To be accomplished in 5 weeks:  Goal: Pt to demonstrate 110 degrees of knee flexion on Right LE for improved gait mechanics  Status at last note/certification: 90 degrees of flexion     Goal: Pt to perform 5xSTS in <12 seconds for improved functional strength  Status at last note/certification: 05.57 seconds     Goal: Pt to report FOTO score of 55 to show improved function and quality of life.   Status at last note/certification: FOTO 40 pts     Goal: Pt will demonstrate gross right LE strength at 4+/5 for improved tolerance of functional mobility. Status at last note/certification: right hip flexion: 3+/5, knee flex/ext: 4/5    Frequency / Duration: Patient to be seen 2 times per week for 5 weeks. Patient/ Caregiver education and instruction: Diagnosis, prognosis, self care, activity modification, brace/ splint application and exercises   [x]  Plan of care has been reviewed with PTA    Certification Period: 10/22/21-11/20/21    Sugey Lo, PT 10/22/2021 10:54 AM  _____________________________________________________________________  I certify that the above Therapy Services are being furnished while the patient is under my care. I agree with the treatment plan and certify that this therapy is necessary.     [de-identified] Signature:____________Date:_________TIME:________     Jennifer Rehman MD  ** Signature, Date and Time must be completed for valid certification **    Please sign and return to [facility], [address] [place] [address]  [phone] fax

## 2021-10-22 NOTE — PROGRESS NOTES
PT DAILY TREATMENT NOTE/KNEE EVAL     Patient Name: Nithya Aiken  Date:10/22/2021  : 1945  [x]  Patient  Verified  Payor: VA MEDICARE / Plan: VA MEDICARE PART A & B / Product Type: Medicare /    In time:11:16a  Out time:12:06a  Total Treatment Time (min): 50  Visit #: 1 of 10    Medicare/BCBS Only   Total Timed Codes (min):  30 1:1 Treatment Time:  30     Treatment Area: Right knee pain [M25.561]  Left knee pain [M25.562]  Right hip pain [M25.551]  Muscle weakness (generalized) [M62.81]    SUBJECTIVE  Pain Level (0-10 scale): n/a  []constant []intermittent []improving []worsening []no change since onset   right knee pain. Following surgery in 2019 pain never got better, and now she has tightness. Something inside of her knee that is feeling tight. Any medication changes, allergies to medications, adverse drug reactions, diagnosis change, or new procedure performed?: [x] No    [] Yes (see summary sheet for update)  Subjective functional status/changes:  Pt reports that she had a TKR on her right knee 2019 and a left TKR in 2019. Pt reports that rehab and recovery of the left knee was smooth however her right knee never felt better and always feels tight. Pt reports that right leg feels heavy compared to the left. PLOF: ambulating with cane secondary to arthritis. Limitations to PLOF: tightness and knee pain.    Mechanism of Injury: Surgery (2019) of Right TKA  Current symptoms/Complaints: Stiffness  Previous Treatment/Compliance: Pt underwent PT for left knee without issue however right knee has not improved since surgery   PMHx/Surgical Hx: TKA right (2019), TKA left (2019), HTN/diabetes (controlled)   Work Hx: retired  Living Situation: lives at home with daughter in a two story home  Pt Goals: she wants to regain her mobility and independence  Motivation: Pt appears motivated to participate in PT.      OBJECTIVE/EXAMINATION  Activity/Recreational Limitations: unable to continue moving   Mobility: Ambulates with cane, more because her knee feels it may throw her down  Self Care: independent with discomfort    20 min [x]Eval                  []Re-Eval     20 min Therapeutic Activity:  [x]  See flow sheet :   Rationale: increase ROM, increase strength and improve coordination  to improve the patients ability to tolerate functional mobility and daily routine    10 min Gait Trainin feet with SPC device on level surfaces with SBA    Rationale: to improve ambulation tolerance and endurance. With   [] TE   [x] TA   [] neuro   [x] other: Patient Education: [x] Review HEP    [] Progressed/Changed HEP based on:   [] positioning   [] body mechanics   [] transfers   [] heat/ice application    [] other:      Other Objective/Functional Measures: Established HEP    Physical Therapy Evaluation - Knee      ROM / Strength: unable to thoroughly assess all ROM and strength secondary to time constraint and language barrier. [x] Unable to assess                  AROM                      Strength (1-5)    Left Right Left Right   Hip Flexion n/a n/a 4+ 3+   Knee Flexion 105 90 5 4    Extension WFL -5 5 4       Flexibility:   Hamstrings:    (L) Tightness= [] WNL   [x] Min   [] Mod   [] Severe    (R) Tightness= [] WNL   [] Min   [] Mod   [x] Severe  Quadriceps:    (L) Tightness= [x] WNL   [] Min   [] Mod   [] Severe    (R) Tightness= [] WNL   [] Min   [x] Mod   [] Severe      Palpation: pt reports being TTP on right side only. Pt with palpable \"rope feeling\" tightness at medial hamstring attachment on posterior knee. Neg/Pos  Neg/Pos  Neg/Pos   Joint Line pos Quad tendon pos Patellar ligament pos   Patella pos Fibular head neg Pes Anserinus pos   Tibial tubercle pos Hamstring tendons pos Infrapatellar fat pad neg       Other tests/comments:  5x STS: 13.71    Gait: Pt ambulating with SPC demonstrating decreased knee and hip flexion on right side and increased trunk sway.  Pt with decreased step length and stance time on right leg        Pain Level (0-10 scale) post treatment: n/a no pain only stiffness     ASSESSMENT/Changes in Function:     Patient will continue to benefit from skilled PT services to modify and progress therapeutic interventions, address functional mobility deficits, address ROM deficits, address strength deficits, analyze and address soft tissue restrictions, analyze and cue movement patterns, analyze and modify body mechanics/ergonomics, assess and modify postural abnormalities, address imbalance/dizziness and instruct in home and community integration to attain remaining goals.      [x]  See Plan of Care  []  See progress note/recertification  []  See Discharge Summary         Progress towards goals / Updated goals:  See POC    PLAN  [x]  Upgrade activities as tolerated     []  Continue plan of care  []  Update interventions per flow sheet       []  Discharge due to:_  []  Other:_      Dontae Vegas, PT 10/22/2021  4:00 PM

## 2021-10-28 ENCOUNTER — HOSPITAL ENCOUNTER (OUTPATIENT)
Dept: PHYSICAL THERAPY | Age: 76
Discharge: HOME | End: 2021-10-28
Payer: MEDICARE

## 2021-10-28 PROCEDURE — 97113 AQUATIC THERAPY/EXERCISES: CPT

## 2021-11-02 ENCOUNTER — HOSPITAL ENCOUNTER (OUTPATIENT)
Dept: PHYSICAL THERAPY | Age: 76
Discharge: HOME | End: 2021-11-02
Payer: MEDICARE

## 2021-11-02 PROCEDURE — 97113 AQUATIC THERAPY/EXERCISES: CPT

## 2021-11-04 ENCOUNTER — HOSPITAL ENCOUNTER (OUTPATIENT)
Dept: PHYSICAL THERAPY | Age: 76
Discharge: HOME | End: 2021-11-04
Payer: MEDICARE

## 2021-11-04 PROCEDURE — 97113 AQUATIC THERAPY/EXERCISES: CPT

## 2021-11-09 ENCOUNTER — HOSPITAL ENCOUNTER (OUTPATIENT)
Dept: PHYSICAL THERAPY | Age: 76
Discharge: HOME | End: 2021-11-09
Payer: MEDICARE

## 2021-11-09 PROCEDURE — 97113 AQUATIC THERAPY/EXERCISES: CPT

## 2021-11-11 ENCOUNTER — HOSPITAL ENCOUNTER (OUTPATIENT)
Dept: PHYSICAL THERAPY | Age: 76
Discharge: HOME | End: 2021-11-11
Payer: MEDICARE

## 2021-11-11 PROCEDURE — 97113 AQUATIC THERAPY/EXERCISES: CPT

## 2021-11-16 ENCOUNTER — HOSPITAL ENCOUNTER (OUTPATIENT)
Dept: PHYSICAL THERAPY | Age: 76
Discharge: HOME | End: 2021-11-16
Payer: MEDICARE

## 2021-11-16 PROCEDURE — 97113 AQUATIC THERAPY/EXERCISES: CPT

## 2021-11-18 ENCOUNTER — HOSPITAL ENCOUNTER (OUTPATIENT)
Dept: PHYSICAL THERAPY | Age: 76
Discharge: HOME | End: 2021-11-18
Payer: MEDICARE

## 2021-11-18 PROCEDURE — 97113 AQUATIC THERAPY/EXERCISES: CPT

## 2021-11-18 NOTE — PROGRESS NOTES
[facility], [address] [place] [address]  [phone] fax    Continued Plan of Care/ Re-certification for Physical Therapy Services      Patient name: Jennifer Pino Start of Care: 10/22/2021   Referral source: Carola Alfred MD : 1945               Medical Diagnosis: Other abnormalities of gait and mobility [R26.89]  Pain in right hip [M25.551]  Pain in left hip [M25.552]  Payor: [place] MED[place] / Plan: [place] MEDICARE PART A & B / Product Type: Medicare /  Onset Date:2019               Treatment Diagnosis: right sided knee pain    Prior Hospitalization: see medical history Provider#: [id number]   Medications: Verified on Patient summary List    Comorbidities:  Diabetes, HTN, BMI > 30, right and left TKR, c section, 1 kidney removed   Prior Level of Function: pain with walking and transfers. Visits from Start of Care: 8    Missed Visits: 0    The Plan of Care and following information is based on the patient's current status:    Short Term Goals:  Goal: Pt to be compliant with initial HEP to improve functional mobility   Status at last note/certification: Met; pt is compliant with her HEP.      Goal: Pt to initiate aquatics program without increased pain to aid in achievement of all LTGs. Status at last note/certification: Met; pt consistently reports decreased pain during and immediately following aquatic exercises.      Long Term Goals:   Goal: Pt to demonstrate 110 degrees of knee flexion on Right LE for improved gait mechanics  Status at last note/certification: Not met; 90 degrees of flexion      Goal: Pt to perform 5xSTS in <12 seconds for improved functional strength  Status at last note/certification: Not met: 13.86 seconds      Goal: Pt to report FOTO score of 55 to show improved function and quality of life.   Status at last note/certification: Regressed; FOTO 26 pts      Goal: Pt will demonstrate gross right LE strength at 4+/5 for improved tolerance of functional mobility. Status at last note/certification: Progressing; right hip flexion: 4/5, knee flex/ext: 4+/5    Key functional changes: Pt has made minimal improvement in right LE strength, but otherwise reports no change since beginning PT. Her FOTO score has regressed since her initial evaluation date indicating increased difficulty with ADLs. Problems/ barriers to goal attainment: None      Problem List: pain affecting function, decrease ROM, decrease strength, impaired gait/ balance, decrease ADL/ functional abilitiies, decrease activity tolerance, decrease flexibility/ joint mobility and decrease transfer abilities    Treatment Plan: Therapeutic exercise, Therapeutic activities, Neuromuscular re-education, Physical agent/modality, Gait/balance training, Manual therapy, Aquatic therapy, Patient education, Functional mobility training, Home safety training and Stair training     Patient Goal (s) has been updated and includes: \"I want to regain my mobility and independence\"     Goals for this certification period to be accomplished in 4 weeks:  Goal: Pt to demonstrate 110 degrees of knee flexion on Right LE for improved gait mechanics  Status at last note/certification: 90 degrees of flexion      Goal: Pt to perform 5xSTS in <12 seconds for improved functional strength  Status at last note/certification: 80.05 seconds      Goal: Pt to report FOTO score of 55 to show improved function and quality of life. Status at last note/certification:  FOTO 26 pts      Goal: Pt will demonstrate gross right LE strength at 4+/5 for improved tolerance of functional mobility. Status at last note/certification: right hip flexion: 4/5, knee flex/ext: 4+/5    Frequency / Duration: Patient to be seen 2 times per week for 4 weeks:    Assessment / Recommendations:Pt demonstrates minimal to no significant progress since beginning PT.  She verbalizes frustration at her continued right knee pain that \"has been going on for two years\". Pt enjoys the aquatic exercises, as she feels minimal to no pain while in the water and is able to walk and perform strengthening and flexibility exercises without pain (as compared to her prior experience with land-based PT) but pt reports having no carry-over once she is home. Pt is highly motivated and is compliant with HEP and recommendations from PT. Discussed with pt recommendation to continue with aquatic therapy at this time to attempt progression with goals and functional progress with plan to transition to and independent program (Post-Rehab program) when appropriate and pt in agreement. Certification Period: 11/19/21 - 12/19/21    Yusuf Harvey, PT 11/18/2021 11:59 AM    ________________________________________________________________________  I certify that the above Therapy Services are being furnished while the patient is under my care. I agree with the treatment plan and certify that this therapy is necessary. [] I have read the above and request that my patient continue as recommended.   [] I have read the above report and request that my patient continue therapy with the following changes/special instructions: ______________________________________  [] I have read the above report and request that my patient be discharged from therapy    Physician's Signature:____________Date:_________TIME:________     Larinda Essex, MD  ** Signature, Date and Time must be completed for valid certification **    Please sign and return to [facility]  [phone] fax

## 2021-11-23 ENCOUNTER — HOSPITAL ENCOUNTER (OUTPATIENT)
Dept: PHYSICAL THERAPY | Age: 76
Discharge: HOME | End: 2021-11-23
Payer: MEDICARE

## 2021-11-23 PROCEDURE — 97113 AQUATIC THERAPY/EXERCISES: CPT

## 2021-11-30 ENCOUNTER — HOSPITAL ENCOUNTER (OUTPATIENT)
Dept: PHYSICAL THERAPY | Age: 76
Discharge: HOME | End: 2021-11-30
Payer: MEDICARE

## 2021-11-30 PROCEDURE — 97113 AQUATIC THERAPY/EXERCISES: CPT

## 2021-12-02 ENCOUNTER — APPOINTMENT (OUTPATIENT)
Dept: PHYSICAL THERAPY | Age: 76
End: 2021-12-02
Payer: MEDICARE

## 2021-12-02 ENCOUNTER — HOSPITAL ENCOUNTER (OUTPATIENT)
Dept: PHYSICAL THERAPY | Age: 76
Discharge: HOME | End: 2021-12-02
Payer: MEDICARE

## 2021-12-02 PROCEDURE — 97113 AQUATIC THERAPY/EXERCISES: CPT

## 2021-12-07 ENCOUNTER — HOSPITAL ENCOUNTER (OUTPATIENT)
Dept: PHYSICAL THERAPY | Age: 76
Discharge: HOME | End: 2021-12-07
Payer: MEDICARE

## 2021-12-07 PROCEDURE — 97113 AQUATIC THERAPY/EXERCISES: CPT

## 2021-12-09 ENCOUNTER — HOSPITAL ENCOUNTER (OUTPATIENT)
Dept: PHYSICAL THERAPY | Age: 76
Discharge: HOME | End: 2021-12-09
Payer: MEDICARE

## 2021-12-09 PROCEDURE — 97113 AQUATIC THERAPY/EXERCISES: CPT

## 2021-12-14 ENCOUNTER — HOSPITAL ENCOUNTER (OUTPATIENT)
Dept: PHYSICAL THERAPY | Age: 76
Discharge: HOME | End: 2021-12-14
Payer: MEDICARE

## 2021-12-14 PROCEDURE — 97113 AQUATIC THERAPY/EXERCISES: CPT

## 2021-12-16 ENCOUNTER — HOSPITAL ENCOUNTER (OUTPATIENT)
Dept: PHYSICAL THERAPY | Age: 76
Discharge: HOME | End: 2021-12-16
Payer: MEDICARE

## 2021-12-16 PROCEDURE — 97113 AQUATIC THERAPY/EXERCISES: CPT

## 2021-12-16 NOTE — PROGRESS NOTES
[facility] - [facility], [address] [place] [address]  [phone] fax    Discharge Summary    Patient name: Marthena Burkitt Start of Care: 10/22/21   Referral source: Vaishali Villatoro MD : 1945   Medical/Treatment Diagnosis: Right knee pain [M25.561]  Left knee pain [M25.562]  Right hip pain [M25.551]  Muscle weakness (generalized) [M62.81]  Payor: [place] MEDICARE / Plan: [place] MEDICARE PART A & B / Product Type: Medicare /  Onset Date:2019     Prior Hospitalization: see medical history Provider#: [id number]   Medications: Verified on Patient Summary List     Comorbidities:  Diabetes, HTN, BMI > 30, right and left TKR, c section, 1 kidney removed   Prior Level of Function: pain with walking and transfers. Visits from Start of Care: 14    Missed Visits: 0    Reporting Period : 10/22/21 to 21    Goals for this certification period to be accomplished in 4 weeks:  Goal: Pt to demonstrate 110 degrees of knee flexion on Right LE for improved gait mechanics  Status at last note/certification: 90 degrees of flexion   Status at discharge: not met: 90 degrees measured in sitting, accuracy of measurements affected by positioning (21)     Goal: Pt to perform 5xSTS in <12 seconds for improved functional strength  Status at last note/certification: 13.86 seconds   Status at discharge: goal met: 9.75 seconds (21)     Goal: Pt to report FOTO score of 55 to show improved function and quality of life.   Status at last note/certification:  CLFM 04 pts    Status at discharge: not met FOTO: 48 pts (21)    Goal: Pt will demonstrate gross right LE strength at 4+/5 for improved tolerance of functional mobility.   Status at last note/certification: right hip flexion: 4/5, knee flex/ext: 4+/5  Status at discharge: not met: hip flex:4/5, knee ext:5/5, knee flex: 4+/5 (21)    Assessment/ Summary of Care: Pt attended 14 visits consistently and made good progress with skilled physical therapy services. At time of last visit, Pt reported the following:  Functional Gains - improved pain management and mobility; Functional Deficits - walking and standing; and 75% improvement since start of care. Pt has met or is progressing towards all goals and is compliant with comprehensive HEP. Pt is appropriate for D/C to independent post rehab program at this time to continue to manage care independently and maintain long term gains made with skilled therapy.   Thank you for this referral.      RECOMMENDATIONS:  [x]Discontinue therapy: [x]Patient has reached or is progressing toward set goals      []Patient is non-compliant or has abdicated      []Due to lack of appreciable progress towards set goals    Ryan Connell, PT 12/16/2021 9:15 AM

## 2021-12-21 ENCOUNTER — APPOINTMENT (OUTPATIENT)
Dept: PHYSICAL THERAPY | Age: 76
End: 2021-12-21
Payer: MEDICARE

## 2021-12-23 ENCOUNTER — APPOINTMENT (OUTPATIENT)
Dept: PHYSICAL THERAPY | Age: 76
End: 2021-12-23
Payer: MEDICARE

## (undated) DEVICE — TUBING, SUCTION, 1/4" X 20', STRAIGHT: Brand: MEDLINE INDUSTRIES, INC.

## (undated) DEVICE — SHEET, DRAPE, SPLIT, STERILE: Brand: MEDLINE

## (undated) DEVICE — SOLUTION IRRIG 3000ML 0.9% SOD CHL FLX CONT 0797208] ICU MEDICAL INC]

## (undated) DEVICE — Device

## (undated) DEVICE — INTENDED FOR TISSUE SEPARATION, AND OTHER PROCEDURES THAT REQUIRE A SHARP SURGICAL BLADE TO PUNCTURE OR CUT.: Brand: BARD-PARKER SAFETY BLADES SIZE 10, STERILE

## (undated) DEVICE — BANDAGE COMPR W4INXL5YD BGE COHESIVE SELF ADH ADBAN CBN1104] AVCOR HEALTHCARE PRODUCTS INC]

## (undated) DEVICE — HOOD, PEEL-AWAY: Brand: FLYTE

## (undated) DEVICE — KIT APPL W/ SPRY TIP ACCELERATE

## (undated) DEVICE — GOWN,NON-REINFORCED,XXL: Brand: MEDLINE

## (undated) DEVICE — BRUSH SCRB 4% CHG RED DISP --

## (undated) DEVICE — SUTURE STRATAFIX SPRL MCRYL + SZ 4-0 L12IN ABSRB UD PS-2 SXMP1B117

## (undated) DEVICE — SYSTEM SKIN CLSR 22CM DERMBND PRINEO

## (undated) DEVICE — DRESSING,GAUZE,XEROFORM,CURAD,5"X9",ST: Brand: CURAD

## (undated) DEVICE — PIN FIX TEMP HDLSS 1/8X3.5IN -- PASSPORT

## (undated) DEVICE — SYR 50ML LR LCK 1ML GRAD NSAF --

## (undated) DEVICE — SYR ASPIR ACDA INCL 60ML

## (undated) DEVICE — SUTURE VCRL SZ 2-0 L27IN ABSRB UD L26MM SH 1/2 CIR J417H

## (undated) DEVICE — DEPAUL TOTAL JOINT CDS: Brand: MEDLINE INDUSTRIES, INC.

## (undated) DEVICE — TRAY PREP DRY W/ PREM GLV 2 APPL 6 SPNG 2 UNDPD 1 OVERWRAP

## (undated) DEVICE — TABLE COVER: Brand: CONVERTORS

## (undated) DEVICE — HANDPIECE SET WITH SOFT TISSUE TIP AND SUCTION TUBE: Brand: INTERPULSE

## (undated) DEVICE — SYSTEM PRP 60CC USE OF CNTRFUG FOR PLT RICH PLSM ACCELERATE

## (undated) DEVICE — SPONGE LAP 18X18IN STRL -- 5/PK

## (undated) DEVICE — REM POLYHESIVE ADULT PATIENT RETURN ELECTRODE: Brand: VALLEYLAB

## (undated) DEVICE — 2108 SERIES SAGITTAL BLADE, COLLIGAN SPECIAL  (12.5 X 0.89 X 90.5MM)

## (undated) DEVICE — CONVERTORS STOCKINETTE: Brand: CONVERTORS

## (undated) DEVICE — PREP SKN CHLRAPRP 26ML TNT -- CONVERT TO ITEM 373320

## (undated) DEVICE — SUTURE ABSRB L30CM 2-0 VLT SPRL PDS + STRATAFIX SXPP1B410

## (undated) DEVICE — SYR 10ML LUER LOK 1/5ML GRAD --

## (undated) DEVICE — STAPLER SKIN H3.9MM WIRE DIA0.58MM CRWN 6.9MM 35 STPL FIX

## (undated) DEVICE — SUTURE VCRL SZ 1 L36IN ABSRB VLT L36MM CT-1 1/2 CIR J347H

## (undated) DEVICE — 3M™ IOBAN™ 2 ANTIMICROBIAL INCISE DRAPE 6651EZ: Brand: IOBAN™ 2

## (undated) DEVICE — SUTURE MCRYL SZ 3-0 L27IN ABSRB UD L19MM PS-2 3/8 CIR PRIM Y427H

## (undated) DEVICE — IMMOBILIZER KNEE PREMIER PRO TRI PNL 24INCH FOAM TIETEX PAT

## (undated) DEVICE — PAD,ABDOMINAL,5"X9",STERILE,LF,1/PK: Brand: MEDLINE INDUSTRIES, INC.

## (undated) DEVICE — KERLIX BANDAGE ROLL: Brand: KERLIX

## (undated) DEVICE — LIGHT HANDLE: Brand: DEVON

## (undated) DEVICE — STERILE POLYISOPRENE POWDER-FREE SURGICAL GLOVES: Brand: PROTEXIS

## (undated) DEVICE — KENDALL SCD EXPRESS SLEEVES, KNEE LENGTH, MEDIUM: Brand: KENDALL SCD

## (undated) DEVICE — SOL IRRIGATION INJ NACL 0.9% 500ML BTL

## (undated) DEVICE — SPONGE GZ W4XL4IN COT 12 PLY TYP VII WVN C FLD DSGN

## (undated) DEVICE — SUTURE ABSORBABLE BRAIDED 2-0 CT-1 27 IN UD VICRYL J259H

## (undated) DEVICE — BANDAGE COMPR W6INXL3YD EXSANGUATION 1 PLY ESMARCH

## (undated) DEVICE — BANDAGE,GAUZE,BULKEE II,4.5"X4.1YD,STRL: Brand: MEDLINE

## (undated) DEVICE — TRNQT CUFF RMFG 1PRT 34X4 PUR -- LAWSON OEM ITEM 279119 PK/5

## (undated) DEVICE — SPLINT ORTH AD L24IN FOR 29IN THGH UNIV KNEE FOAM

## (undated) DEVICE — STERILE LATEX POWDER-FREE SURGICAL GLOVESWITH NITRILE COATING: Brand: PROTEXIS

## (undated) DEVICE — PAD PREP ALCOHOL LG STERILE -- CONVERT TO ITEM 305014

## (undated) DEVICE — NDL PRT INJ NSAF BLNT 18GX1.5 --

## (undated) DEVICE — MAYO STAND COVER: Brand: CONVERTORS

## (undated) DEVICE — SUTURE VCRL SZ 2-0 L18IN ABSRB UD CT-1 L36MM 1/2 CIR J839D

## (undated) DEVICE — (D)PREP SKN CHLRAPRP APPL 26ML -- CONVERT TO ITEM 371833

## (undated) DEVICE — DRAPE,U/ SHT,SPLIT,PLAS,STERIL: Brand: MEDLINE

## (undated) DEVICE — SYR 5ML 1/5 GRAD LL NSAF LF --

## (undated) DEVICE — BLADE SAW W0.98XL3.54IN THK0.05IN CUT THK0.05IN SAG